# Patient Record
Sex: MALE | Race: WHITE | NOT HISPANIC OR LATINO | Employment: OTHER | URBAN - METROPOLITAN AREA
[De-identification: names, ages, dates, MRNs, and addresses within clinical notes are randomized per-mention and may not be internally consistent; named-entity substitution may affect disease eponyms.]

---

## 2017-01-02 PROBLEM — B96.20 E COLI BACTEREMIA: Status: ACTIVE | Noted: 2017-01-02

## 2017-01-02 PROBLEM — A41.52 PSEUDOMONAS SEPSIS: Status: ACTIVE | Noted: 2017-01-02

## 2017-01-02 PROBLEM — R78.81 BACTEREMIA DUE TO GRAM-NEGATIVE BACTERIA: Status: ACTIVE | Noted: 2017-01-02

## 2017-01-03 PROBLEM — R94.6 ABNORMAL THYROID FUNCTION TEST: Status: ACTIVE | Noted: 2017-01-03

## 2017-01-09 ENCOUNTER — ANESTHESIA EVENT (OUTPATIENT)
Dept: SURGERY | Facility: HOSPITAL | Age: 67
DRG: 003 | End: 2017-01-09
Payer: MEDICARE

## 2017-01-10 ENCOUNTER — ANESTHESIA (OUTPATIENT)
Dept: SURGERY | Facility: HOSPITAL | Age: 67
DRG: 003 | End: 2017-01-10
Payer: MEDICARE

## 2017-01-10 PROCEDURE — 63600175 PHARM REV CODE 636 W HCPCS: Performed by: NURSE ANESTHETIST, CERTIFIED REGISTERED

## 2017-01-10 PROCEDURE — D9220A PRA ANESTHESIA: Mod: CRNA,,, | Performed by: NURSE ANESTHETIST, CERTIFIED REGISTERED

## 2017-01-10 PROCEDURE — 25000003 PHARM REV CODE 250: Performed by: NURSE ANESTHETIST, CERTIFIED REGISTERED

## 2017-01-10 PROCEDURE — D9220A PRA ANESTHESIA: Mod: ANES,,, | Performed by: ANESTHESIOLOGY

## 2017-01-10 RX ORDER — FENTANYL CITRATE 50 UG/ML
INJECTION, SOLUTION INTRAMUSCULAR; INTRAVENOUS
Status: DISCONTINUED | OUTPATIENT
Start: 2017-01-10 | End: 2017-01-10

## 2017-01-10 RX ORDER — PHENYLEPHRINE HYDROCHLORIDE 10 MG/ML
INJECTION INTRAVENOUS
Status: DISCONTINUED | OUTPATIENT
Start: 2017-01-10 | End: 2017-01-10

## 2017-01-10 RX ORDER — MIDAZOLAM HYDROCHLORIDE 1 MG/ML
INJECTION, SOLUTION INTRAMUSCULAR; INTRAVENOUS
Status: DISCONTINUED | OUTPATIENT
Start: 2017-01-10 | End: 2017-01-10

## 2017-01-10 RX ADMIN — PHENYLEPHRINE HYDROCHLORIDE 200 MCG: 10 INJECTION INTRAVENOUS at 12:01

## 2017-01-10 RX ADMIN — MIDAZOLAM HYDROCHLORIDE 2 MG: 1 INJECTION, SOLUTION INTRAMUSCULAR; INTRAVENOUS at 11:01

## 2017-01-10 RX ADMIN — SODIUM CHLORIDE, SODIUM ACETATE ANHYDROUS, SODIUM GLUCONATE, POTASSIUM CHLORIDE, AND MAGNESIUM CHLORIDE: 526; 222; 502; 37; 30 INJECTION, SOLUTION INTRAVENOUS at 11:01

## 2017-01-10 RX ADMIN — FENTANYL CITRATE 50 MCG: 50 INJECTION, SOLUTION INTRAMUSCULAR; INTRAVENOUS at 11:01

## 2017-01-10 RX ADMIN — FENTANYL CITRATE 50 MCG: 50 INJECTION, SOLUTION INTRAMUSCULAR; INTRAVENOUS at 12:01

## 2017-01-10 NOTE — ANESTHESIA POSTPROCEDURE EVALUATION
"Anesthesia Post Evaluation    Patient: Enrique Yancey    Procedure(s) Performed: Procedure(s) (LRB):  TRACHEOTOMY- open (N/A)    Final Anesthesia Type: general  Patient location during evaluation: ICU  Patient participation: No - Unable to Participate, Coma/Other Inability to Communicate  Level of consciousness: obtunded/minimal responses  Post-procedure vital signs: reviewed and stable  Pain management: adequate  Airway patency: patent  PONV status at discharge: No PONV  Anesthetic complications: no      Cardiovascular status: blood pressure returned to baseline and hemodynamically stable  Hydration status: euvolemic  Follow-up not needed.        Visit Vitals    BP (!) 173/95 (BP Location: Left arm, Patient Position: Lying, BP Method: Automatic)    Pulse 79    Temp 37.3 °C (99.2 °F) (Axillary)    Resp (!) 23    Ht 5' 6" (1.676 m)    Wt 73.7 kg (162 lb 7.7 oz)    SpO2 100%    BMI 26.22 kg/m2       Pain/Dorian Score: Pain Assessment Performed: Yes (1/10/2017  3:00 PM)  Presence of Pain: non-verbal indicators absent (1/10/2017  3:00 PM)      "

## 2017-01-10 NOTE — ANESTHESIA PREPROCEDURE EVALUATION
01/09/2017  Enrique Yancey is a 66 y.o., male.with PMH of HTN, CKD III, CAD s/p MI x2, and multiple CVAs who presents with right sided weakness and aphasia (11/24/16) given TPA with improvement of speech and weakness ultimately with peg tube placement with subsequent GI bleed multiple failed extubation attempts and is currently intubated, off sedation, not following commands who presents for:    Pre-operative evaluation for Procedure(s) (LRB):  TRACHEOTOMY- open (N/A)    Previous anes:  EGD(12/29/16):  Present Prior to Hospital Arrival?: No; Placement Date: 12/29/16; Placement Time: 0920; Method of Intubation: Direct laryngoscopy; Inserted by: MD; Staff/Resident Names: Dr Chase; Airway Device: Endotracheal Tube; Airway Device Size: 8.0; Style: Cuffed; Cuff Inflation: Minimal occlusive pressure; Placement Verified By: Auscultation, Colorimetric EtCO2 device;  Depth of Insertion: 24; Securment: Lips; Complications: None; Breath Sounds: Equal Bilateral; Insertion Attempts: 1; Tolerance: Well    Patient Active Problem List   Diagnosis    Essential hypertension    Hx drug induced Dermatomyositis    MCI (mild cognitive impairment) with memory loss    History of TIA (transient ischemic attack)    Impaired glucose tolerance    CKD (chronic kidney disease) stage 3, GFR 30-59 ml/min    Cerebrovascular disease    Cytotoxic cerebral edema    Stenosis of left internal carotid artery with cerebral infarction    Hypovolemia due to dehydration    Aphasia    Right spastic hemiparesis    Hyponatremia with decreased serum osmolality    Seizure disorder, focal motor    Symptomatic carotid artery stenosis    Dysphagia    ATN (acute tubular necrosis)    Hypernatremia    Cerebrovascular accident (CVA)    Pharyngeal dysphagia    Pharyngoesophageal dysphagia    Diarrhea    Pancytopenia    GI bleed    Acute  respiratory failure with hypoxia and hypercapnia    Cerebrovascular accident (CVA) due to thrombosis of left carotid artery    Gastrointestinal hemorrhage associated with gastric ulcer    E coli bacteremia    Pseudomonas sepsis    Abnormal thyroid function test       Review of patient's allergies indicates:   Allergen Reactions    Diltiazem hcl     Metoprolol tartrate     Sulfa (sulfonamide antibiotics)        No current facility-administered medications on file prior to encounter.      Current Outpatient Prescriptions on File Prior to Encounter   Medication Sig Dispense Refill    amlodipine (NORVASC) 10 MG tablet TAKE 1 TABLET BY MOUTH EVERY DAY 90 tablet 3    aspirin 81 MG Chew Take 81 mg by mouth once daily.        b complex vitamins tablet Take 1 tablet by mouth once daily.      CYANOCOBALAMIN, VITAMIN B-12, (VITAMIN B-12 ORAL) Take 2,500 mcg by mouth once daily.      donepezil (ARICEPT) 5 MG tablet Take 1 tablet (5 mg total) by mouth every morning. 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      Retired     Social History Main Topics    Smoking status: Former Smoker     Types: Cigars    Smokeless tobacco: Never Used      Comment: occasional cigar    Alcohol use No    Drug use: No    Sexual activity: Not on file     Other Topics Concern    Not on file     Social History Narrative         Vital Signs Range (Last 24H):  Temp:  [36.7 °C (98 °F)-37.4 °C (99.4 °F)]   Pulse:  [77-89]   Resp:  [12-25]   BP: (153-172)/(84-99)   SpO2:  [98 %-100 %]       CBC:   Recent Labs      01/08/17   0300  01/09/17   0300   WBC  24.18*  23.80*   RBC  2.95*   3.10*   HGB  8.7*  9.1*   HCT  26.3*  27.8*   PLT  324  353*   MCV  89  90   MCH  29.5  29.4   MCHC  33.1  32.7       CMP:   Recent Labs      01/08/17   0300  01/08/17   0921   01/09/17   0300  01/09/17   1651   NA  143   --    < >  142  141   K  3.8   --    < >  3.9  3.4*   CL  112*   --    < >  111*  110   CO2  24   --    < >  22*  23   BUN  30*   --    < >  27*  27*   CREATININE  1.6*   --    < >  1.5*  1.4   GLU  94   --    < >  91  93   MG  1.3*  1.8   --   2.2   --    PHOS  3.0   --    --   3.4   --    CALCIUM  7.9*   --    < >  8.0*  7.9*   ALBUMIN  1.8*   --    < >  1.9*  1.8*   PROT  6.3   --    < >  6.9  6.8   ALKPHOS  188*   --    < >  175*  170*   ALT  29   --    < >  28  25   AST  43*   --    < >  41*  42*   BILITOT  1.1*   --    < >  1.0  0.9    < > = values in this interval not displayed.       INR  Recent Labs      01/09/17   1655   INR  1.5*   APTT  29.1           Diagnostic Studies:  Ab xr (1/9/17):  Abdomen single view compared to January 4.  G-tube noted.  Paucity of bowel gas in the abdomen.  No significant air in the descending colon or rectum.    Impression there is a paucity of bowel gas in the abdomen despite the history of abdominal distention.      Electronically signed by: JENNIFER DOVE MD  Date: 01/09/17  Time: 11:42     EKG:  Vent. Rate : 093 BPM     Atrial Rate : 093 BPM     P-R Int : 232 ms          QRS Dur : 102 ms      QT Int : 392 ms       P-R-T Axes : 047 -10 013 degrees     QTc Int : 487 ms    Sinus rhythm with 1st degree A-V block  Otherwise normal ECG    When compared with ECG of 24-NOV-2016 17:10,  No significant change was found  Confirmed by AUBREY BUSTILLO MD ([id number]) on 12/13/2016 10:47:31 AM    2D Echo:  Technical Quality: This is a portable study performed at the patient's bedside. This is a technically challenging study.     Aorta: The aortic root is normal in size, measuring 3.5 cm at sinotubular junction and 3.3 cm at Sinuses of Valsalva. The proximal ascending aorta is  normal in size, measuring 3.8 cm across.     Left Atrium: The left atrial volume index is normal, measuring 33.80 cc/m2.     Left Ventricle: The left ventricle is normal in size, with an end-diastolic diameter of 4.9 cm, and an end-systolic diameter of 3.4 cm. Septal wall thickness is upper limit of normal in size, with the septum measuring 1.1 cm and the posterior wall   measuring 1.0 cm across. Relative wall thickness was normal at 0.41, and the LV mass index was increased at 116.8 g/m2 consistent with eccentric left ventricular hypertrophy. Global left ventricular systolic function appears normal. Visually estimated   ejection fraction is 60-65%. The LV Doppler derived stroke volume equals 58.0 ccs.   The E/e'(lat) is 11, consistent with normal diastolic function.     Right Atrium: The right atrium is normal in size, measuring 5.0 cm in length and 4.0 cm in width in the apical view.     Right Ventricle: The right ventricle is normal in size measuring 3.0 cm at the base in the apical right ventricle-focused view. Global right ventricular systolic function appears normal. Tricuspid annular plane systolic excursion (TAPSE) is 2.2 cm. The   estimated PA systolic pressure is greater than 24 mmHg.     Aortic Valve:  The aortic valve is mildly sclerotic. Additionally, there is trivial to mild aortic regurgitation.     Mitral Valve:  There is mitral annular calcification. Mitral valve is normal in structure with normal leaflet mobility.     Tricuspid Valve:  There is mild tricuspid regurgitation. Tricuspid valve is normal in structure with normal leaflet mobility.     Pulmonary Valve:  Pulmonary valve is normal in structure with normal leaflet mobility.     IVC: Patient is on ventilator. IVC dynamics are not reliable in assessing right atrial pressure.     Intracavitary: There is no evidence of pericardial effusion, intracavity mass, thrombi, or vegetation.         CONCLUSIONS     1 - Eccentric hypertrophy.     2 -  Normal left ventricular systolic function (EF 60-65%).     3 - Normal left ventricular diastolic function.     4 - Normal right ventricular systolic function .     5 - The estimated PA systolic pressure is greater than 24 mmHg.     6 - Trivial to mild aortic regurgitation.     7 - Mild tricuspid regurgitation.             This document has been electronically    SIGNED BY: Ashley Mendoza MD On: 01/03/2017 15:21      [facility] Anesthesia Evaluation    I have reviewed the Patient Summary Reports.     I have reviewed the Medications.     Review of Systems  Anesthesia Hx:  No problems with previous Anesthesia  Denies Family Hx of Anesthesia complications.   Denies Personal Hx of Anesthesia complications.   Social:  Former Smoker    Cardiovascular:   Hypertension, well controlled Past MI  Denies CABG/stent.     Pulmonary:  Pulmonary Normal Currently vent-dependent    Renal/:   Chronic Renal Disease, CRI    Hepatic/GI:   PUD,    Neurological:   CVA, residual symptoms    Endocrine:  Endocrine Normal        Physical Exam  General:  Well nourished    Airway/Jaw/Neck:  Airway Findings: Pre-Existing Airway Tube(s): Oral Endotracheal tube Size: 8.0       Chest/Lungs:  Chest/Lungs Findings: (coarse ventilatory breath sounds throughout)    Heart/Vascular:  Heart Findings: Rate: Normal  Rhythm: Regular Rhythm        Mental Status:  Mental Status Findings:  Somnolent         Anesthesia Plan  Type of Anesthesia, risks & benefits discussed:  Anesthesia Type:  general  Patient's Preference:   Intra-op Monitoring Plan:   Intra-op Monitoring Plan Comments:   Post Op Pain Control Plan:   Post Op Pain Control Plan Comments:   Induction:   IV  Beta Blocker:  Patient is on a Beta-Blocker and has received one dose within the past 24 hours (No further documentation required).       Informed Consent: Patient representative understands risks and agrees with Anesthesia plan.  Questions answered. Anesthesia consent signed with patient  representative.  ASA Score: 4     Day of Surgery Review of History & Physical:    H&P update referred to the surgeon.         Ready For Surgery From Anesthesia Perspective.

## 2017-01-10 NOTE — TRANSFER OF CARE
"Anesthesia Transfer of Care Note    Patient: Enrique Yancey    Procedure(s) Performed: Procedure(s) (LRB):  TRACHEOTOMY- open (N/A)    Patient location: ICU    Anesthesia Type: general    Transport from OR: Transported from OR intubated on 100% O2 by AMBU with assisted ventilation. Continuous ECG monitoring in transport. Continuous SpO2 monitoring in transport    Post pain: adequate analgesia    Post assessment: no apparent anesthetic complications    Post vital signs: stable    Level of consciousness: sedated    Nausea/Vomiting: no nausea/vomiting    Comments: 550/4/.40/+5/10 IMV  133/77  HR 79  Sat 99% and ax temp 98 on arrival to ICU.      Last vitals:   Visit Vitals    BP (!) 157/84 (BP Location: Left arm, Patient Position: Lying, BP Method: Automatic)    Pulse 79    Temp 37.7 °C (99.8 °F) (Axillary)    Resp 20    Ht 5' 6" (1.676 m)    Wt 73.7 kg (162 lb 7.7 oz)    SpO2 99%    BMI 26.22 kg/m2     "

## 2017-01-14 ENCOUNTER — TELEPHONE (OUTPATIENT)
Dept: GASTROENTEROLOGY | Facility: CLINIC | Age: 67
End: 2017-01-14

## 2017-01-15 NOTE — TELEPHONE ENCOUNTER
PT w gastric ulcers.  Needs repeat EGD in 8-12 weeks.  Currently admitted to hospital w CVA.  PLs schedule follow up apt w mid level provider in 6 weeks so that pt can be scheduled for procedure if clinically appropriate at that time.

## 2017-01-20 ENCOUNTER — TELEPHONE (OUTPATIENT)
Dept: GASTROENTEROLOGY | Facility: CLINIC | Age: 67
End: 2017-01-20

## 2017-01-20 PROBLEM — R78.81 E COLI BACTEREMIA: Status: RESOLVED | Noted: 2017-01-02 | Resolved: 2017-01-20

## 2017-01-20 PROBLEM — B96.20 E COLI BACTEREMIA: Status: RESOLVED | Noted: 2017-01-02 | Resolved: 2017-01-20

## 2017-01-20 PROBLEM — A41.52 PSEUDOMONAS SEPSIS: Status: RESOLVED | Noted: 2017-01-02 | Resolved: 2017-01-20

## 2017-05-13 ENCOUNTER — LAB VISIT (OUTPATIENT)
Dept: LAB | Facility: HOSPITAL | Age: 67
End: 2017-05-13
Attending: FAMILY MEDICINE
Payer: MEDICARE

## 2017-05-13 DIAGNOSIS — J98.8 CONGESTION OF RESPIRATORY TRACT: ICD-10-CM

## 2017-05-13 DIAGNOSIS — R50.9 ELEVATED TEMPERATURE: Primary | ICD-10-CM

## 2017-05-13 LAB
BASOPHILS # BLD AUTO: 0 K/UL
BASOPHILS NFR BLD: 0.2 %
DIFFERENTIAL METHOD: ABNORMAL
EOSINOPHIL # BLD AUTO: 0.3 K/UL
EOSINOPHIL NFR BLD: 2.6 %
ERYTHROCYTE [DISTWIDTH] IN BLOOD BY AUTOMATED COUNT: 19.6 %
HCT VFR BLD AUTO: 25.9 %
HGB BLD-MCNC: 8.3 G/DL
LYMPHOCYTES # BLD AUTO: 0.6 K/UL
LYMPHOCYTES NFR BLD: 6 %
MCH RBC QN AUTO: 26 PG
MCHC RBC AUTO-ENTMCNC: 32 %
MCV RBC AUTO: 81 FL
MONOCYTES # BLD AUTO: 0.9 K/UL
MONOCYTES NFR BLD: 8.8 %
NEUTROPHILS # BLD AUTO: 8.2 K/UL
NEUTROPHILS NFR BLD: 82.4 %
PLATELET # BLD AUTO: 302 K/UL
PMV BLD AUTO: 9.3 FL
RBC # BLD AUTO: 3.19 M/UL
WBC # BLD AUTO: 10 K/UL

## 2017-05-13 PROCEDURE — 85025 COMPLETE CBC W/AUTO DIFF WBC: CPT

## 2017-05-13 PROCEDURE — 36415 COLL VENOUS BLD VENIPUNCTURE: CPT

## 2017-12-16 ENCOUNTER — HOSPITAL ENCOUNTER (EMERGENCY)
Facility: HOSPITAL | Age: 67
Discharge: ANOTHER HOSPITAL | End: 2017-12-17
Attending: EMERGENCY MEDICINE
Payer: MEDICARE

## 2017-12-16 DIAGNOSIS — G40.901 STATUS EPILEPTICUS: Primary | ICD-10-CM

## 2017-12-16 DIAGNOSIS — J18.9 HCAP (HEALTHCARE-ASSOCIATED PNEUMONIA): ICD-10-CM

## 2017-12-16 LAB
ALBUMIN SERPL BCP-MCNC: 3.1 G/DL
ALP SERPL-CCNC: 198 U/L
ALT SERPL W/O P-5'-P-CCNC: 164 U/L
AMORPH CRY URNS QL MICRO: ABNORMAL
ANION GAP SERPL CALC-SCNC: 7 MMOL/L
AST SERPL-CCNC: 59 U/L
BACTERIA #/AREA URNS HPF: ABNORMAL /HPF
BASOPHILS # BLD AUTO: 0 K/UL
BASOPHILS NFR BLD: 0.1 %
BILIRUB SERPL-MCNC: 0.7 MG/DL
BILIRUB UR QL STRIP: NEGATIVE
BUN SERPL-MCNC: 43 MG/DL
CALCIUM SERPL-MCNC: 9.6 MG/DL
CHLORIDE SERPL-SCNC: 107 MMOL/L
CLARITY UR: CLEAR
CO2 SERPL-SCNC: 21 MMOL/L
COLOR UR: YELLOW
CREAT SERPL-MCNC: 1.2 MG/DL
DIFFERENTIAL METHOD: ABNORMAL
EOSINOPHIL # BLD AUTO: 0 K/UL
EOSINOPHIL NFR BLD: 0 %
ERYTHROCYTE [DISTWIDTH] IN BLOOD BY AUTOMATED COUNT: 16.7 %
EST. GFR  (AFRICAN AMERICAN): >60 ML/MIN/1.73 M^2
EST. GFR  (NON AFRICAN AMERICAN): >60 ML/MIN/1.73 M^2
GLUCOSE SERPL-MCNC: 152 MG/DL
GLUCOSE UR QL STRIP: NEGATIVE
HCT VFR BLD AUTO: 35.6 %
HGB BLD-MCNC: 11.8 G/DL
HGB UR QL STRIP: ABNORMAL
HYALINE CASTS #/AREA URNS LPF: 0 /LPF
KETONES UR QL STRIP: NEGATIVE
LEUKOCYTE ESTERASE UR QL STRIP: ABNORMAL
LYMPHOCYTES # BLD AUTO: 0.4 K/UL
LYMPHOCYTES NFR BLD: 2.2 %
MCH RBC QN AUTO: 31.1 PG
MCHC RBC AUTO-ENTMCNC: 33.3 G/DL
MCV RBC AUTO: 94 FL
MICROSCOPIC COMMENT: ABNORMAL
MONOCYTES # BLD AUTO: 0.5 K/UL
MONOCYTES NFR BLD: 2.6 %
NEUTROPHILS # BLD AUTO: 18.3 K/UL
NEUTROPHILS NFR BLD: 95.1 %
NITRITE UR QL STRIP: NEGATIVE
PH UR STRIP: 6 [PH] (ref 5–8)
PLATELET # BLD AUTO: 171 K/UL
PMV BLD AUTO: 9.9 FL
POTASSIUM SERPL-SCNC: 4.7 MMOL/L
PROT SERPL-MCNC: 8.9 G/DL
PROT UR QL STRIP: ABNORMAL
RBC # BLD AUTO: 3.8 M/UL
RBC #/AREA URNS HPF: 5 /HPF (ref 0–4)
SODIUM SERPL-SCNC: 135 MMOL/L
SP GR UR STRIP: 1.02 (ref 1–1.03)
SQUAMOUS #/AREA URNS HPF: 5 /HPF
URN SPEC COLLECT METH UR: ABNORMAL
UROBILINOGEN UR STRIP-ACNC: NEGATIVE EU/DL
WBC # BLD AUTO: 19.2 K/UL
WBC #/AREA URNS HPF: 20 /HPF (ref 0–5)
WBC CLUMPS URNS QL MICRO: ABNORMAL

## 2017-12-16 PROCEDURE — 87086 URINE CULTURE/COLONY COUNT: CPT

## 2017-12-16 PROCEDURE — 87088 URINE BACTERIA CULTURE: CPT

## 2017-12-16 PROCEDURE — 96366 THER/PROPH/DIAG IV INF ADDON: CPT

## 2017-12-16 PROCEDURE — 99291 CRITICAL CARE FIRST HOUR: CPT | Mod: 25

## 2017-12-16 PROCEDURE — 36415 COLL VENOUS BLD VENIPUNCTURE: CPT

## 2017-12-16 PROCEDURE — 80053 COMPREHEN METABOLIC PANEL: CPT

## 2017-12-16 PROCEDURE — 87186 SC STD MICRODIL/AGAR DIL: CPT

## 2017-12-16 PROCEDURE — 63600175 PHARM REV CODE 636 W HCPCS: Performed by: EMERGENCY MEDICINE

## 2017-12-16 PROCEDURE — 25000003 PHARM REV CODE 250: Performed by: EMERGENCY MEDICINE

## 2017-12-16 PROCEDURE — 87040 BLOOD CULTURE FOR BACTERIA: CPT

## 2017-12-16 PROCEDURE — 81000 URINALYSIS NONAUTO W/SCOPE: CPT

## 2017-12-16 PROCEDURE — 96375 TX/PRO/DX INJ NEW DRUG ADDON: CPT

## 2017-12-16 PROCEDURE — 85025 COMPLETE CBC W/AUTO DIFF WBC: CPT

## 2017-12-16 PROCEDURE — 96367 TX/PROPH/DG ADDL SEQ IV INF: CPT

## 2017-12-16 PROCEDURE — 96365 THER/PROPH/DIAG IV INF INIT: CPT

## 2017-12-16 PROCEDURE — 87077 CULTURE AEROBIC IDENTIFY: CPT

## 2017-12-16 PROCEDURE — 93005 ELECTROCARDIOGRAM TRACING: CPT

## 2017-12-16 RX ORDER — CIPROFLOXACIN 2 MG/ML
400 INJECTION, SOLUTION INTRAVENOUS
Status: COMPLETED | OUTPATIENT
Start: 2017-12-16 | End: 2017-12-17

## 2017-12-16 RX ORDER — LORAZEPAM 2 MG/ML
INJECTION INTRAMUSCULAR
Status: DISCONTINUED
Start: 2017-12-16 | End: 2017-12-17 | Stop reason: HOSPADM

## 2017-12-16 RX ORDER — LORAZEPAM 2 MG/ML
2 INJECTION INTRAMUSCULAR
Status: COMPLETED | OUTPATIENT
Start: 2017-12-16 | End: 2017-12-16

## 2017-12-16 RX ORDER — LEVETIRACETAM 10 MG/ML
1000 INJECTION INTRAVASCULAR
Status: COMPLETED | OUTPATIENT
Start: 2017-12-16 | End: 2017-12-16

## 2017-12-16 RX ORDER — LORAZEPAM 2 MG/ML
2 INJECTION INTRAMUSCULAR
Status: DISCONTINUED | OUTPATIENT
Start: 2017-12-16 | End: 2017-12-16

## 2017-12-16 RX ORDER — ACETAMINOPHEN 650 MG/1
650 SUPPOSITORY RECTAL
Status: COMPLETED | OUTPATIENT
Start: 2017-12-16 | End: 2017-12-16

## 2017-12-16 RX ADMIN — LORAZEPAM 2 MG: 2 INJECTION, SOLUTION INTRAMUSCULAR; INTRAVENOUS at 09:12

## 2017-12-16 RX ADMIN — LEVETIRACETAM 1000 MG: 1000 INJECTION, SOLUTION INTRAVENOUS at 10:12

## 2017-12-16 RX ADMIN — ACETAMINOPHEN 650 MG: 650 SUPPOSITORY RECTAL at 11:12

## 2017-12-16 RX ADMIN — LORAZEPAM 2 MG: 2 INJECTION, SOLUTION INTRAMUSCULAR; INTRAVENOUS at 10:12

## 2017-12-16 RX ADMIN — SODIUM CHLORIDE 1000 ML: 0.9 INJECTION, SOLUTION INTRAVENOUS at 11:12

## 2017-12-16 RX ADMIN — CIPROFLOXACIN 400 MG: 2 INJECTION, SOLUTION INTRAVENOUS at 11:12

## 2017-12-17 ENCOUNTER — HOSPITAL ENCOUNTER (INPATIENT)
Facility: HOSPITAL | Age: 67
LOS: 38 days | Discharge: NURSING, REHAB OR LONG-TERM CARE | DRG: 100 | End: 2018-01-24
Attending: PSYCHIATRY & NEUROLOGY | Admitting: PSYCHIATRY & NEUROLOGY
Payer: MEDICARE

## 2017-12-17 VITALS
DIASTOLIC BLOOD PRESSURE: 61 MMHG | HEART RATE: 80 BPM | WEIGHT: 120 LBS | SYSTOLIC BLOOD PRESSURE: 118 MMHG | HEIGHT: 65 IN | OXYGEN SATURATION: 100 % | RESPIRATION RATE: 20 BRPM | BODY MASS INDEX: 19.99 KG/M2 | TEMPERATURE: 101 F

## 2017-12-17 DIAGNOSIS — G40.901 STATUS EPILEPTICUS: ICD-10-CM

## 2017-12-17 DIAGNOSIS — G92.9 TOXIC ENCEPHALOPATHY: ICD-10-CM

## 2017-12-17 DIAGNOSIS — R56.9 SEIZURE: ICD-10-CM

## 2017-12-17 DIAGNOSIS — R25.2 SPASTICITY: ICD-10-CM

## 2017-12-17 DIAGNOSIS — R13.14 PHARYNGOESOPHAGEAL DYSPHAGIA: ICD-10-CM

## 2017-12-17 DIAGNOSIS — E03.9 ACQUIRED HYPOTHYROIDISM: ICD-10-CM

## 2017-12-17 DIAGNOSIS — N30.00 ACUTE CYSTITIS WITHOUT HEMATURIA: ICD-10-CM

## 2017-12-17 DIAGNOSIS — I10 HYPERTENSION, UNSPECIFIED TYPE: ICD-10-CM

## 2017-12-17 DIAGNOSIS — E03.9 HYPOTHYROIDISM, UNSPECIFIED TYPE: ICD-10-CM

## 2017-12-17 DIAGNOSIS — I10 ESSENTIAL HYPERTENSION: ICD-10-CM

## 2017-12-17 DIAGNOSIS — G81.11 RIGHT SPASTIC HEMIPARESIS: ICD-10-CM

## 2017-12-17 DIAGNOSIS — J18.9 PNEUMONIA OF BOTH LUNGS DUE TO INFECTIOUS ORGANISM, UNSPECIFIED PART OF LUNG: ICD-10-CM

## 2017-12-17 DIAGNOSIS — D64.9 NORMOCYTIC ANEMIA: ICD-10-CM

## 2017-12-17 DIAGNOSIS — I10 HTN (HYPERTENSION): ICD-10-CM

## 2017-12-17 DIAGNOSIS — R33.9 URINARY RETENTION: ICD-10-CM

## 2017-12-17 DIAGNOSIS — R47.01 APHASIA: ICD-10-CM

## 2017-12-17 DIAGNOSIS — R13.12 OROPHARYNGEAL DYSPHAGIA: ICD-10-CM

## 2017-12-17 DIAGNOSIS — N18.30 CKD (CHRONIC KIDNEY DISEASE) STAGE 3, GFR 30-59 ML/MIN: ICD-10-CM

## 2017-12-17 PROBLEM — R41.82 ALTERED MENTAL STATUS: Status: ACTIVE | Noted: 2017-12-17

## 2017-12-17 LAB
ALBUMIN SERPL BCP-MCNC: 2.7 G/DL
ALP SERPL-CCNC: 172 U/L
ALT SERPL W/O P-5'-P-CCNC: 133 U/L
ANION GAP SERPL CALC-SCNC: 9 MMOL/L
ANISOCYTOSIS BLD QL SMEAR: SLIGHT
APTT BLDCRRT: 21.7 SEC
AST SERPL-CCNC: 53 U/L
BACTERIA #/AREA URNS AUTO: NORMAL /HPF
BASOPHILS # BLD AUTO: 0 K/UL
BASOPHILS NFR BLD: 0 %
BILIRUB SERPL-MCNC: 0.7 MG/DL
BILIRUB UR QL STRIP: NEGATIVE
BUN SERPL-MCNC: 37 MG/DL
CALCIUM SERPL-MCNC: 8.6 MG/DL
CHLORIDE SERPL-SCNC: 109 MMOL/L
CLARITY UR REFRACT.AUTO: CLEAR
CO2 SERPL-SCNC: 17 MMOL/L
COLOR UR AUTO: ABNORMAL
CREAT SERPL-MCNC: 1 MG/DL
DIFFERENTIAL METHOD: ABNORMAL
EOSINOPHIL # BLD AUTO: 0 K/UL
EOSINOPHIL NFR BLD: 0 %
ERYTHROCYTE [DISTWIDTH] IN BLOOD BY AUTOMATED COUNT: 16.2 %
EST. GFR  (AFRICAN AMERICAN): >60 ML/MIN/1.73 M^2
EST. GFR  (NON AFRICAN AMERICAN): >60 ML/MIN/1.73 M^2
GLUCOSE SERPL-MCNC: 110 MG/DL
GLUCOSE UR QL STRIP: NEGATIVE
HCT VFR BLD AUTO: 34.1 %
HGB BLD-MCNC: 11.1 G/DL
HGB UR QL STRIP: ABNORMAL
HYPOCHROMIA BLD QL SMEAR: ABNORMAL
IMM GRANULOCYTES # BLD AUTO: 0.03 K/UL
IMM GRANULOCYTES NFR BLD AUTO: 0.3 %
INR PPP: 1
INR PPP: 1.1
KETONES UR QL STRIP: NEGATIVE
LACTATE SERPL-SCNC: 1.6 MMOL/L
LEUKOCYTE ESTERASE UR QL STRIP: ABNORMAL
LYMPHOCYTES # BLD AUTO: 0.2 K/UL
LYMPHOCYTES NFR BLD: 2.3 %
MAGNESIUM SERPL-MCNC: 1.7 MG/DL
MCH RBC QN AUTO: 31.4 PG
MCHC RBC AUTO-ENTMCNC: 32.6 G/DL
MCV RBC AUTO: 97 FL
MICROSCOPIC COMMENT: NORMAL
MONOCYTES # BLD AUTO: 0.2 K/UL
MONOCYTES NFR BLD: 2 %
NEUTROPHILS # BLD AUTO: 9.3 K/UL
NEUTROPHILS NFR BLD: 95.4 %
NITRITE UR QL STRIP: NEGATIVE
NRBC BLD-RTO: 0 /100 WBC
OVALOCYTES BLD QL SMEAR: ABNORMAL
PH UR STRIP: 6 [PH] (ref 5–8)
PHOSPHATE SERPL-MCNC: 4.1 MG/DL
PLATELET # BLD AUTO: 133 K/UL
PMV BLD AUTO: 11.9 FL
POCT GLUCOSE: 105 MG/DL (ref 70–110)
POCT GLUCOSE: 84 MG/DL (ref 70–110)
POCT GLUCOSE: 99 MG/DL (ref 70–110)
POIKILOCYTOSIS BLD QL SMEAR: SLIGHT
POLYCHROMASIA BLD QL SMEAR: ABNORMAL
POTASSIUM SERPL-SCNC: 4.4 MMOL/L
PROCALCITONIN SERPL IA-MCNC: 6.92 NG/ML
PROT SERPL-MCNC: 7.9 G/DL
PROT UR QL STRIP: NEGATIVE
PROTHROMBIN TIME: 10.9 SEC
PROTHROMBIN TIME: 11.6 SEC
RBC # BLD AUTO: 3.53 M/UL
RBC #/AREA URNS AUTO: 3 /HPF (ref 0–4)
SODIUM SERPL-SCNC: 135 MMOL/L
SP GR UR STRIP: 1.01 (ref 1–1.03)
URN SPEC COLLECT METH UR: ABNORMAL
UROBILINOGEN UR STRIP-ACNC: NEGATIVE EU/DL
WBC # BLD AUTO: 9.7 K/UL
WBC #/AREA URNS AUTO: 3 /HPF (ref 0–5)

## 2017-12-17 PROCEDURE — 80053 COMPREHEN METABOLIC PANEL: CPT

## 2017-12-17 PROCEDURE — 63600175 PHARM REV CODE 636 W HCPCS: Performed by: PHYSICIAN ASSISTANT

## 2017-12-17 PROCEDURE — 63600175 PHARM REV CODE 636 W HCPCS: Performed by: EMERGENCY MEDICINE

## 2017-12-17 PROCEDURE — 85730 THROMBOPLASTIN TIME PARTIAL: CPT

## 2017-12-17 PROCEDURE — 83605 ASSAY OF LACTIC ACID: CPT

## 2017-12-17 PROCEDURE — 25000003 PHARM REV CODE 250: Performed by: NURSE PRACTITIONER

## 2017-12-17 PROCEDURE — 96367 TX/PROPH/DG ADDL SEQ IV INF: CPT

## 2017-12-17 PROCEDURE — 97802 MEDICAL NUTRITION INDIV IN: CPT

## 2017-12-17 PROCEDURE — 83735 ASSAY OF MAGNESIUM: CPT

## 2017-12-17 PROCEDURE — 25000003 PHARM REV CODE 250: Performed by: PHYSICIAN ASSISTANT

## 2017-12-17 PROCEDURE — 86850 RBC ANTIBODY SCREEN: CPT

## 2017-12-17 PROCEDURE — 99223 1ST HOSP IP/OBS HIGH 75: CPT | Mod: ,,, | Performed by: NURSE PRACTITIONER

## 2017-12-17 PROCEDURE — 85025 COMPLETE CBC W/AUTO DIFF WBC: CPT

## 2017-12-17 PROCEDURE — 27000221 HC OXYGEN, UP TO 24 HOURS

## 2017-12-17 PROCEDURE — 63600175 PHARM REV CODE 636 W HCPCS: Performed by: PSYCHIATRY & NEUROLOGY

## 2017-12-17 PROCEDURE — 36600 WITHDRAWAL OF ARTERIAL BLOOD: CPT

## 2017-12-17 PROCEDURE — 85610 PROTHROMBIN TIME: CPT

## 2017-12-17 PROCEDURE — 85610 PROTHROMBIN TIME: CPT | Mod: 91

## 2017-12-17 PROCEDURE — 20000000 HC ICU ROOM

## 2017-12-17 PROCEDURE — 84145 PROCALCITONIN (PCT): CPT

## 2017-12-17 PROCEDURE — 95951 PR EEG MONITORING/VIDEORECORD: CPT | Mod: 26,,, | Performed by: PSYCHIATRY & NEUROLOGY

## 2017-12-17 PROCEDURE — 81001 URINALYSIS AUTO W/SCOPE: CPT

## 2017-12-17 PROCEDURE — 96375 TX/PRO/DX INJ NEW DRUG ADDON: CPT

## 2017-12-17 PROCEDURE — 27100171 HC OXYGEN HIGH FLOW UP TO 24 HOURS

## 2017-12-17 PROCEDURE — 36415 COLL VENOUS BLD VENIPUNCTURE: CPT

## 2017-12-17 PROCEDURE — 25000003 PHARM REV CODE 250: Performed by: PSYCHIATRY & NEUROLOGY

## 2017-12-17 PROCEDURE — 86900 BLOOD TYPING SEROLOGIC ABO: CPT

## 2017-12-17 PROCEDURE — 99900022

## 2017-12-17 PROCEDURE — 99900026 HC AIRWAY MAINTENANCE (STAT)

## 2017-12-17 PROCEDURE — 95951 HC EEG MONITORING/VIDEO RECORD: CPT

## 2017-12-17 PROCEDURE — 25000003 PHARM REV CODE 250: Performed by: EMERGENCY MEDICINE

## 2017-12-17 PROCEDURE — 86901 BLOOD TYPING SEROLOGIC RH(D): CPT

## 2017-12-17 PROCEDURE — 82803 BLOOD GASES ANY COMBINATION: CPT

## 2017-12-17 PROCEDURE — 94761 N-INVAS EAR/PLS OXIMETRY MLT: CPT

## 2017-12-17 PROCEDURE — 93010 ELECTROCARDIOGRAM REPORT: CPT | Mod: ,,, | Performed by: INTERNAL MEDICINE

## 2017-12-17 PROCEDURE — 84100 ASSAY OF PHOSPHORUS: CPT

## 2017-12-17 RX ORDER — AMOXICILLIN 250 MG
1 CAPSULE ORAL DAILY
Status: DISCONTINUED | OUTPATIENT
Start: 2017-12-18 | End: 2018-01-24 | Stop reason: HOSPADM

## 2017-12-17 RX ORDER — ASCORBIC ACID 500 MG
500 TABLET ORAL 2 TIMES DAILY
Status: DISCONTINUED | OUTPATIENT
Start: 2017-12-17 | End: 2017-12-18

## 2017-12-17 RX ORDER — LEVOTHYROXINE SODIUM 75 UG/1
75 TABLET ORAL DAILY
COMMUNITY

## 2017-12-17 RX ORDER — AMLODIPINE BESYLATE 10 MG/1
10 TABLET ORAL DAILY
Status: ON HOLD | COMMUNITY
End: 2017-12-27 | Stop reason: HOSPADM

## 2017-12-17 RX ORDER — FLUOXETINE HYDROCHLORIDE 20 MG/1
20 CAPSULE ORAL DAILY
COMMUNITY

## 2017-12-17 RX ORDER — ATORVASTATIN CALCIUM 40 MG/1
40 TABLET, FILM COATED ORAL NIGHTLY
Status: ON HOLD | COMMUNITY
End: 2017-12-31

## 2017-12-17 RX ORDER — ATORVASTATIN CALCIUM 20 MG/1
40 TABLET, FILM COATED ORAL DAILY
Status: DISCONTINUED | OUTPATIENT
Start: 2017-12-17 | End: 2018-01-24 | Stop reason: HOSPADM

## 2017-12-17 RX ORDER — POLYETHYLENE GLYCOL 3350 17 G/17G
17 POWDER, FOR SOLUTION ORAL DAILY
Status: DISCONTINUED | OUTPATIENT
Start: 2017-12-18 | End: 2018-01-24 | Stop reason: HOSPADM

## 2017-12-17 RX ORDER — POLYETHYLENE GLYCOL 3350 17 G/17G
17 POWDER, FOR SOLUTION ORAL DAILY
Status: ON HOLD | COMMUNITY
End: 2018-01-24 | Stop reason: HOSPADM

## 2017-12-17 RX ORDER — LEVETIRACETAM 5 MG/ML
500 INJECTION INTRAVASCULAR EVERY 12 HOURS
Status: DISCONTINUED | OUTPATIENT
Start: 2017-12-17 | End: 2017-12-18

## 2017-12-17 RX ORDER — TAMSULOSIN HYDROCHLORIDE 0.4 MG/1
0.4 CAPSULE ORAL 2 TIMES DAILY
Status: ON HOLD | COMMUNITY
End: 2018-01-24 | Stop reason: HOSPADM

## 2017-12-17 RX ORDER — LOPERAMIDE HYDROCHLORIDE 2 MG/1
2 CAPSULE ORAL
Status: ON HOLD | COMMUNITY
End: 2018-01-24

## 2017-12-17 RX ORDER — DOCUSATE SODIUM 50 MG/5ML
100 LIQUID ORAL 2 TIMES DAILY
Status: DISCONTINUED | OUTPATIENT
Start: 2017-12-17 | End: 2017-12-17

## 2017-12-17 RX ORDER — BACLOFEN 10 MG/1
20 TABLET ORAL 3 TIMES DAILY
Status: DISCONTINUED | OUTPATIENT
Start: 2017-12-17 | End: 2017-12-22

## 2017-12-17 RX ORDER — ASPIRIN 81 MG/1
81 TABLET ORAL DAILY
Status: DISCONTINUED | OUTPATIENT
Start: 2017-12-17 | End: 2017-12-19

## 2017-12-17 RX ORDER — B-COMPLEX WITH VITAMIN C
1 TABLET ORAL DAILY
Status: DISCONTINUED | OUTPATIENT
Start: 2017-12-18 | End: 2018-01-24 | Stop reason: HOSPADM

## 2017-12-17 RX ORDER — FLUOXETINE HYDROCHLORIDE 20 MG/1
20 CAPSULE ORAL DAILY
Status: DISCONTINUED | OUTPATIENT
Start: 2017-12-18 | End: 2018-01-16

## 2017-12-17 RX ORDER — ENOXAPARIN SODIUM 100 MG/ML
40 INJECTION SUBCUTANEOUS
Status: DISCONTINUED | OUTPATIENT
Start: 2017-12-17 | End: 2018-01-24 | Stop reason: HOSPADM

## 2017-12-17 RX ORDER — TAMSULOSIN HYDROCHLORIDE 0.4 MG/1
0.4 CAPSULE ORAL 2 TIMES DAILY
Status: DISCONTINUED | OUTPATIENT
Start: 2017-12-17 | End: 2018-01-24 | Stop reason: HOSPADM

## 2017-12-17 RX ORDER — POLYETHYLENE GLYCOL 3350 17 G/17G
17 POWDER, FOR SOLUTION ORAL DAILY
Status: DISCONTINUED | OUTPATIENT
Start: 2017-12-17 | End: 2017-12-17

## 2017-12-17 RX ORDER — ACETAMINOPHEN 160 MG/5ML
20.3 ELIXIR ORAL EVERY 4 HOURS PRN
COMMUNITY

## 2017-12-17 RX ORDER — LEVOTHYROXINE SODIUM 75 UG/1
75 TABLET ORAL DAILY
Status: DISCONTINUED | OUTPATIENT
Start: 2017-12-18 | End: 2018-01-24 | Stop reason: HOSPADM

## 2017-12-17 RX ORDER — CHLORHEXIDINE GLUCONATE ORAL RINSE 1.2 MG/ML
15 SOLUTION DENTAL 2 TIMES DAILY
Status: DISCONTINUED | OUTPATIENT
Start: 2017-12-17 | End: 2017-12-25

## 2017-12-17 RX ORDER — FAMOTIDINE 20 MG/1
20 TABLET, FILM COATED ORAL 2 TIMES DAILY
Status: DISCONTINUED | OUTPATIENT
Start: 2017-12-17 | End: 2017-12-17

## 2017-12-17 RX ORDER — BETHANECHOL CHLORIDE 10 MG/1
10 TABLET ORAL EVERY 8 HOURS
Status: DISCONTINUED | OUTPATIENT
Start: 2017-12-17 | End: 2018-01-24 | Stop reason: HOSPADM

## 2017-12-17 RX ORDER — DOCUSATE SODIUM 100 MG/1
100 CAPSULE, LIQUID FILLED ORAL 2 TIMES DAILY
Status: DISCONTINUED | OUTPATIENT
Start: 2017-12-17 | End: 2017-12-17

## 2017-12-17 RX ORDER — ONDANSETRON 2 MG/ML
4 INJECTION INTRAMUSCULAR; INTRAVENOUS EVERY 8 HOURS PRN
Status: DISCONTINUED | OUTPATIENT
Start: 2017-12-17 | End: 2018-01-24 | Stop reason: HOSPADM

## 2017-12-17 RX ORDER — MUPIROCIN 20 MG/G
OINTMENT TOPICAL 2 TIMES DAILY
Status: DISCONTINUED | OUTPATIENT
Start: 2017-12-17 | End: 2018-01-24 | Stop reason: HOSPADM

## 2017-12-17 RX ORDER — LABETALOL 300 MG/1
300 TABLET, FILM COATED ORAL 2 TIMES DAILY
Status: ON HOLD | COMMUNITY
End: 2018-01-24 | Stop reason: HOSPADM

## 2017-12-17 RX ORDER — MUPIROCIN 20 MG/G
OINTMENT TOPICAL 2 TIMES DAILY
COMMUNITY

## 2017-12-17 RX ORDER — FINASTERIDE 5 MG/1
5 TABLET, FILM COATED ORAL DAILY
COMMUNITY

## 2017-12-17 RX ORDER — SODIUM CHLORIDE 0.9 % (FLUSH) 0.9 %
3 SYRINGE (ML) INJECTION
Status: DISCONTINUED | OUTPATIENT
Start: 2017-12-17 | End: 2018-01-24 | Stop reason: HOSPADM

## 2017-12-17 RX ORDER — AMOXICILLIN 250 MG
1 CAPSULE ORAL DAILY
Status: DISCONTINUED | OUTPATIENT
Start: 2017-12-17 | End: 2017-12-17

## 2017-12-17 RX ORDER — B-COMPLEX WITH VITAMIN C
1 TABLET ORAL DAILY
COMMUNITY

## 2017-12-17 RX ORDER — FINASTERIDE 5 MG/1
5 TABLET, FILM COATED ORAL DAILY
Status: DISCONTINUED | OUTPATIENT
Start: 2017-12-18 | End: 2018-01-24 | Stop reason: HOSPADM

## 2017-12-17 RX ORDER — POTASSIUM CHLORIDE 20 MEQ/1
20 TABLET, EXTENDED RELEASE ORAL 2 TIMES DAILY
Status: DISCONTINUED | OUTPATIENT
Start: 2017-12-17 | End: 2017-12-18

## 2017-12-17 RX ORDER — BETHANECHOL CHLORIDE 25 MG/1
25 TABLET ORAL EVERY 8 HOURS
COMMUNITY

## 2017-12-17 RX ORDER — POLYETHYLENE GLYCOL 3350 17 G/17G
17 POWDER, FOR SOLUTION ORAL DAILY
Status: DISCONTINUED | OUTPATIENT
Start: 2017-12-18 | End: 2017-12-18

## 2017-12-17 RX ORDER — DONEPEZIL HYDROCHLORIDE 5 MG/1
5 TABLET, FILM COATED ORAL DAILY
COMMUNITY

## 2017-12-17 RX ORDER — BACLOFEN 20 MG/1
20 TABLET ORAL 3 TIMES DAILY
Status: ON HOLD | COMMUNITY
End: 2017-12-27

## 2017-12-17 RX ORDER — AMOXICILLIN 250 MG
1 CAPSULE ORAL DAILY PRN
Status: DISCONTINUED | OUTPATIENT
Start: 2017-12-17 | End: 2017-12-17

## 2017-12-17 RX ORDER — OMEPRAZOLE 40 MG/1
CAPSULE, DELAYED RELEASE ORAL
COMMUNITY

## 2017-12-17 RX ORDER — FAMOTIDINE 20 MG/1
20 TABLET, FILM COATED ORAL 2 TIMES DAILY
Status: DISCONTINUED | OUTPATIENT
Start: 2017-12-17 | End: 2018-01-11

## 2017-12-17 RX ORDER — POTASSIUM CHLORIDE 20 MEQ/1
TABLET, EXTENDED RELEASE ORAL
COMMUNITY

## 2017-12-17 RX ORDER — DONEPEZIL HYDROCHLORIDE 5 MG/1
5 TABLET, FILM COATED ORAL DAILY
Status: DISCONTINUED | OUTPATIENT
Start: 2017-12-18 | End: 2018-01-24 | Stop reason: HOSPADM

## 2017-12-17 RX ORDER — CHLORHEXIDINE GLUCONATE ORAL RINSE 1.2 MG/ML
15 SOLUTION DENTAL 2 TIMES DAILY
COMMUNITY

## 2017-12-17 RX ORDER — SODIUM CHLORIDE 9 MG/ML
INJECTION, SOLUTION INTRAVENOUS CONTINUOUS
Status: DISCONTINUED | OUTPATIENT
Start: 2017-12-17 | End: 2017-12-20

## 2017-12-17 RX ORDER — ASCORBIC ACID 500 MG/5ML
500 SYRUP ORAL 2 TIMES DAILY
COMMUNITY

## 2017-12-17 RX ADMIN — VANCOMYCIN HYDROCHLORIDE 1000 MG: 1 INJECTION, POWDER, LYOPHILIZED, FOR SOLUTION INTRAVENOUS at 01:12

## 2017-12-17 RX ADMIN — DOCUSATE SODIUM 100 MG: 100 CAPSULE, LIQUID FILLED ORAL at 08:12

## 2017-12-17 RX ADMIN — DOCUSATE SODIUM AND SENNOSIDES 1 TABLET: 8.6; 5 TABLET, FILM COATED ORAL at 12:12

## 2017-12-17 RX ADMIN — ATORVASTATIN CALCIUM 40 MG: 20 TABLET, FILM COATED ORAL at 11:12

## 2017-12-17 RX ADMIN — BACLOFEN 20 MG: 10 TABLET ORAL at 09:12

## 2017-12-17 RX ADMIN — ASPIRIN 81 MG: 81 TABLET, COATED ORAL at 11:12

## 2017-12-17 RX ADMIN — PIPERACILLIN SODIUM AND TAZOBACTAM SODIUM 4.5 G: 4; .5 INJECTION, POWDER, LYOPHILIZED, FOR SOLUTION INTRAVENOUS at 04:12

## 2017-12-17 RX ADMIN — FAMOTIDINE 20 MG: 20 TABLET, FILM COATED ORAL at 08:12

## 2017-12-17 RX ADMIN — OXYCODONE HYDROCHLORIDE AND ACETAMINOPHEN 500 MG: 500 TABLET ORAL at 09:12

## 2017-12-17 RX ADMIN — PIPERACILLIN SODIUM AND TAZOBACTAM SODIUM 3.38 G: 3; .375 INJECTION, POWDER, FOR SOLUTION INTRAVENOUS at 01:12

## 2017-12-17 RX ADMIN — LEVETIRACETAM 500 MG: 5 INJECTION INTRAVENOUS at 08:12

## 2017-12-17 RX ADMIN — CHLORHEXIDINE GLUCONATE 15 ML: 1.2 RINSE ORAL at 09:12

## 2017-12-17 RX ADMIN — PIPERACILLIN SODIUM AND TAZOBACTAM SODIUM 4.5 G: 4; .5 INJECTION, POWDER, LYOPHILIZED, FOR SOLUTION INTRAVENOUS at 09:12

## 2017-12-17 RX ADMIN — POTASSIUM CHLORIDE 20 MEQ: 1500 TABLET, EXTENDED RELEASE ORAL at 09:12

## 2017-12-17 RX ADMIN — LEVETIRACETAM 500 MG: 5 INJECTION INTRAVENOUS at 09:12

## 2017-12-17 RX ADMIN — SODIUM CHLORIDE 1000 ML: 0.9 INJECTION, SOLUTION INTRAVENOUS at 12:12

## 2017-12-17 RX ADMIN — ENOXAPARIN SODIUM 40 MG: 100 INJECTION SUBCUTANEOUS at 04:12

## 2017-12-17 RX ADMIN — FAMOTIDINE 20 MG: 20 TABLET, FILM COATED ORAL at 09:12

## 2017-12-17 RX ADMIN — TAMSULOSIN HYDROCHLORIDE 0.4 MG: 0.4 CAPSULE ORAL at 09:12

## 2017-12-17 RX ADMIN — POLYETHYLENE GLYCOL 3350 17 G: 17 POWDER, FOR SOLUTION ORAL at 08:12

## 2017-12-17 RX ADMIN — MUPIROCIN: 20 OINTMENT TOPICAL at 09:12

## 2017-12-17 RX ADMIN — SODIUM CHLORIDE: 0.9 INJECTION, SOLUTION INTRAVENOUS at 05:12

## 2017-12-17 NOTE — CONSULTS
"  [facility]  Adult Nutrition  Consult Note    SUMMARY     Recommendations    1. TF recommendations: Isosource 1.5 @ 40 mL/hr to provide 1440 calories, 65 g of protein, 733 mL fluid.   - Hold for residuals >400 mL; additional fluid per MD.  2. RD to monitor & follow-up.    Goals: Meet % EEN, EPN  Nutrition Goal Status: new  Communication of RD Recs: reviewed with RN    Reason for Assessment    Reason for Assessment: nurse/nurse practitioner consult  Diagnosis: other (see comments) (Seizure)  Relevent Medical History: HTN, PEG placement (2016)   Interdisciplinary Rounds: did not attend     General Information Comments: Pt presents from NH, non-verbal, unsure of usual TF regimen. On trach collar, PEG present. History obtained from chart review.  Nutrition Discharge Planning: Likely back to NH on TFs    Nutrition Prescription Ordered    Current Diet Order: NPO    Evaluation of Received Nutrients/Fluid Intake    IV Fluid (mL): 1200    Nutrition/Diet History    Patient Reported Diet/Restrictions/Preferences: other (see comments) (SHELLY)     Factors Affecting Nutritional Intake: NPO    Labs/Tests/Procedures/Meds    Pertinent Labs Reviewed: reviewed, pertinent  Pertinent Labs Comments: Na 135, BUN 37, AST 53,   Pertinent Medications Reviewed: reviewed, pertinent  Pertinent Medications Comments: IVF    Physical Findings    Overall Physical Appearance: underweight, other (see comments) (On trach collar)  Tubes: gastrostomy tube  Oral/Mouth Cavity: WDL  Skin: pressure ulcer(s)    Anthropometrics    Temp: 98.9 °F (37.2 °C)     Height: 5' 5" (165.1 cm) (Obtained from chart review)     Weight: 54.4 kg (119 lb 14.9 oz)    Ideal Body Weight (IBW), Male: 136 lb  % Ideal Body Weight, Male (lb): 88.18 lb     BMI (Calculated): 20  BMI Grade: 18.5-24.9 - normal     Usual Body Weight (UBW), k.6 kg     % Usual Body Weight: 70.25  % Weight Change From Usual Weight: -29.9 %    Estimated/Assessed " Needs    Weight Used For Calorie Calculations: 54.4 kg (119 lb 14.9 oz)      Energy Calorie Requirements (kcal): 1563 kcal/d  Energy Need Method: [place]-St Jeor (1.25 PAL)     Weight Used For Protein Calculations: 54.4 kg (119 lb 14.9 oz)  Protein Requirements: 55-65 g/d (1-1.2  g/kg)     Fluid Need Method: RDA Method, other (see comments) (Per MD or 1 mL/kcal)    Assessment and Plan    Nutrition Problem  Inadequate energy intake    Related to (etiology):   Inability to consume sufficient energy    Signs and Symptoms (as evidenced by):   NPO with no alternate means of nutrition.    Nutrition Diagnosis Status:   New    Monitor and Evaluation    Food and Nutrient Intake: enteral nutrition intake  Food and Nutrient Adminstration: enteral and parenteral nutrition administration     Physical Activity and Function: nutrition-related ADLs and IADLs  Anthropometric Measurements: weight, weight change  Biochemical Data, Medical Tests and Procedures: lipid profile, inflammatory profile, glucose/endocrine profile, gastrointestinal profile, electrolyte and renal panel  Nutrition-Focused Physical Findings: overall appearance    Nutrition Risk    Level of Risk: other (see comments) (2x/week)    Nutrition Follow-Up    RD Follow-up?: Yes

## 2017-12-17 NOTE — HPI
Mr dickerson is a  is a 67 y.o. 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    who presents to [facility] for evaluation of new onset Seizure activity. He presented to outside ED with seizure activity for approximately 20 minutes. Per EMS, he was diaphoretic on arrival and tachycardic. Per nursing home, pt is nonverbal at baseline, but can focus on the speaker. He has a trach in place. He is being admitted to [facility] for a higher level of care.

## 2017-12-17 NOTE — PROGRESS NOTES
Notified NCC team, Spoke to [place] via phone. Patient's temperature via axillary is 100.1 F. Ordered to place cooling blanket. Will continue to monitor.

## 2017-12-17 NOTE — PROCEDURES
ICU EEG/VIDEO MONITORING REPORT    Enrique Yancey  [id number]  1950    DATE OF SERVICE: 12/17/17    DATE OF ADMISSION: 12/17/2017  5:16 AM    ADMITTING PROVIDER: Andrea Amin MD    REASON FOR CONSULT: 68yo M with multiple medical co-morbidities, including prior CVA, admitted with seizures.    MEDICATIONS:   Current Facility-Administered Medications   Medication    0.9%  NaCl infusion    docusate sodium capsule 100 mg    famotidine tablet 20 mg    levETIRAcetam in NaCl (iso-os) IVPB 500 mg    ondansetron injection 4 mg    polyethylene glycol packet 17 g    sodium chloride 0.9% flush 3 mL     METHODOLOGY-cap   Electroencephalographic (EEG) recording is with electrodes placed according to the International 10-20 placement system.  Thirty two (32) channels of digital signal are simultaneously recorded from the scalp and may include EKG, EMG, and/or eye monitors.   Recording band pass was 0.1 to 512 hz.  Digital video recording of the patient is simultaneously recorded with the EEG.  The nursing staff report clinical symptoms and may press an event button when the patient has symptoms of clinical interest to the treating physicians.  EEG and video recording is stored and archived in digital format.  The entire recording is visually reviewed and the times identified by computer analysis as being spikes or seizures are reviewed again.  Activation procedures which include photic stimulation, hyperventilation and instructing patients to perform simple task are done in selected patients.   Compresses spectral analysis (CSA) is also performed on the activity recorded from each individual channel.  This is displayed as a power display of frequencies from 0 to 30 Hz over time.   The CSA analysis is done and displayed continuously.  This is reviewed for asymmetries in power between homologous areas of the scalp and for presence of changes in power which canbe seen when seizures occur.  Sections of suspected  abnormalities on the CSA is then compared with the original EEG recording.     mgMEDIA software was also utilized in the review of this study.  This software suite analyzes the EEG recording in multiple domains.  Coherence and rhythmicity is computed to identify EEG sections which may contain organized seizures.  Each channel undergoes analysis to detect presence of spike and sharp waves which have special and morphological characteristic of epileptic activity.  The routine EEG recording is converted from spacial into frequency domain.  This is then displayed comparing homologous areas to identify areas of significant asymmetry.  Algorithm to identify non-cortically generated artifact is used to separate eye movement, EMG and other artifact from the EEG.      Recording Times  Start on 12/17/17, 04:44  Stop on 12/17/17, 10:47    A total of 6 hours 3 minutes of EEG was recorded.    EEG FINDINGS  Background activity:   The background rhythm was characterized by low amplitude (<10uV) delta-theta (3-5 Hz) activity   No posterior dominant rhythm seen.   Symmetry and continuity:  the background was continuous and symmetric    Sleep:   Not seen.    Activation procedures:   Not seen    Abnormal activity:   No epileptiform discharges, periodic discharges, lateralized rhythmic delta activity or electrographic seizures were seen.    IMPRESSION:   This is an abnormal cap-EEG due to the moderate to severe generalized slowing seen, suggestive of moderate to severe diffuse or multifocal cerebral dysfunction.    No epileptiform activity or electrographic seizures seen.    CLINICAL CORRELATION IS RECOMMENDED.    Anya Coker MD, ADAN(), FACNS.  Neurology-Epilepsy.

## 2017-12-17 NOTE — NURSING
0310- Pt arrived to room 7093 with EMS from [facility] NCC team notified. VS stable, connected to monitor, SCD's connected, foot boots on,  condom catheter placed, EKG done, bed locked and low, call bell in reach. Will continue to monitor.    0430- EEG cap put on. Epileptology notified. Will continue to monitor.

## 2017-12-17 NOTE — PLAN OF CARE
Problem: Patient Care Overview  Goal: Plan of Care Review  Outcome: Ongoing (interventions implemented as appropriate)  Recommendations     1. TF recommendations: Isosource 1.5 @ 40 mL/hr to provide 1440 calories, 65 g of protein, 733 mL fluid.              - Hold for residuals >400 mL; additional fluid per MD.  2. RD to monitor & follow-up.

## 2017-12-17 NOTE — ED PROVIDER NOTES
Encounter Date: 12/16/2017    SCRIBE #1 NOTE: I, Yaa Franco, am scribing for, and in the presence of, Dr. Jules.       History     Chief Complaint   Patient presents with    Seizures     Status       12/16/2017  9:53 PM    Chief Complaint: Seizure      The patient is a 67 y.o. male who presents with seizure activity for approximately 20 minutes. Per EMS, he was diaphoretic on arrival and tachycardic. Per nursing home, pt is nonverbal at baseline, but can focus on the speaker. He has a trach in place. 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  No pertinent surgical history.        The history is provided by the EMS personnel and the nursing home. The history is limited by the condition of the patient and the absence of a caregiver (Nonverbal).     Review of patient's allergies indicates:   Allergen Reactions    Diltiazem hcl     Metoprolol tartrate     Sulfa (sulfonamide antibiotics)      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     Stroke      Past Surgical History:   Procedure Laterality Date    KNEE SURGERY        in high school     Family History   Problem Relation Age of Onset    Rheum arthritis Mother     Colon cancer Father     Cancer Father 60     colon ca     Social History   Substance Use Topics    Smoking status: Former Smoker     Types: Cigars    Smokeless tobacco: Never Used      Comment: occasional cigar    Alcohol use No     Review of Systems   Unable to perform ROS: Patient nonverbal   Constitutional: Positive for diaphoresis.   Neurological: Positive for seizures.       Physical Exam     Vitals:    12/16/17 2303   BP:    Pulse:    Resp: 20   Temp:          Physical Exam    Nursing note and vitals reviewed.  Constitutional: He is diaphoretic.   HENT:   Head: Normocephalic and  atraumatic.   Mouth/Throat: Oropharynx is clear and moist.   Eyes: Conjunctivae are normal.   Pupils reactive 2-3 bilaterally. Right-sided gaze deficit with facial twitch.   Neck: Neck supple.   Cardiovascular: Regular rhythm, normal heart sounds and intact distal pulses. Tachycardia present.  Exam reveals no gallop and no friction rub.    No murmur heard.  Pulmonary/Chest: He has no wheezes. He has rhonchi. He has no rales.   Rhonchi with bagging. Trach in place.   Abdominal: Soft. He exhibits no distension. There is no tenderness.   Peg tube in place.   Musculoskeletal:   Contractures BUE and BLE.    Neurological:   Unresponsive to external stimuli. No localization to pain or response. No vocalizations.    Skin: No rash noted. No erythema.   Psychiatric:   Unable to assess secondary to nonverbal pt.         ED Course   Critical Care  Date/Time: 12/17/2017 12:21 AM  Performed by: ARNEL JORDAN  Authorized by: ARNEL JORDAN   Direct patient critical care time: 17 minutes  Additional history critical care time: 8 minutes  Ordering / reviewing critical care time: 9 minutes  Documentation critical care time: 5 minutes  Consulting other physicians critical care time: 5 minutes  Consult with family critical care time: 5 minutes  Total critical care time (exclusive of procedural time) : 49 minutes        Labs Reviewed   CBC W/ AUTO DIFFERENTIAL - Abnormal; Notable for the following:        Result Value    WBC 19.20 (*)     RBC 3.80 (*)     Hemoglobin 11.8 (*)     Hematocrit 35.6 (*)     MCH 31.1 (*)     RDW 16.7 (*)     Gran # 18.3 (*)     Lymph # 0.4 (*)     Gran% 95.1 (*)     Lymph% 2.2 (*)     Mono% 2.6 (*)     All other components within normal limits   COMPREHENSIVE METABOLIC PANEL - Abnormal; Notable for the following:     Sodium 135 (*)     CO2 21 (*)     Glucose 152 (*)     BUN, Bld 43 (*)     Total Protein 8.9 (*)     Albumin 3.1 (*)     Alkaline Phosphatase 198 (*)     AST 59 (*)       (*)     Anion Gap 7 (*)     All other components within normal limits   URINALYSIS - Abnormal; Notable for the following:     Protein, UA 2+ (*)     Occult Blood UA 1+ (*)     Leukocytes, UA 2+ (*)     All other components within normal limits   URINALYSIS MICROSCOPIC - Abnormal; Notable for the following:     RBC, UA 5 (*)     WBC, UA 20 (*)     Bacteria, UA Many (*)     All other components within normal limits   CULTURE, BLOOD   CULTURE, BLOOD   CULTURE, URINE        Imaging Results          CT Head Without Contrast (In process)                X-Ray Chest 1 View (In process)                    Medical Decision Making:   History:   Old Medical Records: I decided to obtain old medical records.  Clinical Tests:   Lab Tests: Ordered and Reviewed  Radiological Study: Ordered and Reviewed  Medical Tests: Ordered and Reviewed            Scribe Attestation:   Scribe #1: I performed the above scribed service and the documentation accurately describes the services I performed. I attest to the accuracy of the note.    I, Dr. Amadou Jules, personally performed the services described in this documentation. All medical record entries made by the scribe were at my direction and in my presence.  I have reviewed the chart and agree that the record reflects my personal performance and is accurate and complete. Amadou Jules MD.  12:18 AM 12/17/2017    Enrique Yancey is a 67 y.o. male presenting with recurrent seizure marked by tonic right gaze deviation and facial twitching 4.  Greater than 20 minutes despite prehospital Versed terminated with IV lorazepam here.  He has persistent sedation despite lack of seizure activity evident here.  Baseline mental activities ability to focus on speaker per nursing staff and family.  Family was updated and plan of care need for transfer.  I did speak with neurology here felt he needed transfer for continuous EEG monitoring given recent status epilepticus.  He has been loaded with  Keppra IV.  Head CT shows no sign of acute intracranial process.  I have low suspicion for meningitis or encephalitis with fever more likely correlating with right lower lobe pneumonia healthcare associated in nature.  Review medical record shows prior pseudomonal bacteremia within the last year and I will double cover for pseudomonas with Zosyn and Cipro along with vancomycin initiated.  IV fluid resuscitation with IV fluid saline boluses also given here.  Blood cultures also drawn.  I doubt sepsis.  Lactic acid is likely to be less helpful and inevitably elevated given his prolonged seizure activity.  He has not had further seizure activity with monitoring and I have spoken with neuro critical care who has accepted the patient in transfer.  Labs reviewed with no other electrolyte derangement and leukocytosis possibly supporting infectious process as in pneumonia.  Urine culture also sent.        ED Course as of Dec 17 0017   Sat Dec 16, 2017   2214 EKG:  Sinus tachycardia, rate of 106, normal intervals and axis.  There are no acute ST or T wave changes suggestive of acute ischemia or infarction.  No arrhythmia.    [MR]   2254 CXR:  RLL infiltrate. (my read)  [MR]   2340 Discussed with Dr. Vanegas neurology who felt patient should be txf to [facility] for continuous EEG monitoring to r/o persistent seizure activity given recent status epilepticus.  Awaiting return neuro critical care via txf center.  Family aware of current plans and workup.  [MR]   Sun Dec 17, 2017   0003 CT-H:  No acute findings. Pronounced chronic intracranial changes as described above and previously. (radiology reading, visualized by me)  [MR]      ED Course User Index  [MR] Amadou Jules MD     Clinical Impression:     1. Status epilepticus    2. HCAP (healthcare-associated pneumonia)                       Amadou Jules MD  12/17/17 0022

## 2017-12-17 NOTE — PLAN OF CARE
Problem: Patient Care Overview  Goal: Plan of Care Review  Outcome: Ongoing (interventions implemented as appropriate)  POC reviewed with pt at 0310. Pt needs reinforcement, unable to verbalize understanding. Questions and concerns addressed. No acute events today. Pt admitted to the unit at 0310 (see note). EEG cap in place, condom catheter put on, EKG done, NS @ 50. Midline consult and wound consult done. Pt nonverbal.  Pt progressing toward goals. Will continue to monitor. See flowsheets for full assessment and VS info.

## 2017-12-17 NOTE — HOSPITAL COURSE
12/17: admit to [facility], EEG pending, Keppra 1 G  at OSH and 500 Q 12 started, CTH: No acute process   12/18:increase keppra per epilepsy, sputum culture, trend procal  12/19: start RISS, discussed with daughter at bedside, not happy with Noa.  12/20: Pending to wean off the vent. Pending placement. Last EEG : L-PLEDS. The patient is very contracted. S/p trach placement.01/2017.  12/21: Pending to wean off the vent. And placement. Last EEG : L-PLEDS.  Yesterday Vanc was D/C and we left him on Zosyn. He was placed on isolation for E.Coli ESBL. S/P trach / PEG. Rehab consult to assess for Botox  injections for spasticity. Tolerating trach collar.   12/22: No clinical changes. Patient on day 2 of trach collar. ABG OK. Started on Dantrolene and pending to wean Baclofen off..  12/23 - no acute events  12/24 Pt continues to be stable. No significant events today. Will continue to monitor closely  12/25: Pending Ltac/Sn[facility] transfer on 12/26. If unable to place, will step down to internal Medicine.  12/26: Pending placement   12/27: Transfer to LTAC   12/28: Nursing home will not accept patient due to trach collar, will continue to search for placement

## 2017-12-17 NOTE — SUBJECTIVE & OBJECTIVE
Interval History:  Admit NCC new onset SZ     Review of Systems  Unable to obtain a complete ROS due to level of consciousness.  Objective:     Vitals:       Temp:  [101.2 °F (38.4 °C)] 101.2 °F (38.4 °C)  Pulse:  [] 80  Resp:  [20] 20  SpO2:  [93 %-100 %] 100 %  BP: (108-168)/() 118/61         Oxygen Concentration (%):  [50] 50    12/16 0701 - 12/17 0700  In: 2250   Out: -     Physical Exam      Physical Exam:  GA: Alert, comfortable, no acute distress.   HEENT: No scleral icterus or JVD.   Pulmonary: Clear to auscultation A/P/L. No wheezing, crackles, or rhonchi.  Cardiac: RRR S1 & S2 w/o rubs/murmurs/gallops.   Abdominal: Bowel sounds present x 4. No appreciable hepatosplenomegaly.  Skin: No jaundice, rashes, or visible lesions.  Neuro:  --GCS: E2 V1 M4  --Mental Status:  Non verbal at baseline and no response to verbal, does not open eyes or track   --CN II-XII grossly intact.   --Pupils 3mm, PERRL.   --Corneal reflex, gag, cough intact.  --Slight withdrawal to painful stimuli to upper ext  severely contracted x 4 ext   Unable to test orientation, language, memory, judgment, insight, coordination, gait due to level of consciousness.    Medications:  Continuous  sodium chloride 0.9%   Scheduled  docusate sodium 100 mg BID   levETIRAcetam in NaCl (iso-os) 500 mg Q12H   polyethylene glycol 17 g Daily   PRN  ondansetron 4 mg Q8H PRN   sodium chloride 0.9% 3 mL PRN     Today I personally reviewed pertinent medications, lines/drains/airways, lab results,

## 2017-12-17 NOTE — PLAN OF CARE
Problem: Patient Care Overview  Goal: Plan of Care Review  Outcome: Ongoing (interventions implemented as appropriate)  Plan of care reviewed with patient at 1200 however patient is non verbal and drowsy. POC reviewed with patient's daughter via phone and verbalized understanding of the POC. Patient hooked to continuous EEG as of this writing. Started on TF with a goal of 40 ml/hr. Still on NS x 50. Due medications given. Still on trach collar. Oral and trach secretions suctioned as needed. Patient progressing towards goals of care. Please see flow sheet for detailed nursing assessment and VS. Will continue to monitor.

## 2017-12-17 NOTE — ED NOTES
Seizing stopped. Dr. Jules at bedside. Pt no longer being bagged. Currently 94% O2 saturation on 50% oxygen by venti t piece to trach.

## 2017-12-17 NOTE — ED NOTES
"Pt brought to ED via EMS from [facility]. Pt was began 20 minutes prior to EMS arrival at [place]. Pt was given 5 mg versed and is still seizing. Pt actively seizing and is being bagged by trach. Per EMS the pts initial oxygen saturation was "in the 70's."  Pt normally tracks with his eyes and follows commands. At this time, pt is unresponsive. Twitching noted to pts eyes and mouth. Pt placed on the cardiac monitor, BP cuff, and pulse ox. Sinus tach noted to cardiac monitor. Will continue to monitor.   "

## 2017-12-17 NOTE — ED NOTES
Daughter is at the bedside, she reports that she is leaving and requests to be called with plan of care.

## 2017-12-17 NOTE — ED NOTES
Patient resting in bed with eyes closed, chest rise is symmetrical, respirations are regular and unlabored. RHIANNON LAL

## 2017-12-17 NOTE — H&P
[facility]  Neurocritical Care  H&P     Admit Date: (Not on file)  Service Date: 12/17/2017  Length of Stay: 0    Subjective:     Chief Complaint: Seizure    History of Present Illness: Mr dickerson is a  is a 67 y.o. 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    who presents to [facility] for evaluation of new onset Seizure activity. He presented to outside ED with seizure activity for approximately 20 minutes. Per EMS, he was diaphoretic on arrival and tachycardic. Per nursing home, pt is nonverbal at baseline, but can focus on the speaker. He has a trach in place. He is being admitted to [facility] for a higher level of care.     Hospital Course: 12/17: admit to [facility], EEG pending, Keppra 1 G  at OSH and 500 Q 12 started, CTH: No acute process     Interval History:  Admit NCC new onset SZ     Review of Systems  Unable to obtain a complete ROS due to level of consciousness.  Objective:     Vitals:       Temp:  [101.2 °F (38.4 °C)] 101.2 °F (38.4 °C)  Pulse:  [] 80  Resp:  [20] 20  SpO2:  [93 %-100 %] 100 %  BP: (108-168)/() 118/61         Oxygen Concentration (%):  [50] 50    12/16 0701 - 12/17 0700  In: 2250   Out: -     Physical Exam      Physical Exam:  GA: Obtunded , comfortable, no acute distress.   HEENT: No scleral icterus or JVD.   Pulmonary: Clear to auscultation A/L. .  Cardiac: RRR S1 & S2 w/o rubs/murmurs/gallops.   Abdominal: Bowel sounds present x 4.   Skin: No jaundice, rashes, or visible lesions.  Neuro:  --GCS: E2 V1 M4  --Mental Status:  Non verbal at baseline and no response to verbal, does not open eyes or track   --Pupils 3mm, PERRL.   --Corneal reflex, gag, cough intact.  --Slight withdrawal to painful stimuli to upper ext  severely contracted x 4 ext   Unable to test orientation, language, memory, judgment, insight, coordination, gait due to level of consciousness.    Medications:  Continuous  sodium chloride  0.9%   Scheduled  docusate sodium 100 mg BID   levETIRAcetam in NaCl (iso-os) 500 mg Q12H   polyethylene glycol 17 g Daily   PRN  ondansetron 4 mg Q8H PRN   sodium chloride 0.9% 3 mL PRN     Today I personally reviewed pertinent medications, lines/drains/airways, lab results,     Assessment/Plan:     Neuro   * Seizure    - Consult epilepsy   - EEG cap pending   - CTH- No acute process  - Keppra 1 G PTA and 500 Q12  - SZ precautions        Altered mental status    - Tracks at baseline   - See seizure         Aphasia    - Hx  prior Stroke        Cardiac/Vascular   Essential hypertension    - SBP < 180   - Obtain home medication list   - PRN Labetalol         Renal/   CKD (chronic kidney disease) stage 3, GFR 30-59 ml/min    - Monitor labs   - CR 1.2 Bun 42        GI   Dysphagia    - NPO  -  meds per peg             Prophylaxis:  Venous Thromboembolism: mechanical  Stress Ulcer: H2B  Ventilator Pneumonia: not applicable     Activity Orders          Ambulate with assistance starting at 12/17 3807        Full Code    Daniel Crandall NP  Neurocritical Care  [facility]

## 2017-12-17 NOTE — ASSESSMENT & PLAN NOTE
- Consult epilepsy   - EEG cap pending   - CTH- No acute process  - Keppra 1 G PTA and 500 Q12  - SZ precautions

## 2017-12-18 LAB
ALBUMIN SERPL BCP-MCNC: 2.3 G/DL
ALLENS TEST: ABNORMAL
ALP SERPL-CCNC: 150 U/L
ALT SERPL W/O P-5'-P-CCNC: 107 U/L
ANION GAP SERPL CALC-SCNC: 6 MMOL/L
AST SERPL-CCNC: 48 U/L
BASOPHILS # BLD AUTO: 0.01 K/UL
BASOPHILS NFR BLD: 0.1 %
BILIRUB SERPL-MCNC: 0.5 MG/DL
BUN SERPL-MCNC: 40 MG/DL
CALCIUM SERPL-MCNC: 8.6 MG/DL
CHLORIDE SERPL-SCNC: 113 MMOL/L
CO2 SERPL-SCNC: 18 MMOL/L
CREAT SERPL-MCNC: 1.2 MG/DL
DELSYS: ABNORMAL
DIFFERENTIAL METHOD: ABNORMAL
EOSINOPHIL # BLD AUTO: 0.5 K/UL
EOSINOPHIL NFR BLD: 3.5 %
ERYTHROCYTE [DISTWIDTH] IN BLOOD BY AUTOMATED COUNT: 16.4 %
EST. GFR  (AFRICAN AMERICAN): >60 ML/MIN/1.73 M^2
EST. GFR  (NON AFRICAN AMERICAN): >60 ML/MIN/1.73 M^2
GLUCOSE SERPL-MCNC: 144 MG/DL
HCO3 UR-SCNC: 20 MMOL/L (ref 24–28)
HCT VFR BLD AUTO: 28.6 %
HGB BLD-MCNC: 9 G/DL
IMM GRANULOCYTES # BLD AUTO: 0.06 K/UL
IMM GRANULOCYTES NFR BLD AUTO: 0.5 %
LYMPHOCYTES # BLD AUTO: 0.2 K/UL
LYMPHOCYTES NFR BLD: 1.6 %
MAGNESIUM SERPL-MCNC: 1.7 MG/DL
MCH RBC QN AUTO: 30.7 PG
MCHC RBC AUTO-ENTMCNC: 31.5 G/DL
MCV RBC AUTO: 98 FL
MONOCYTES # BLD AUTO: 0.6 K/UL
MONOCYTES NFR BLD: 4.3 %
NEUTROPHILS # BLD AUTO: 12 K/UL
NEUTROPHILS NFR BLD: 90 %
NRBC BLD-RTO: 0 /100 WBC
PCO2 BLDA: 40.7 MMHG (ref 35–45)
PH SMN: 7.3 [PH] (ref 7.35–7.45)
PHOSPHATE SERPL-MCNC: 1.7 MG/DL
PLATELET # BLD AUTO: 140 K/UL
PMV BLD AUTO: 12 FL
PO2 BLDA: 73 MMHG (ref 80–100)
POC BE: -6 MMOL/L
POC SATURATED O2: 93 % (ref 95–100)
POC TCO2: 21 MMOL/L (ref 23–27)
POCT GLUCOSE: 134 MG/DL (ref 70–110)
POCT GLUCOSE: 154 MG/DL (ref 70–110)
POTASSIUM SERPL-SCNC: 3.6 MMOL/L
PROCALCITONIN SERPL IA-MCNC: 8.04 NG/ML
PROT SERPL-MCNC: 6.7 G/DL
RBC # BLD AUTO: 2.93 M/UL
SAMPLE: ABNORMAL
SITE: ABNORMAL
SODIUM SERPL-SCNC: 137 MMOL/L
WBC # BLD AUTO: 13.28 K/UL

## 2017-12-18 PROCEDURE — 87077 CULTURE AEROBIC IDENTIFY: CPT | Mod: 59

## 2017-12-18 PROCEDURE — 87186 SC STD MICRODIL/AGAR DIL: CPT

## 2017-12-18 PROCEDURE — 87070 CULTURE OTHR SPECIMN AEROBIC: CPT

## 2017-12-18 PROCEDURE — 63600175 PHARM REV CODE 636 W HCPCS: Performed by: PHYSICIAN ASSISTANT

## 2017-12-18 PROCEDURE — 25000003 PHARM REV CODE 250: Performed by: PHYSICIAN ASSISTANT

## 2017-12-18 PROCEDURE — 95951 PR EEG MONITORING/VIDEORECORD: CPT | Mod: 26,,, | Performed by: PSYCHIATRY & NEUROLOGY

## 2017-12-18 PROCEDURE — 87205 SMEAR GRAM STAIN: CPT

## 2017-12-18 PROCEDURE — 94761 N-INVAS EAR/PLS OXIMETRY MLT: CPT

## 2017-12-18 PROCEDURE — 25000003 PHARM REV CODE 250: Performed by: NURSE PRACTITIONER

## 2017-12-18 PROCEDURE — 84145 PROCALCITONIN (PCT): CPT

## 2017-12-18 PROCEDURE — 63600175 PHARM REV CODE 636 W HCPCS: Performed by: PSYCHIATRY & NEUROLOGY

## 2017-12-18 PROCEDURE — 25000003 PHARM REV CODE 250: Performed by: PSYCHIATRY & NEUROLOGY

## 2017-12-18 PROCEDURE — 27000221 HC OXYGEN, UP TO 24 HOURS

## 2017-12-18 PROCEDURE — 95951 HC EEG MONITORING/VIDEO RECORD: CPT

## 2017-12-18 PROCEDURE — 80053 COMPREHEN METABOLIC PANEL: CPT

## 2017-12-18 PROCEDURE — 84100 ASSAY OF PHOSPHORUS: CPT

## 2017-12-18 PROCEDURE — 99291 CRITICAL CARE FIRST HOUR: CPT | Mod: ,,, | Performed by: PSYCHIATRY & NEUROLOGY

## 2017-12-18 PROCEDURE — 83735 ASSAY OF MAGNESIUM: CPT

## 2017-12-18 PROCEDURE — 20000000 HC ICU ROOM

## 2017-12-18 PROCEDURE — 85025 COMPLETE CBC W/AUTO DIFF WBC: CPT

## 2017-12-18 RX ORDER — SODIUM,POTASSIUM PHOSPHATES 280-250MG
2 POWDER IN PACKET (EA) ORAL
Status: DISCONTINUED | OUTPATIENT
Start: 2017-12-18 | End: 2017-12-29

## 2017-12-18 RX ORDER — LANOLIN ALCOHOL/MO/W.PET/CERES
800 CREAM (GRAM) TOPICAL
Status: DISCONTINUED | OUTPATIENT
Start: 2017-12-18 | End: 2017-12-29

## 2017-12-18 RX ORDER — ACETAMINOPHEN 650 MG/20.3ML
650 LIQUID ORAL EVERY 4 HOURS PRN
Status: DISCONTINUED | OUTPATIENT
Start: 2017-12-18 | End: 2017-12-18

## 2017-12-18 RX ORDER — POTASSIUM CHLORIDE 20 MEQ/15ML
60 SOLUTION ORAL
Status: DISCONTINUED | OUTPATIENT
Start: 2017-12-18 | End: 2017-12-29

## 2017-12-18 RX ORDER — ACETAMINOPHEN 650 MG/20.3ML
650 LIQUID ORAL EVERY 4 HOURS PRN
Status: DISCONTINUED | OUTPATIENT
Start: 2017-12-18 | End: 2018-01-19

## 2017-12-18 RX ORDER — POTASSIUM CHLORIDE 20 MEQ/15ML
40 SOLUTION ORAL
Status: DISCONTINUED | OUTPATIENT
Start: 2017-12-18 | End: 2017-12-29

## 2017-12-18 RX ORDER — LEVETIRACETAM 5 MG/ML
1000 INJECTION INTRAVASCULAR EVERY 12 HOURS
Status: DISCONTINUED | OUTPATIENT
Start: 2017-12-18 | End: 2017-12-19

## 2017-12-18 RX ORDER — LEVETIRACETAM 5 MG/ML
500 INJECTION INTRAVASCULAR ONCE
Status: COMPLETED | OUTPATIENT
Start: 2017-12-18 | End: 2017-12-18

## 2017-12-18 RX ADMIN — BETHANECHOL CHLORIDE 10 MG: 10 TABLET ORAL at 12:12

## 2017-12-18 RX ADMIN — POTASSIUM & SODIUM PHOSPHATES POWDER PACK 280-160-250 MG 2 PACKET: 280-160-250 PACK at 02:12

## 2017-12-18 RX ADMIN — POTASSIUM CHLORIDE 20 MEQ: 1500 TABLET, EXTENDED RELEASE ORAL at 08:12

## 2017-12-18 RX ADMIN — PIPERACILLIN SODIUM AND TAZOBACTAM SODIUM 4.5 G: 4; .5 INJECTION, POWDER, LYOPHILIZED, FOR SOLUTION INTRAVENOUS at 03:12

## 2017-12-18 RX ADMIN — LEVETIRACETAM 500 MG: 5 INJECTION INTRAVENOUS at 12:12

## 2017-12-18 RX ADMIN — CYANOCOBALAMIN TAB 1000 MCG 2500 MCG: 1000 TAB at 08:12

## 2017-12-18 RX ADMIN — FINASTERIDE 5 MG: 5 TABLET, FILM COATED ORAL at 08:12

## 2017-12-18 RX ADMIN — DONEPEZIL HYDROCHLORIDE 5 MG: 5 TABLET, FILM COATED ORAL at 09:12

## 2017-12-18 RX ADMIN — TAMSULOSIN HYDROCHLORIDE 0.4 MG: 0.4 CAPSULE ORAL at 08:12

## 2017-12-18 RX ADMIN — CHLORHEXIDINE GLUCONATE 15 ML: 1.2 RINSE ORAL at 09:12

## 2017-12-18 RX ADMIN — LEVETIRACETAM 500 MG: 5 INJECTION INTRAVENOUS at 08:12

## 2017-12-18 RX ADMIN — FAMOTIDINE 20 MG: 20 TABLET, FILM COATED ORAL at 08:12

## 2017-12-18 RX ADMIN — LEVETIRACETAM 1000 MG: 5 INJECTION INTRAVENOUS at 10:12

## 2017-12-18 RX ADMIN — BACLOFEN 20 MG: 10 TABLET ORAL at 10:12

## 2017-12-18 RX ADMIN — POLYETHYLENE GLYCOL 3350 17 G: 17 POWDER, FOR SOLUTION ORAL at 08:12

## 2017-12-18 RX ADMIN — MAGNESIUM OXIDE TAB 400 MG (241.3 MG ELEMENTAL MG) 800 MG: 400 (241.3 MG) TAB at 05:12

## 2017-12-18 RX ADMIN — BETHANECHOL CHLORIDE 10 MG: 10 TABLET ORAL at 02:12

## 2017-12-18 RX ADMIN — PIPERACILLIN SODIUM AND TAZOBACTAM SODIUM 4.5 G: 4; .5 INJECTION, POWDER, LYOPHILIZED, FOR SOLUTION INTRAVENOUS at 10:12

## 2017-12-18 RX ADMIN — ENOXAPARIN SODIUM 40 MG: 100 INJECTION SUBCUTANEOUS at 05:12

## 2017-12-18 RX ADMIN — POTASSIUM CHLORIDE 40 MEQ: 20 SOLUTION ORAL at 05:12

## 2017-12-18 RX ADMIN — LEVOTHYROXINE SODIUM 75 MCG: 75 TABLET ORAL at 08:12

## 2017-12-18 RX ADMIN — MUPIROCIN: 20 OINTMENT TOPICAL at 08:12

## 2017-12-18 RX ADMIN — PIPERACILLIN SODIUM AND TAZOBACTAM SODIUM 4.5 G: 4; .5 INJECTION, POWDER, LYOPHILIZED, FOR SOLUTION INTRAVENOUS at 06:12

## 2017-12-18 RX ADMIN — MUPIROCIN: 20 OINTMENT TOPICAL at 10:12

## 2017-12-18 RX ADMIN — ATORVASTATIN CALCIUM 40 MG: 20 TABLET, FILM COATED ORAL at 08:12

## 2017-12-18 RX ADMIN — STANDARDIZED SENNA CONCENTRATE AND DOCUSATE SODIUM 1 TABLET: 8.6; 5 TABLET, FILM COATED ORAL at 08:12

## 2017-12-18 RX ADMIN — BETHANECHOL CHLORIDE 10 MG: 10 TABLET ORAL at 10:12

## 2017-12-18 RX ADMIN — VANCOMYCIN HYDROCHLORIDE 1000 MG: 1 INJECTION, POWDER, LYOPHILIZED, FOR SOLUTION INTRAVENOUS at 01:12

## 2017-12-18 RX ADMIN — CHLORHEXIDINE GLUCONATE 15 ML: 1.2 RINSE ORAL at 10:12

## 2017-12-18 RX ADMIN — BACLOFEN 20 MG: 10 TABLET ORAL at 02:12

## 2017-12-18 RX ADMIN — VITAMIN C 1 TABLET: TAB at 08:12

## 2017-12-18 RX ADMIN — FAMOTIDINE 20 MG: 20 TABLET, FILM COATED ORAL at 10:12

## 2017-12-18 RX ADMIN — BACLOFEN 20 MG: 10 TABLET ORAL at 05:12

## 2017-12-18 RX ADMIN — FLUOXETINE 20 MG: 20 CAPSULE ORAL at 08:12

## 2017-12-18 RX ADMIN — OXYCODONE HYDROCHLORIDE AND ACETAMINOPHEN 500 MG: 500 TABLET ORAL at 08:12

## 2017-12-18 RX ADMIN — TAMSULOSIN HYDROCHLORIDE 0.4 MG: 0.4 CAPSULE ORAL at 10:12

## 2017-12-18 RX ADMIN — BETHANECHOL CHLORIDE 10 MG: 10 TABLET ORAL at 05:12

## 2017-12-18 RX ADMIN — POTASSIUM & SODIUM PHOSPHATES POWDER PACK 280-160-250 MG 2 PACKET: 280-160-250 PACK at 10:12

## 2017-12-18 RX ADMIN — ASPIRIN 81 MG: 81 TABLET, COATED ORAL at 08:12

## 2017-12-18 RX ADMIN — POTASSIUM & SODIUM PHOSPHATES POWDER PACK 280-160-250 MG 2 PACKET: 280-160-250 PACK at 05:12

## 2017-12-18 NOTE — PLAN OF CARE
Problem: Patient Care Overview  Goal: Plan of Care Review  Outcome: Ongoing (interventions implemented as appropriate)  POC reviewed with pt at 0300. Pt unable to verbalize understanding, pt needs reinforcement. Questions and concerns addressed. No acute events today. NS @ 50, condom catheter in place, cEEG in place. Pt needs trach to be suctioned almost every hour, thin creamy secretions. Pt progressing toward goals. Will continue to monitor. See flowsheets for full assessment and VS info.

## 2017-12-18 NOTE — CONSULTS
Consult received on patient. Blanchable redness noted to patient's sacral spine. No open wounds noted. Patient incontinent. Rectal thermometer in place. Recommend applying barrier cream qshift and as needed. Patient in neuro icu on choco low airloss surface. Turn every 2hrs. Nursing to continue care.

## 2017-12-18 NOTE — PROGRESS NOTES
[facility]  Neurocritical Care  Progress Note    Admit Date: 12/17/2017  Service Date: 12/18/2017  Length of Stay: 1    Subjective:     Chief Complaint: Seizure    History of Present Illness: Mr dickerson is a  is a 67 y.o. 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    who presents to [facility] for evaluation of new onset Seizure activity. He presented to outside ED with seizure activity for approximately 20 minutes. Per EMS, he was diaphoretic on arrival and tachycardic. Per nursing home, pt is nonverbal at baseline, but can focus on the speaker. He has a trach in place. He is being admitted to [facility] for a higher level of care.     Hospital Course: 12/17: admit to [facility], EEG pending, Keppra 1 G  at OSH and 500 Q 12 started, CTH: No acute process   12/18:increase keppra per epilepsy, sputum culture, trend procal    Review of Symptoms:   Unable to obtain, trach, baseline is tracking examiner    Physical Exam:  GA: comfortable, no acute distress.   HEENT: No scleral icterus or JVD.   Pulmonary: Clear to auscultation A/L. No wheezing, crackles, or rhonchi. intubated  Cardiac: RRR S1 & S2 w/o rubs/murmurs/gallops.   Abdominal: Bowel sounds present x 4. No appreciable hepatosplenomegaly.  Skin: No jaundice, rashes, or visible lesions.  Pulses: 1+ Dp bilat    Neuro:  --sedation: none  --GCS: [id number], M3  --Mental Status:  Aphasia, no commands  --CN II-XII grossly intact.   --Pupils 3-->2mm, PERRL.   --brainstem: good cough and gag  --Motor: bilat upper extremity contracted, no commands to feet bilat, minimal withdrawal to pain  --sensory: see motor  --Reflexes: not tested  --Gait: deferred      Recent Labs  Lab 12/18/17  0250   WBC 13.28*   RBC 2.93*   HGB 9.0*   HCT 28.6*   *   MCV 98   MCH 30.7   MCHC 31.5*       Recent Labs  Lab 12/18/17  0250   CALCIUM 8.6*   PROT 6.7      K 3.6   CO2 18*   *   BUN 40*   CREATININE 1.2    ALKPHOS 150*   *   AST 48*   BILITOT 0.5     No results for input(s): PT, INR, APTT in the last 24 hours.  Oxygen Concentration (%):  [50] 50      I have personally reviewed all labs, imaging, and studies today        Assessment/Plan:     Neuro   * Seizure    - epilepsy following  -increase keppra to 1g BID        Altered mental status    - Tracks at baseline   - See seizure   - potentially provoked seizure  - will send sputum culture        Aphasia    - Hx  prior Stroke        Cardiac/Vascular   Essential hypertension    - SBP < 180   - Obtain home medication list   - PRN Labetalol         Renal/   CKD (chronic kidney disease) stage 3, GFR 30-59 ml/min    - Monitor labs   Monitor I/Os        GI   Dysphagia    - NPO  -  meds per peg             Prophylaxis:  Venous Thromboembolism: chemical  Stress Ulcer: H2B  Ventilator Pneumonia: yes     Activity Orders          Ambulate with assistance starting at 12/17 0447        Prior    Smooth Hernandez MD  Neurocritical Care  [facility]    Cc time 38 minutes  Critical Care was time spent personally by me on the following activities: development of treatment plan with patient or surrogate and bedside caregivers, discussions with consultants, evaluation of patient's response to treatment, examination of patient, ordering and performing treatments and interventions, ordering and review of laboratory studies, ordering and review of radiographic studies, pulse oximetry, re-evaluation of patient's condition. This critical care time did not overlap with that of any other provider or involve time for any procedures.

## 2017-12-18 NOTE — PLAN OF CARE
Problem: Patient Care Overview  Goal: Plan of Care Review  Outcome: Ongoing (interventions implemented as appropriate)  POC reviewed with pt daughter via phone at 1400. Pts daughter verbalized understanding, patient unable to verbalize understanding secondary to expressive aphasia. Questions and concerns addressed. No acute events today. Pt progressing toward goals. Will continue to monitor. See flowsheets for full assessment and VS info.

## 2017-12-18 NOTE — PLAN OF CARE
[facility] [address] [place] [address] [address]  [address]  [facility] [address]  Phone: [phone] Fax: [phone]    This CM spoke with patient daughter by phone. Daughter said patient was here a year ago and was sent to [facility] in the state he is in now.       12/18/17 1234   Discharge Assessment   Assessment Type Discharge Planning Assessment   Confirmed/corrected address and phone number on facesheet? Yes   Assessment information obtained from? Caregiver   Expected Length of Stay (days) 3   Communicated expected length of stay with patient/caregiver yes   Prior to hospitilization cognitive status: Unable to Assess   Prior to hospitalization functional status: Assistive Equipment;Needs Assistance   Current cognitive status: Unable to Assess   Current Functional Status: Completely Dependent   Facility Arrived From: (arrive via ambulance from [facility])   Lives With alone   Able to Return to Prior Arrangements yes   Is patient able to care for self after discharge? Unable to determine at this time (comments)   Who are your caregiver(s) and their phone number(s)? (daughter Howard Rivera [phone])   Patient's perception of discharge disposition other (comments)  (lydia)   Readmission Within The Last 30 Days no previous admission in last 30 days   Patient currently being followed by outpatient case management? No   Patient currently receives any other outside agency services? No   Equipment Currently Used at Home cane, straight   Do you have any problems affording any of your prescribed medications? No   Is the patient taking medications as prescribed? yes   Does the patient have transportation home? Yes   Transportation Available ambulance   Does the patient receive services at the [facility]? No   Discharge Plan A Return to nursing home   Discharge Plan B Return to Nursing Home   Patient/Family In Agreement With Plan yes      Marisol Schroeder RN/HASMUKH  [phone]  [facility]

## 2017-12-19 PROBLEM — G92.9 TOXIC ENCEPHALOPATHY: Status: ACTIVE | Noted: 2017-12-17

## 2017-12-19 PROBLEM — N30.00 ACUTE CYSTITIS WITHOUT HEMATURIA: Status: ACTIVE | Noted: 2017-12-19

## 2017-12-19 PROBLEM — J18.9 PNEUMONIA DUE TO INFECTIOUS ORGANISM: Status: ACTIVE | Noted: 2017-12-19

## 2017-12-19 LAB
ALBUMIN SERPL BCP-MCNC: 2.1 G/DL
ALP SERPL-CCNC: 166 U/L
ALT SERPL W/O P-5'-P-CCNC: 102 U/L
ANION GAP SERPL CALC-SCNC: 6 MMOL/L
AST SERPL-CCNC: 41 U/L
BACTERIA UR CULT: NORMAL
BASOPHILS # BLD AUTO: 0 K/UL
BASOPHILS NFR BLD: 0 %
BILIRUB SERPL-MCNC: 0.5 MG/DL
BUN SERPL-MCNC: 32 MG/DL
CALCIUM SERPL-MCNC: 8.1 MG/DL
CHLORIDE SERPL-SCNC: 117 MMOL/L
CO2 SERPL-SCNC: 19 MMOL/L
CREAT SERPL-MCNC: 1 MG/DL
DIFFERENTIAL METHOD: ABNORMAL
EOSINOPHIL # BLD AUTO: 0.8 K/UL
EOSINOPHIL NFR BLD: 9.1 %
ERYTHROCYTE [DISTWIDTH] IN BLOOD BY AUTOMATED COUNT: 16.4 %
EST. GFR  (AFRICAN AMERICAN): >60 ML/MIN/1.73 M^2
EST. GFR  (NON AFRICAN AMERICAN): >60 ML/MIN/1.73 M^2
GLUCOSE SERPL-MCNC: 168 MG/DL
HCT VFR BLD AUTO: 28.8 %
HGB BLD-MCNC: 9.3 G/DL
IMM GRANULOCYTES # BLD AUTO: 0.04 K/UL
IMM GRANULOCYTES NFR BLD AUTO: 0.5 %
LYMPHOCYTES # BLD AUTO: 0.3 K/UL
LYMPHOCYTES NFR BLD: 3.9 %
MAGNESIUM SERPL-MCNC: 1.4 MG/DL
MCH RBC QN AUTO: 31.2 PG
MCHC RBC AUTO-ENTMCNC: 32.3 G/DL
MCV RBC AUTO: 97 FL
MONOCYTES # BLD AUTO: 0.3 K/UL
MONOCYTES NFR BLD: 4.1 %
NEUTROPHILS # BLD AUTO: 6.8 K/UL
NEUTROPHILS NFR BLD: 82.4 %
NRBC BLD-RTO: 0 /100 WBC
PHOSPHATE SERPL-MCNC: 2.2 MG/DL
PLATELET # BLD AUTO: 145 K/UL
PMV BLD AUTO: 11.6 FL
POCT GLUCOSE: 122 MG/DL (ref 70–110)
POCT GLUCOSE: 146 MG/DL (ref 70–110)
POTASSIUM SERPL-SCNC: 3.3 MMOL/L
PROCALCITONIN SERPL IA-MCNC: 5.62 NG/ML
PROT SERPL-MCNC: 6.4 G/DL
RBC # BLD AUTO: 2.98 M/UL
SODIUM SERPL-SCNC: 142 MMOL/L
WBC # BLD AUTO: 8.28 K/UL

## 2017-12-19 PROCEDURE — 99291 CRITICAL CARE FIRST HOUR: CPT | Mod: ,,, | Performed by: PSYCHIATRY & NEUROLOGY

## 2017-12-19 PROCEDURE — 63600175 PHARM REV CODE 636 W HCPCS: Performed by: PSYCHIATRY & NEUROLOGY

## 2017-12-19 PROCEDURE — 94761 N-INVAS EAR/PLS OXIMETRY MLT: CPT

## 2017-12-19 PROCEDURE — 85025 COMPLETE CBC W/AUTO DIFF WBC: CPT

## 2017-12-19 PROCEDURE — 83735 ASSAY OF MAGNESIUM: CPT

## 2017-12-19 PROCEDURE — 25000003 PHARM REV CODE 250: Performed by: NURSE PRACTITIONER

## 2017-12-19 PROCEDURE — 25000003 PHARM REV CODE 250: Performed by: PHYSICIAN ASSISTANT

## 2017-12-19 PROCEDURE — 95951 PR EEG MONITORING/VIDEORECORD: CPT | Mod: 26,,, | Performed by: PSYCHIATRY & NEUROLOGY

## 2017-12-19 PROCEDURE — 95951 HC EEG MONITORING/VIDEO RECORD: CPT

## 2017-12-19 PROCEDURE — 80053 COMPREHEN METABOLIC PANEL: CPT

## 2017-12-19 PROCEDURE — 27000221 HC OXYGEN, UP TO 24 HOURS

## 2017-12-19 PROCEDURE — 84145 PROCALCITONIN (PCT): CPT

## 2017-12-19 PROCEDURE — 25000003 PHARM REV CODE 250: Performed by: PSYCHIATRY & NEUROLOGY

## 2017-12-19 PROCEDURE — 20000000 HC ICU ROOM

## 2017-12-19 PROCEDURE — 63600175 PHARM REV CODE 636 W HCPCS: Performed by: PHYSICIAN ASSISTANT

## 2017-12-19 PROCEDURE — 84100 ASSAY OF PHOSPHORUS: CPT

## 2017-12-19 PROCEDURE — 99900026 HC AIRWAY MAINTENANCE (STAT)

## 2017-12-19 RX ORDER — NAPROXEN SODIUM 220 MG/1
81 TABLET, FILM COATED ORAL DAILY
Status: DISCONTINUED | OUTPATIENT
Start: 2017-12-20 | End: 2018-01-24 | Stop reason: HOSPADM

## 2017-12-19 RX ORDER — LEVETIRACETAM 100 MG/ML
1000 SOLUTION ORAL 2 TIMES DAILY
Status: DISCONTINUED | OUTPATIENT
Start: 2017-12-19 | End: 2018-01-24 | Stop reason: HOSPADM

## 2017-12-19 RX ADMIN — MUPIROCIN: 20 OINTMENT TOPICAL at 09:12

## 2017-12-19 RX ADMIN — CHLORHEXIDINE GLUCONATE 15 ML: 1.2 RINSE ORAL at 09:12

## 2017-12-19 RX ADMIN — POTASSIUM CHLORIDE 40 MEQ: 20 SOLUTION ORAL at 06:12

## 2017-12-19 RX ADMIN — POTASSIUM CHLORIDE 40 MEQ: 20 SOLUTION ORAL at 01:12

## 2017-12-19 RX ADMIN — PIPERACILLIN SODIUM AND TAZOBACTAM SODIUM 4.5 G: 4; .5 INJECTION, POWDER, LYOPHILIZED, FOR SOLUTION INTRAVENOUS at 09:12

## 2017-12-19 RX ADMIN — VITAMIN C 1 TABLET: TAB at 08:12

## 2017-12-19 RX ADMIN — ACETAMINOPHEN 650 MG: 650 SOLUTION ORAL at 01:12

## 2017-12-19 RX ADMIN — BETHANECHOL CHLORIDE 10 MG: 10 TABLET ORAL at 03:12

## 2017-12-19 RX ADMIN — FINASTERIDE 5 MG: 5 TABLET, FILM COATED ORAL at 08:12

## 2017-12-19 RX ADMIN — POTASSIUM & SODIUM PHOSPHATES POWDER PACK 280-160-250 MG 2 PACKET: 280-160-250 PACK at 01:12

## 2017-12-19 RX ADMIN — FAMOTIDINE 20 MG: 20 TABLET, FILM COATED ORAL at 08:12

## 2017-12-19 RX ADMIN — BETHANECHOL CHLORIDE 10 MG: 10 TABLET ORAL at 09:12

## 2017-12-19 RX ADMIN — FAMOTIDINE 20 MG: 20 TABLET, FILM COATED ORAL at 09:12

## 2017-12-19 RX ADMIN — POTASSIUM & SODIUM PHOSPHATES POWDER PACK 280-160-250 MG 2 PACKET: 280-160-250 PACK at 05:12

## 2017-12-19 RX ADMIN — DONEPEZIL HYDROCHLORIDE 5 MG: 5 TABLET, FILM COATED ORAL at 09:12

## 2017-12-19 RX ADMIN — MAGNESIUM OXIDE TAB 400 MG (241.3 MG ELEMENTAL MG) 800 MG: 400 (241.3 MG) TAB at 01:12

## 2017-12-19 RX ADMIN — ACETAMINOPHEN 650 MG: 650 SOLUTION ORAL at 11:12

## 2017-12-19 RX ADMIN — LEVOTHYROXINE SODIUM 75 MCG: 75 TABLET ORAL at 08:12

## 2017-12-19 RX ADMIN — FLUOXETINE 20 MG: 20 CAPSULE ORAL at 08:12

## 2017-12-19 RX ADMIN — ATORVASTATIN CALCIUM 40 MG: 20 TABLET, FILM COATED ORAL at 08:12

## 2017-12-19 RX ADMIN — BETHANECHOL CHLORIDE 10 MG: 10 TABLET ORAL at 05:12

## 2017-12-19 RX ADMIN — PIPERACILLIN SODIUM AND TAZOBACTAM SODIUM 4.5 G: 4; .5 INJECTION, POWDER, LYOPHILIZED, FOR SOLUTION INTRAVENOUS at 05:12

## 2017-12-19 RX ADMIN — MAGNESIUM OXIDE TAB 400 MG (241.3 MG ELEMENTAL MG) 800 MG: 400 (241.3 MG) TAB at 08:12

## 2017-12-19 RX ADMIN — TAMSULOSIN HYDROCHLORIDE 0.4 MG: 0.4 CAPSULE ORAL at 09:12

## 2017-12-19 RX ADMIN — PIPERACILLIN SODIUM AND TAZOBACTAM SODIUM 4.5 G: 4; .5 INJECTION, POWDER, LYOPHILIZED, FOR SOLUTION INTRAVENOUS at 02:12

## 2017-12-19 RX ADMIN — POTASSIUM CHLORIDE 40 MEQ: 20 SOLUTION ORAL at 04:12

## 2017-12-19 RX ADMIN — MAGNESIUM OXIDE TAB 400 MG (241.3 MG ELEMENTAL MG) 800 MG: 400 (241.3 MG) TAB at 05:12

## 2017-12-19 RX ADMIN — BACLOFEN 20 MG: 10 TABLET ORAL at 09:12

## 2017-12-19 RX ADMIN — POTASSIUM & SODIUM PHOSPHATES POWDER PACK 280-160-250 MG 2 PACKET: 280-160-250 PACK at 08:12

## 2017-12-19 RX ADMIN — ENOXAPARIN SODIUM 40 MG: 100 INJECTION SUBCUTANEOUS at 04:12

## 2017-12-19 RX ADMIN — LEVETIRACETAM 1000 MG: 100 SOLUTION ORAL at 09:12

## 2017-12-19 RX ADMIN — MUPIROCIN: 20 OINTMENT TOPICAL at 08:12

## 2017-12-19 RX ADMIN — BACLOFEN 20 MG: 10 TABLET ORAL at 05:12

## 2017-12-19 RX ADMIN — LEVETIRACETAM 1000 MG: 5 INJECTION INTRAVENOUS at 08:12

## 2017-12-19 RX ADMIN — TAMSULOSIN HYDROCHLORIDE 0.4 MG: 0.4 CAPSULE ORAL at 08:12

## 2017-12-19 RX ADMIN — VANCOMYCIN HYDROCHLORIDE 1000 MG: 1 INJECTION, POWDER, LYOPHILIZED, FOR SOLUTION INTRAVENOUS at 02:12

## 2017-12-19 RX ADMIN — BACLOFEN 20 MG: 10 TABLET ORAL at 01:12

## 2017-12-19 RX ADMIN — ASPIRIN 81 MG: 81 TABLET, COATED ORAL at 08:12

## 2017-12-19 NOTE — PLAN OF CARE
SW advised by RN that the Pt daughter has concerns about his previous placement at [facility] and wants to discuss new options for placement if possible.     SW met with Pt daughter at bedside. Provided list of [facility] for her review. She will tour this week and work towards a new placement as she does not want the Pt to return to [facility]. Addressed questions about making the change and will continue to follow.    Annie Payan LMSW  Neurocritical Care   [facility]  [id number]

## 2017-12-19 NOTE — PROGRESS NOTES
[facility]  Neurocritical Care  Progress Note    Admit Date: 12/17/2017  Service Date: 12/19/2017  Length of Stay: 2    Subjective:     Chief Complaint: Seizure    History of Present Illness: Mr dickerson is a  is a 67 y.o. 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    who presents to [facility] for evaluation of new onset Seizure activity. He presented to outside ED with seizure activity for approximately 20 minutes. Per EMS, he was diaphoretic on arrival and tachycardic. Per nursing home, pt is nonverbal at baseline, but can focus on the speaker. He has a trach in place. He is being admitted to [facility] for a higher level of care.     Hospital Course: 12/17: admit to [facility], EEG pending, Keppra 1 G  at OSH and 500 Q 12 started, CTH: No acute process   12/18:increase keppra per epilepsy, sputum culture, trend procal  12/19: start RISS, discussed with daughter at bedside, not happy with Nao    Review of Symptoms:   Unable to obtain, trach, baseline is tracking examiner, aphasic at baseline     Physical Exam:  GA: comfortable, no acute distress.   HEENT: No scleral icterus or JVD.   Pulmonary: Clear to auscultation A/L. No wheezing, crackles, or rhonchi. intubated  Cardiac: RRR S1 & S2 w/o rubs/murmurs/gallops.   Abdominal: Bowel sounds present x 4. No appreciable hepatosplenomegaly.  Skin: No jaundice, rashes, or visible lesions.  Pulses: 1+ Dp bilat     Neuro:  --sedation: none  --GCS: [id number], M3  --Mental Status:  Aphasia, no commands, appears more awake this morning  --CN II-XII grossly intact.   --Pupils 3-->2mm, PERRL.   --brainstem: good cough and gag  --Motor: bilat upper extremity contracted, no commands to feet bilat, minimal withdrawal to pain  --sensory: see motor  --Reflexes: not tested  --Gait: deferred    Assessment/Plan:     Neuro   * Seizure    - epilepsy following  -keppra  1g BID        Toxic encephalopathy    -  secondary to pneumonia, uti, dehydration        Aphasia    - Hx  prior Stroke        Pulmonary   Pneumonia due to infectious organism    POA  Continue vanc and zosyn  Chest PT  Suctioning          Cardiac/Vascular   Essential hypertension    - SBP < 180   - Obtain home medication list   - PRN Labetalol         Renal/   Acute cystitis without hematuria    POA  Continue vanc and zosyn  Condom cath        CKD (chronic kidney disease) stage 3, GFR 30-59 ml/min    - Monitor labs   Monitor I/Os        GI   Dysphagia    - NPO  -  meds per peg   -tube feeds            Prophylaxis:  Venous Thromboembolism: chemical  Stress Ulcer: H2B  Ventilator Pneumonia: yes     Discussed with daughter at bedside. She is not happy with the care at [place]. We discussed having SW come by and discuss other options today    Activity Orders          Ambulate with assistance starting at 12/17 0447        Prior    Smooth Hernandez MD  Neurocritical Care  [facility]    Cc time 35 minutes  Critical Care was time spent personally by me on the following activities: development of treatment plan with patient or surrogate and bedside caregivers, discussions with consultants, evaluation of patient's response to treatment, examination of patient, ordering and performing treatments and interventions, ordering and review of laboratory studies, ordering and review of radiographic studies, pulse oximetry, re-evaluation of patient's condition. This critical care time did not overlap with that of any other provider or involve time for any procedures.

## 2017-12-19 NOTE — PLAN OF CARE
Problem: Patient Care Overview  Goal: Plan of Care Review  Outcome: Ongoing (interventions implemented as appropriate)  POC reviewed with Enrique at 0500. Enrique unable to verbalize understanding r/t trach and nonverbal. Questions and concerns addressed. No acute events overnight. Enrique is  progressing toward goals. Will continue to monitor. See flowsheets for full assessment and VS info

## 2017-12-19 NOTE — PLAN OF CARE
Problem: Patient Care Overview  Goal: Plan of Care Review  Outcome: Ongoing (interventions implemented as appropriate)  VS and assessment per flow sheet, patient progressing towards goal as tolerated. Plan of care reviewed with Enrique HENAO Bc and daughter, all concerns addressed. Will continue to monitor.

## 2017-12-20 LAB
ALBUMIN SERPL BCP-MCNC: 2.4 G/DL
ALP SERPL-CCNC: 197 U/L
ALT SERPL W/O P-5'-P-CCNC: 95 U/L
ANION GAP SERPL CALC-SCNC: 6 MMOL/L
ANISOCYTOSIS BLD QL SMEAR: ABNORMAL
AST SERPL-CCNC: 33 U/L
BASOPHILS # BLD AUTO: 0 K/UL
BASOPHILS NFR BLD: 0 %
BILIRUB SERPL-MCNC: 0.5 MG/DL
BUN SERPL-MCNC: 26 MG/DL
CALCIUM SERPL-MCNC: 8.5 MG/DL
CHLORIDE SERPL-SCNC: 108 MMOL/L
CO2 SERPL-SCNC: 20 MMOL/L
CREAT SERPL-MCNC: 1.1 MG/DL
DIFFERENTIAL METHOD: ABNORMAL
DOHLE BOD BLD QL SMEAR: PRESENT
EOSINOPHIL # BLD AUTO: 0.5 K/UL
EOSINOPHIL NFR BLD: 5.4 %
ERYTHROCYTE [DISTWIDTH] IN BLOOD BY AUTOMATED COUNT: 17.1 %
EST. GFR  (AFRICAN AMERICAN): >60 ML/MIN/1.73 M^2
EST. GFR  (NON AFRICAN AMERICAN): >60 ML/MIN/1.73 M^2
GLUCOSE SERPL-MCNC: 323 MG/DL
HCT VFR BLD AUTO: 29.1 %
HGB BLD-MCNC: 8.8 G/DL
HYPOCHROMIA BLD QL SMEAR: ABNORMAL
IMM GRANULOCYTES # BLD AUTO: 0.19 K/UL
IMM GRANULOCYTES NFR BLD AUTO: 2.2 %
LYMPHOCYTES # BLD AUTO: 1.8 K/UL
LYMPHOCYTES NFR BLD: 20.7 %
MAGNESIUM SERPL-MCNC: 1.5 MG/DL
MAGNESIUM SERPL-MCNC: 1.6 MG/DL
MCH RBC QN AUTO: 30.7 PG
MCHC RBC AUTO-ENTMCNC: 30.2 G/DL
MCV RBC AUTO: 101 FL
MONOCYTES # BLD AUTO: 0.9 K/UL
MONOCYTES NFR BLD: 10.3 %
NEUTROPHILS # BLD AUTO: 5.3 K/UL
NEUTROPHILS NFR BLD: 61.4 %
NRBC BLD-RTO: 0 /100 WBC
PHOSPHATE SERPL-MCNC: 2.3 MG/DL
PHOSPHATE SERPL-MCNC: 3.3 MG/DL
PLATELET # BLD AUTO: 132 K/UL
PMV BLD AUTO: 11.6 FL
POCT GLUCOSE: 117 MG/DL (ref 70–110)
POCT GLUCOSE: 117 MG/DL (ref 70–110)
POCT GLUCOSE: 129 MG/DL (ref 70–110)
POIKILOCYTOSIS BLD QL SMEAR: ABNORMAL
POLYCHROMASIA BLD QL SMEAR: ABNORMAL
POTASSIUM SERPL-SCNC: 4.4 MMOL/L
PROCALCITONIN SERPL IA-MCNC: 2.79 NG/ML
PROT SERPL-MCNC: 7.3 G/DL
RBC # BLD AUTO: 2.87 M/UL
SODIUM SERPL-SCNC: 134 MMOL/L
SODIUM SERPL-SCNC: 140 MMOL/L
TOXIC GRANULES BLD QL SMEAR: ABNORMAL
VANCOMYCIN TROUGH SERPL-MCNC: 12.1 UG/ML
WBC # BLD AUTO: 8.63 K/UL

## 2017-12-20 PROCEDURE — 84145 PROCALCITONIN (PCT): CPT

## 2017-12-20 PROCEDURE — 25000003 PHARM REV CODE 250: Performed by: NURSE PRACTITIONER

## 2017-12-20 PROCEDURE — 83735 ASSAY OF MAGNESIUM: CPT

## 2017-12-20 PROCEDURE — 95813 EEG EXTND MNTR 61-119 MIN: CPT | Mod: 26,,, | Performed by: PSYCHIATRY & NEUROLOGY

## 2017-12-20 PROCEDURE — 25000003 PHARM REV CODE 250: Performed by: PSYCHIATRY & NEUROLOGY

## 2017-12-20 PROCEDURE — 63600175 PHARM REV CODE 636 W HCPCS: Performed by: PHYSICIAN ASSISTANT

## 2017-12-20 PROCEDURE — 84100 ASSAY OF PHOSPHORUS: CPT | Mod: 91

## 2017-12-20 PROCEDURE — 63600175 PHARM REV CODE 636 W HCPCS: Performed by: PSYCHIATRY & NEUROLOGY

## 2017-12-20 PROCEDURE — 27000221 HC OXYGEN, UP TO 24 HOURS

## 2017-12-20 PROCEDURE — 20000000 HC ICU ROOM

## 2017-12-20 PROCEDURE — 85025 COMPLETE CBC W/AUTO DIFF WBC: CPT

## 2017-12-20 PROCEDURE — 25000003 PHARM REV CODE 250: Performed by: PHYSICIAN ASSISTANT

## 2017-12-20 PROCEDURE — 99233 SBSQ HOSP IP/OBS HIGH 50: CPT | Mod: GC,,, | Performed by: PSYCHIATRY & NEUROLOGY

## 2017-12-20 PROCEDURE — 80202 ASSAY OF VANCOMYCIN: CPT

## 2017-12-20 PROCEDURE — 84295 ASSAY OF SERUM SODIUM: CPT

## 2017-12-20 PROCEDURE — 80053 COMPREHEN METABOLIC PANEL: CPT

## 2017-12-20 RX ORDER — GLUCAGON 1 MG
1 KIT INJECTION
Status: DISCONTINUED | OUTPATIENT
Start: 2017-12-20 | End: 2018-01-24 | Stop reason: HOSPADM

## 2017-12-20 RX ORDER — INSULIN ASPART 100 [IU]/ML
0-5 INJECTION, SOLUTION INTRAVENOUS; SUBCUTANEOUS EVERY 4 HOURS PRN
Status: DISCONTINUED | OUTPATIENT
Start: 2017-12-20 | End: 2018-01-24 | Stop reason: HOSPADM

## 2017-12-20 RX ORDER — SODIUM CHLORIDE 9 MG/ML
INJECTION, SOLUTION INTRAVENOUS CONTINUOUS
Status: DISCONTINUED | OUTPATIENT
Start: 2017-12-20 | End: 2017-12-22

## 2017-12-20 RX ADMIN — TAMSULOSIN HYDROCHLORIDE 0.4 MG: 0.4 CAPSULE ORAL at 09:12

## 2017-12-20 RX ADMIN — LEVETIRACETAM 1000 MG: 100 SOLUTION ORAL at 09:12

## 2017-12-20 RX ADMIN — ASPIRIN 81 MG CHEWABLE TABLET 81 MG: 81 TABLET CHEWABLE at 09:12

## 2017-12-20 RX ADMIN — ACETAMINOPHEN 650 MG: 650 SOLUTION ORAL at 05:12

## 2017-12-20 RX ADMIN — BETHANECHOL CHLORIDE 10 MG: 10 TABLET ORAL at 02:12

## 2017-12-20 RX ADMIN — CHLORHEXIDINE GLUCONATE 15 ML: 1.2 RINSE ORAL at 09:12

## 2017-12-20 RX ADMIN — FAMOTIDINE 20 MG: 20 TABLET, FILM COATED ORAL at 09:12

## 2017-12-20 RX ADMIN — MUPIROCIN: 20 OINTMENT TOPICAL at 09:12

## 2017-12-20 RX ADMIN — MAGNESIUM OXIDE TAB 400 MG (241.3 MG ELEMENTAL MG) 800 MG: 400 (241.3 MG) TAB at 05:12

## 2017-12-20 RX ADMIN — ATORVASTATIN CALCIUM 40 MG: 20 TABLET, FILM COATED ORAL at 09:12

## 2017-12-20 RX ADMIN — VANCOMYCIN HYDROCHLORIDE 1000 MG: 1 INJECTION, POWDER, LYOPHILIZED, FOR SOLUTION INTRAVENOUS at 01:12

## 2017-12-20 RX ADMIN — MAGNESIUM OXIDE TAB 400 MG (241.3 MG ELEMENTAL MG) 800 MG: 400 (241.3 MG) TAB at 09:12

## 2017-12-20 RX ADMIN — FLUOXETINE 20 MG: 20 CAPSULE ORAL at 09:12

## 2017-12-20 RX ADMIN — BETHANECHOL CHLORIDE 10 MG: 10 TABLET ORAL at 05:12

## 2017-12-20 RX ADMIN — SODIUM CHLORIDE: 0.9 INJECTION, SOLUTION INTRAVENOUS at 02:12

## 2017-12-20 RX ADMIN — VITAMIN C 1 TABLET: TAB at 09:12

## 2017-12-20 RX ADMIN — DONEPEZIL HYDROCHLORIDE 5 MG: 5 TABLET, FILM COATED ORAL at 09:12

## 2017-12-20 RX ADMIN — BACLOFEN 20 MG: 10 TABLET ORAL at 09:12

## 2017-12-20 RX ADMIN — LEVOTHYROXINE SODIUM 75 MCG: 75 TABLET ORAL at 09:12

## 2017-12-20 RX ADMIN — POTASSIUM & SODIUM PHOSPHATES POWDER PACK 280-160-250 MG 2 PACKET: 280-160-250 PACK at 05:12

## 2017-12-20 RX ADMIN — BACLOFEN 20 MG: 10 TABLET ORAL at 05:12

## 2017-12-20 RX ADMIN — PIPERACILLIN SODIUM AND TAZOBACTAM SODIUM 4.5 G: 4; .5 INJECTION, POWDER, LYOPHILIZED, FOR SOLUTION INTRAVENOUS at 05:12

## 2017-12-20 RX ADMIN — FINASTERIDE 5 MG: 5 TABLET, FILM COATED ORAL at 09:12

## 2017-12-20 RX ADMIN — PIPERACILLIN SODIUM AND TAZOBACTAM SODIUM 4.5 G: 4; .5 INJECTION, POWDER, LYOPHILIZED, FOR SOLUTION INTRAVENOUS at 09:12

## 2017-12-20 RX ADMIN — POTASSIUM & SODIUM PHOSPHATES POWDER PACK 280-160-250 MG 2 PACKET: 280-160-250 PACK at 09:12

## 2017-12-20 RX ADMIN — BACLOFEN 20 MG: 10 TABLET ORAL at 02:12

## 2017-12-20 RX ADMIN — PIPERACILLIN SODIUM AND TAZOBACTAM SODIUM 4.5 G: 4; .5 INJECTION, POWDER, LYOPHILIZED, FOR SOLUTION INTRAVENOUS at 12:12

## 2017-12-20 RX ADMIN — ENOXAPARIN SODIUM 40 MG: 100 INJECTION SUBCUTANEOUS at 05:12

## 2017-12-20 RX ADMIN — BETHANECHOL CHLORIDE 10 MG: 10 TABLET ORAL at 09:12

## 2017-12-20 NOTE — PLAN OF CARE
Problem: Patient Care Overview  Goal: Plan of Care Review  Outcome: Ongoing (interventions implemented as appropriate)  POC reviewed with Enrique at 0500. Enrique unable to verbalize understanding due to nonverbal and trach. Questions and concerns addressed. No acute events overnight. enrique progressing toward goals. Will continue to monitor. See flowsheets for full assessment and VS info

## 2017-12-20 NOTE — PROGRESS NOTES
[facility]  Neurocritical Care  Progress Note    Admit Date: 12/17/2017  Service Date: 12/20/2017  Length of Stay: 3    Subjective:     Chief Complaint: Seizure    History of Present Illness: Mr dickerson is a  is a 67 y.o. 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    who presents to [facility] for evaluation of new onset Seizure activity. He presented to outside ED with seizure activity for approximately 20 minutes. Per EMS, he was diaphoretic on arrival and tachycardic. Per nursing home, pt is nonverbal at baseline, but can focus on the speaker. He has a trach in place. He is being admitted to [facility] for a higher level of care.     Hospital Course: 12/17: admit to [facility], EEG pending, Keppra 1 G  at OSH and 500 Q 12 started, CTH: No acute process   12/18:increase keppra per epilepsy, sputum culture, trend procal  12/19: start RISS, discussed with daughter at bedside, not happy with Noa.  12/20: Pending to wean off the vent. Pending placement. Last EEG : L-PLEDS. The patient is very contracted. S/p trach placement.01/2017.    Interval History: >4 elements OR status of 3 inpatient conditions  Pending to wean off the vent. Pending placement. Last EEG : L-PLEDS. The patient is very contracted. S/p trach placement.01/2017.  Review of Systems   Unable to perform ROS: Intubated     2 systems OR Unable to obtain a complete ROS due to level of consciousness.  Objective:     Vitals:  Temp: 98.6 °F (37 °C) (12/20/17 1501)  Pulse: 83 (12/20/17 1501)  Resp: 20 (12/20/17 1501)  BP: 130/78 (12/20/17 1501)  SpO2: 100 % (12/20/17 1501)    Temp:  [98.5 °F (36.9 °C)-99.8 °F (37.7 °C)] 98.6 °F (37 °C)  Pulse:  [] 83  Resp:  [13-26] 20  SpO2:  [95 %-100 %] 100 %  BP: (107-150)/(68-99) 130/78         Oxygen Concentration (%):  [40-50] 40    12/19 0701 - 12/20 0700  In: 2990 [I.V.:1200]  Out: 2880 [Urine:2880]    Physical Exam  Unable to test  orientation, language, memory, judgment, insight, fund of knowledge, hearing, shoulder shrug, tongue protrusion, coordination, gait due to level of consciousness.  General   HEENT: s/ptrach.  Chest Heart RRR / Lungs Clear to auscultation  Abdomen: Soft nontender + BS; s/p PEG.  Extremities: OK distal pulses.  Skin:   Neurological Exam:  MS; Awake but not following commands.  CN: II-XII UK  Motor: LUE  2/5 / RUE 2/5   bilaterally bilateral contractures and spasticity             LLE   2/5 /  RLE 2/5   bilaterally bilateral contractures and spasticity  Sensory: LT/PP/T/ Vibration UK                 Complex sensory modalities: not tested  DTR:  normal throughout.  Coordination /Fine motor:Limited.  Gait: Not tested.  Meningeal signs: Absent  Medications:  Continuous  sodium chloride 0.9% Last Rate: 50 mL/hr at 12/20/17 1500   Scheduled  aspirin 81 mg Daily   atorvastatin 40 mg Daily   B-complex with vitamin C 1 tablet Daily   baclofen 20 mg TID   bethanechol 10 mg Q8H   chlorhexidine 15 mL BID   donepezil 5 mg Daily   enoxparin 40 mg Q24H   famotidine 20 mg BID   finasteride 5 mg Daily   FLUoxetine 20 mg Daily   levetiracetam oral soln 1,000 mg BID   levothyroxine 75 mcg Daily   mupirocin  BID   piperacillin-tazobactam 4.5 g in dextrose 5 % 100 mL IVPB (ready to mix system) 4.5 g Q8H   polyethylene glycol 17 g Daily   senna-docusate 8.6-50 mg 1 tablet Daily   tamsulosin 0.4 mg BID   PRN  acetaminophen 650 mg Q4H PRN   dextrose 50% 12.5 g PRN   glucagon (human recombinant) 1 mg PRN   insulin aspart 0-5 Units Q4H PRN   magnesium oxide 800 mg PRN   magnesium oxide 800 mg PRN   ondansetron 4 mg Q8H PRN   potassium chloride 10% 40 mEq PRN   potassium chloride 10% 40 mEq PRN   potassium chloride 10% 60 mEq PRN   potassium, sodium phosphates 2 packet PRN   potassium, sodium phosphates 2 packet PRN   potassium, sodium phosphates 2 packet PRN   sodium chloride 0.9% 3 mL PRN     Today I personally reviewed pertinent  medications, lines/drains/airways, imaging, cardiology, lab results, microbiology results, notably:    CMP:   Recent Labs  Lab 12/20/17  0258   CALCIUM 8.5*   ALBUMIN 2.4*   PROT 7.3   *   K 4.4   CO2 20*      BUN 26*   CREATININE 1.1   ALKPHOS 197*   ALT 95*   AST 33   BILITOT 0.5      BMP:   Recent Labs  Lab 12/20/17  0258   *   K 4.4      CO2 20*   BUN 26*   CREATININE 1.1   CALCIUM 8.5*      CBC:   Recent Labs  Lab 12/20/17  0258   WBC 8.63   RBC 2.87*   HGB 8.8*   HCT 29.1*   *   *   MCH 30.7   MCHC 30.2*      Lipid Panel: No results for input(s): CHOL, LDLCALC, HDL, TRIG in the last 168 hours.  Coagulation:   Recent Labs  Lab 12/17/17  0400 12/17/17  0500   INR 1.1 1.0   APTT 21.7  --           Assessment/Plan:     Neuro   * Seizure    - epilepsy following  -keppra  1g BID        Toxic encephalopathy    - secondary to pneumonia, uti, dehydration        Aphasia    - Hx  prior Stroke        Pulmonary   Pneumonia due to infectious organism    POA  Continue vanc and zosyn  Chest PT  Suctioning          Cardiac/Vascular   Essential hypertension    - SBP < 180   - Obtain home medication list   - PRN Labetalol         Renal/   Acute cystitis without hematuria    POA  Continue vanc and zosyn  Condom cath        CKD (chronic kidney disease) stage 3, GFR 30-59 ml/min    - Monitor labs   Monitor I/Os        GI   Dysphagia    - NPO  -  meds per peg   -tube feeds            Active Problem List:   1.New Onset SZ, status resolved.   2.Dermatomnyositis  3. HTN  4. Hx of L ICA stenosis and stroke  5. Pneumonia and UTI.  6. Spasticity and contractures on all 4 limbs.  7. Ventilatory failure s/p trach,     Assessment / Plan:     Neuro:  -Keppra 1gr bid  -ECASA 81 mg qd  -Lipitor 40mg qd  -Tylenol PRN  -PT/OT  Multipodous  boots and splints  -Baclofen 20mg tid  -Rahb consult for botox.  Resp:  -Trach collar.  CV: Keep SBP <=160 mmHg.  No antihypertensives.  IVF/nutrition/Renal/GI:  -D/C  IVF  -TF at goal.  -NS flushes 100cc q 6hrs  -Bladder scan  q 6hrs x 24hrs  -NS at 50cc /hr x 24 hrs more.  -Serum Na q 6hrs  -BR  Hem / ID: UTI and pneumonia in TX ESBL, Proteuns and providence and ESBL in urine. WBC normalized,  -Vanc D/C  -Zosyn x 7 days,  Endo:  -SSI q 6hrs  -Lipitor 40mg qd  -Levothyroxine 0.075mg qd  Prophylaxis:  -Enoxiparin SC 40mg qd  -Famotidine.  Advance Directives and Disposition:    Full Code. Pending LTAC placement and keep in isolation.           Uninterrupted level 3  Follow-up /Counseling Time (not including procedures):  = 35 min            Activity Orders          Ambulate with assistance starting at 12/17 0447        Prior    Caden Correia MD  Neurocritical Care  [facility]

## 2017-12-20 NOTE — SUBJECTIVE & OBJECTIVE
Interval History: >4 elements OR status of 3 inpatient conditions  Pending to wean off the vent. Pending placement. Last EEG : L-PLEDS. The patient is very contracted. S/p trach placement.01/2017.  Review of Systems   Unable to perform ROS: Intubated     2 systems OR Unable to obtain a complete ROS due to level of consciousness.  Objective:     Vitals:  Temp: 98.6 °F (37 °C) (12/20/17 1501)  Pulse: 83 (12/20/17 1501)  Resp: 20 (12/20/17 1501)  BP: 130/78 (12/20/17 1501)  SpO2: 100 % (12/20/17 1501)    Temp:  [98.5 °F (36.9 °C)-99.8 °F (37.7 °C)] 98.6 °F (37 °C)  Pulse:  [] 83  Resp:  [13-26] 20  SpO2:  [95 %-100 %] 100 %  BP: (107-150)/(68-99) 130/78         Oxygen Concentration (%):  [40-50] 40    12/19 0701 - 12/20 0700  In: 2990 [I.V.:1200]  Out: 2880 [Urine:2880]    Physical Exam  Unable to test orientation, language, memory, judgment, insight, fund of knowledge, hearing, shoulder shrug, tongue protrusion, coordination, gait due to level of consciousness.  General   HEENT: s/ptrach.  Chest Heart RRR / Lungs Clear to auscultation  Abdomen: Soft nontender + BS; s/p PEG.  Extremities: OK distal pulses.  Skin: UK  Neurological Exam:  MS; Awake but not following commands.  CN: II-XII UK  Motor: LUE  2/5 / RUE 2/5   bilaterally bilateral contractures and spasticity             LLE   2/5 /  RLE 2/5   bilaterally bilateral contractures and spasticity  Sensory: LT/PP/T/ Vibration UK                 Complex sensory modalities: not tested  DTR:  normal throughout.  Coordination /Fine motor:Limited.  Gait: Not tested.  Meningeal signs: Absent  Medications:  Continuous  sodium chloride 0.9% Last Rate: 50 mL/hr at 12/20/17 1500   Scheduled  aspirin 81 mg Daily   atorvastatin 40 mg Daily   B-complex with vitamin C 1 tablet Daily   baclofen 20 mg TID   bethanechol 10 mg Q8H   chlorhexidine 15 mL BID   donepezil 5 mg Daily   enoxparin 40 mg Q24H   famotidine 20 mg BID   finasteride 5 mg Daily   FLUoxetine 20 mg Daily    levetiracetam oral soln 1,000 mg BID   levothyroxine 75 mcg Daily   mupirocin  BID   piperacillin-tazobactam 4.5 g in dextrose 5 % 100 mL IVPB (ready to mix system) 4.5 g Q8H   polyethylene glycol 17 g Daily   senna-docusate 8.6-50 mg 1 tablet Daily   tamsulosin 0.4 mg BID   PRN  acetaminophen 650 mg Q4H PRN   dextrose 50% 12.5 g PRN   glucagon (human recombinant) 1 mg PRN   insulin aspart 0-5 Units Q4H PRN   magnesium oxide 800 mg PRN   magnesium oxide 800 mg PRN   ondansetron 4 mg Q8H PRN   potassium chloride 10% 40 mEq PRN   potassium chloride 10% 40 mEq PRN   potassium chloride 10% 60 mEq PRN   potassium, sodium phosphates 2 packet PRN   potassium, sodium phosphates 2 packet PRN   potassium, sodium phosphates 2 packet PRN   sodium chloride 0.9% 3 mL PRN     Today I personally reviewed pertinent medications, lines/drains/airways, imaging, cardiology, lab results, microbiology results, notably:    CMP:   Recent Labs  Lab 12/20/17  0258   CALCIUM 8.5*   ALBUMIN 2.4*   PROT 7.3   *   K 4.4   CO2 20*      BUN 26*   CREATININE 1.1   ALKPHOS 197*   ALT 95*   AST 33   BILITOT 0.5      BMP:   Recent Labs  Lab 12/20/17  0258   *   K 4.4      CO2 20*   BUN 26*   CREATININE 1.1   CALCIUM 8.5*      CBC:   Recent Labs  Lab 12/20/17  0258   WBC 8.63   RBC 2.87*   HGB 8.8*   HCT 29.1*   *   *   MCH 30.7   MCHC 30.2*      Lipid Panel: No results for input(s): CHOL, LDLCALC, HDL, TRIG in the last 168 hours.  Coagulation:   Recent Labs  Lab 12/17/17  0400 12/17/17  0500   INR 1.1 1.0   APTT 21.7  --

## 2017-12-20 NOTE — PROCEDURES
DATE OF STUDY:  12/17/2017.    ICU EEG/VIDEO MONITORING REPORT    METHODOLOGY:  Electroencephalographic (EEG) is recorded with electrodes placed   according to the International 10-20 placement system.  Thirty two (32) channels   of digital signal (sampling rate of 512/sec), including T1 and T2, were   simultaneously recorded from the scalp and may include EKG, EMG, and/or eye   monitors.  Recording band pass was 0.1 to 512 Hz.  Digital video recording of   the patient is simultaneously recorded with the EEG.  The patient is instructed   to report clinical symptoms which may occur during the recording session.  EEG   and video recording are stored and archived in digital format.  Activation   procedures, which include photic stimulation, hyperventilation and instructing   patients to perform simple tasks, are done in selected patients  The EEG is displayed on a monitor screen and can be reviewed using different   montages.  Computer-assisted analysis is employed to detect spike and   electrographic seizure activity.   The entire record is submitted for computer   analysis.  The entire recording is visually reviewed, and the times identified   by computer analysis as being spikes or seizures are reviewed again.    Compressed spectral analysis (CSA) is also performed on the activity recorded   from each individual channel.  This is displayed as a power display of   frequencies from 0 to 30 Hz over time.   The CSA is reviewed looking for   asymmetries in power between homologous areas of the scalp, then compared with   the original EEG recording.    [employer] software was also utilized in the review of this study.  This software   suite analyzes the EEG recording in multiple domains.  Coherence and rhythmicity   are computed to identify EEG sections which may contain organized seizures.    Each channel undergoes analysis to detect the presence of spike and sharp waves   which have special and morphological  characteristics of epileptic activity.  The   routine EEG recording is converted from special into frequency domain.  This is   then displayed comparing homologous areas to identify areas of significant   asymmetry.  Algorithm to identify non-cortically generated artifact is used to   separate artifact from the EEG.      RECORDING TIMES:  Start on 12/17/2017 at 12:05  End on 12/19/2017 at 07:00  The total duration of the study is 39 hours and 10 minutes.    EEG FINDINGS:  The background of this record contains mixed frequency activity   with low amplitude delta activity with abundant faster frequencies superimposed   including theta and alpha band activity.  There is also significant amount of   movement and muscle artifact through large portions of this record that do cloud   interpretation.    Frequently seen are intermittent and periodic lateralized epileptiform   discharges in the left hemisphere during this record.  At times, these appeared   to be sporadic and as the record progresses are seen in a more periodic fashion   with a frequency ranging from one per second to one per five seconds and a clear   laterality with a wide distribution in the left hemisphere.  These PLEDs are   clearly periodic and pseudoperiodic and did not exhibit much rhythmicity.  There   is no captured electrographic seizure that can be ascertained, although the   PLEDs are frequent enough to indicate significant cortical irritability and the   potential for seizures.    The overall background contains a good variability throughout with mixed   frequencies seen and some hemispheric asymmetry with better mixed frequency   activity seen in the right hemisphere and more suppressed overall background   with the PLEDs in the left hemisphere.    INTERPRETATION:  Abnormal EEG due to the presence of periodic lateralized   epileptiform discharges in the left hemisphere seen throughout this record   indicating cortical irritability and the  potential for seizures in this region.    Electrographic seizure activity was not seen, however.      NBB/HN  dd: 12/19/2017 17:12:22 (CST)  td: 12/20/2017 03:28:16 (CST)  Doc ID   #[id number]  Job ID #[id number]    CC:

## 2017-12-21 PROBLEM — R25.2 SPASTICITY: Status: ACTIVE | Noted: 2017-12-21

## 2017-12-21 LAB
ALBUMIN SERPL BCP-MCNC: 2.2 G/DL
ALLENS TEST: ABNORMAL
ALLENS TEST: ABNORMAL
ALP SERPL-CCNC: 185 U/L
ALT SERPL W/O P-5'-P-CCNC: 77 U/L
ANION GAP SERPL CALC-SCNC: 9 MMOL/L
AST SERPL-CCNC: 26 U/L
BACTERIA SPEC AEROBE CULT: NORMAL
BACTERIA SPEC AEROBE CULT: NORMAL
BASOPHILS # BLD AUTO: 0 K/UL
BASOPHILS NFR BLD: 0 %
BILIRUB SERPL-MCNC: 0.5 MG/DL
BUN SERPL-MCNC: 26 MG/DL
CALCIUM SERPL-MCNC: 8.9 MG/DL
CHLORIDE SERPL-SCNC: 111 MMOL/L
CO2 SERPL-SCNC: 21 MMOL/L
CREAT SERPL-MCNC: 1 MG/DL
DELSYS: ABNORMAL
DELSYS: ABNORMAL
DIFFERENTIAL METHOD: ABNORMAL
EOSINOPHIL # BLD AUTO: 0.7 K/UL
EOSINOPHIL NFR BLD: 7.8 %
ERYTHROCYTE [DISTWIDTH] IN BLOOD BY AUTOMATED COUNT: 16.2 %
ERYTHROCYTE [SEDIMENTATION RATE] IN BLOOD BY WESTERGREN METHOD: 10 MM/H
ERYTHROCYTE [SEDIMENTATION RATE] IN BLOOD BY WESTERGREN METHOD: 16 MM/H
EST. GFR  (AFRICAN AMERICAN): >60 ML/MIN/1.73 M^2
EST. GFR  (NON AFRICAN AMERICAN): >60 ML/MIN/1.73 M^2
FIO2: 40
FIO2: 40
FLOW: 10
FLOW: 10
GLUCOSE SERPL-MCNC: 101 MG/DL
GRAM STN SPEC: NORMAL
HCO3 UR-SCNC: 20.9 MMOL/L (ref 24–28)
HCO3 UR-SCNC: 23.7 MMOL/L (ref 24–28)
HCT VFR BLD AUTO: 30.6 %
HGB BLD-MCNC: 9.9 G/DL
IMM GRANULOCYTES # BLD AUTO: 0.05 K/UL
IMM GRANULOCYTES NFR BLD AUTO: 0.6 %
LYMPHOCYTES # BLD AUTO: 0.7 K/UL
LYMPHOCYTES NFR BLD: 7.9 %
MAGNESIUM SERPL-MCNC: 1.6 MG/DL
MAGNESIUM SERPL-MCNC: 1.9 MG/DL
MCH RBC QN AUTO: 30.4 PG
MCHC RBC AUTO-ENTMCNC: 32.4 G/DL
MCV RBC AUTO: 94 FL
MODE: ABNORMAL
MODE: ABNORMAL
MONOCYTES # BLD AUTO: 0.6 K/UL
MONOCYTES NFR BLD: 6.9 %
NEUTROPHILS # BLD AUTO: 6.4 K/UL
NEUTROPHILS NFR BLD: 76.8 %
NRBC BLD-RTO: 0 /100 WBC
PCO2 BLDA: 31.9 MMHG (ref 35–45)
PCO2 BLDA: 33.4 MMHG (ref 35–45)
PH SMN: 7.42 [PH] (ref 7.35–7.45)
PH SMN: 7.46 [PH] (ref 7.35–7.45)
PHOSPHATE SERPL-MCNC: 2.9 MG/DL
PLATELET # BLD AUTO: 160 K/UL
PMV BLD AUTO: 12.1 FL
PO2 BLDA: 100 MMHG (ref 80–100)
PO2 BLDA: 47 MMHG (ref 80–100)
POC BE: -4 MMOL/L
POC BE: 0 MMOL/L
POC SATURATED O2: 85 % (ref 95–100)
POC SATURATED O2: 98 % (ref 95–100)
POC TCO2: 22 MMOL/L (ref 23–27)
POC TCO2: 25 MMOL/L (ref 23–27)
POCT GLUCOSE: 109 MG/DL (ref 70–110)
POCT GLUCOSE: 116 MG/DL (ref 70–110)
POCT GLUCOSE: 122 MG/DL (ref 70–110)
POCT GLUCOSE: 128 MG/DL (ref 70–110)
POCT GLUCOSE: 153 MG/DL (ref 70–110)
POCT GLUCOSE: 157 MG/DL (ref 70–110)
POTASSIUM SERPL-SCNC: 3.8 MMOL/L
POTASSIUM SERPL-SCNC: 4.3 MMOL/L
PROT SERPL-MCNC: 7.3 G/DL
RBC # BLD AUTO: 3.26 M/UL
SAMPLE: ABNORMAL
SAMPLE: ABNORMAL
SITE: ABNORMAL
SITE: ABNORMAL
SODIUM SERPL-SCNC: 141 MMOL/L
SP02: 100
SP02: 96
WBC # BLD AUTO: 8.37 K/UL

## 2017-12-21 PROCEDURE — 25000003 PHARM REV CODE 250: Performed by: NURSE PRACTITIONER

## 2017-12-21 PROCEDURE — 97803 MED NUTRITION INDIV SUBSEQ: CPT

## 2017-12-21 PROCEDURE — 85025 COMPLETE CBC W/AUTO DIFF WBC: CPT

## 2017-12-21 PROCEDURE — 99222 1ST HOSP IP/OBS MODERATE 55: CPT | Mod: ,,, | Performed by: NURSE PRACTITIONER

## 2017-12-21 PROCEDURE — 25000003 PHARM REV CODE 250: Performed by: PHYSICIAN ASSISTANT

## 2017-12-21 PROCEDURE — 83735 ASSAY OF MAGNESIUM: CPT

## 2017-12-21 PROCEDURE — 36600 WITHDRAWAL OF ARTERIAL BLOOD: CPT

## 2017-12-21 PROCEDURE — 63600175 PHARM REV CODE 636 W HCPCS: Performed by: PHYSICIAN ASSISTANT

## 2017-12-21 PROCEDURE — 25000003 PHARM REV CODE 250: Performed by: PSYCHIATRY & NEUROLOGY

## 2017-12-21 PROCEDURE — 84295 ASSAY OF SERUM SODIUM: CPT | Mod: 91

## 2017-12-21 PROCEDURE — 80053 COMPREHEN METABOLIC PANEL: CPT

## 2017-12-21 PROCEDURE — 20000000 HC ICU ROOM

## 2017-12-21 PROCEDURE — 82803 BLOOD GASES ANY COMBINATION: CPT

## 2017-12-21 PROCEDURE — 97161 PT EVAL LOW COMPLEX 20 MIN: CPT

## 2017-12-21 PROCEDURE — 83735 ASSAY OF MAGNESIUM: CPT | Mod: 91

## 2017-12-21 PROCEDURE — 99233 SBSQ HOSP IP/OBS HIGH 50: CPT | Mod: GC,,, | Performed by: PSYCHIATRY & NEUROLOGY

## 2017-12-21 PROCEDURE — 84132 ASSAY OF SERUM POTASSIUM: CPT

## 2017-12-21 PROCEDURE — 84100 ASSAY OF PHOSPHORUS: CPT

## 2017-12-21 RX ORDER — SODIUM CHLORIDE 9 MG/ML
INJECTION, SOLUTION INTRAVENOUS CONTINUOUS
Status: DISCONTINUED | OUTPATIENT
Start: 2017-12-21 | End: 2017-12-22

## 2017-12-21 RX ADMIN — CHLORHEXIDINE GLUCONATE 15 ML: 1.2 RINSE ORAL at 08:12

## 2017-12-21 RX ADMIN — LEVOTHYROXINE SODIUM 75 MCG: 75 TABLET ORAL at 08:12

## 2017-12-21 RX ADMIN — FLUOXETINE 20 MG: 20 CAPSULE ORAL at 08:12

## 2017-12-21 RX ADMIN — BACLOFEN 20 MG: 10 TABLET ORAL at 05:12

## 2017-12-21 RX ADMIN — BACLOFEN 20 MG: 10 TABLET ORAL at 09:12

## 2017-12-21 RX ADMIN — FINASTERIDE 5 MG: 5 TABLET, FILM COATED ORAL at 08:12

## 2017-12-21 RX ADMIN — VITAMIN C 1 TABLET: TAB at 08:12

## 2017-12-21 RX ADMIN — ATORVASTATIN CALCIUM 40 MG: 20 TABLET, FILM COATED ORAL at 08:12

## 2017-12-21 RX ADMIN — PIPERACILLIN SODIUM AND TAZOBACTAM SODIUM 4.5 G: 4; .5 INJECTION, POWDER, LYOPHILIZED, FOR SOLUTION INTRAVENOUS at 09:12

## 2017-12-21 RX ADMIN — PIPERACILLIN SODIUM AND TAZOBACTAM SODIUM 4.5 G: 4; .5 INJECTION, POWDER, LYOPHILIZED, FOR SOLUTION INTRAVENOUS at 06:12

## 2017-12-21 RX ADMIN — MUPIROCIN: 20 OINTMENT TOPICAL at 08:12

## 2017-12-21 RX ADMIN — ACETAMINOPHEN 650 MG: 650 SOLUTION ORAL at 07:12

## 2017-12-21 RX ADMIN — FAMOTIDINE 20 MG: 20 TABLET, FILM COATED ORAL at 09:12

## 2017-12-21 RX ADMIN — SODIUM CHLORIDE: 9 INJECTION, SOLUTION INTRAVENOUS at 09:12

## 2017-12-21 RX ADMIN — FAMOTIDINE 20 MG: 20 TABLET, FILM COATED ORAL at 08:12

## 2017-12-21 RX ADMIN — BETHANECHOL CHLORIDE 10 MG: 10 TABLET ORAL at 09:12

## 2017-12-21 RX ADMIN — BETHANECHOL CHLORIDE 10 MG: 10 TABLET ORAL at 03:12

## 2017-12-21 RX ADMIN — DONEPEZIL HYDROCHLORIDE 5 MG: 5 TABLET, FILM COATED ORAL at 08:12

## 2017-12-21 RX ADMIN — TAMSULOSIN HYDROCHLORIDE 0.4 MG: 0.4 CAPSULE ORAL at 09:12

## 2017-12-21 RX ADMIN — MUPIROCIN: 20 OINTMENT TOPICAL at 09:12

## 2017-12-21 RX ADMIN — BACLOFEN 20 MG: 10 TABLET ORAL at 03:12

## 2017-12-21 RX ADMIN — ASPIRIN 81 MG CHEWABLE TABLET 81 MG: 81 TABLET CHEWABLE at 08:12

## 2017-12-21 RX ADMIN — BETHANECHOL CHLORIDE 10 MG: 10 TABLET ORAL at 05:12

## 2017-12-21 RX ADMIN — TAMSULOSIN HYDROCHLORIDE 0.4 MG: 0.4 CAPSULE ORAL at 08:12

## 2017-12-21 RX ADMIN — CHLORHEXIDINE GLUCONATE 15 ML: 1.2 RINSE ORAL at 09:12

## 2017-12-21 RX ADMIN — LEVETIRACETAM 1000 MG: 100 SOLUTION ORAL at 09:12

## 2017-12-21 RX ADMIN — PIPERACILLIN SODIUM AND TAZOBACTAM SODIUM 4.5 G: 4; .5 INJECTION, POWDER, LYOPHILIZED, FOR SOLUTION INTRAVENOUS at 01:12

## 2017-12-21 RX ADMIN — MAGNESIUM OXIDE TAB 400 MG (241.3 MG ELEMENTAL MG) 800 MG: 400 (241.3 MG) TAB at 07:12

## 2017-12-21 RX ADMIN — POTASSIUM CHLORIDE 40 MEQ: 20 SOLUTION ORAL at 07:12

## 2017-12-21 RX ADMIN — LEVETIRACETAM 1000 MG: 100 SOLUTION ORAL at 08:12

## 2017-12-21 RX ADMIN — ENOXAPARIN SODIUM 40 MG: 100 INJECTION SUBCUTANEOUS at 04:12

## 2017-12-21 NOTE — SUBJECTIVE & OBJECTIVE
Interval History:  >4 elements OR status of 3 inpatient conditions  Pending to wean off the vent. And placement. Last EEG : L-PLEDS.  Yesterday Vanc was D/C and we left him on Zosyn. He was placed on isolation for E.Coli ESBL. S/P trach / PEG. Rehab consult to assess for Botox  injections for spasticity. Tolerating trach collar.     Review of Systems   Unable to perform ROS: Patient nonverbal     2 systems OR Unable to obtain a complete ROS due to level of consciousness.  Objective:     Vitals:  Temp: 97.5 °F (36.4 °C) (12/21/17 1200)  Pulse: (!) 58 (12/21/17 1200)  Resp: 11 (12/21/17 1200)  BP: 110/65 (12/21/17 1200)  SpO2: 97 % (12/21/17 1200)    Temp:  [97.5 °F (36.4 °C)-100.6 °F (38.1 °C)] 97.5 °F (36.4 °C)  Pulse:  [58-97] 58  Resp:  [11-27] 11  SpO2:  [94 %-100 %] 97 %  BP: ()/(54-93) 110/65         Oxygen Concentration (%):  [40] 40    12/20 0701 - 12/21 0700  In: 2001.7 [I.V.:1061.7]  Out: 1800 [Urine:1800]    Physical Exam  Unable to test orientation, language, memory, judgment, insight, fund of knowledge, hearing, shoulder shrug, tongue protrusion, coordination, gait due to level of consciousness.  General   HEENT: s/ptrach.  Chest Heart RRR / Lungs Clear to auscultation  Abdomen: Soft nontender + BS; s/p PEG.  Extremities: OK distal pulses.  Skin: UK  Neurological Exam:  MS; Awake but not following commands.  CN: II-XII UK  Motor: LUE  1-2/5 / RUE 1-2/5   bilaterally bilateral contractures and spasticity             LLE   1-2/5 /  RLE1- 2/5   bilaterally bilateral contractures and spasticity  Sensory: LT/PP/T/ Vibration UK                 Complex sensory modalities: not tested  DTR:  normal throughout.  Coordination /Fine motor:Limited.  Gait: Not tested.  Meningeal signs: Absent     Medications:  Continuous  sodium chloride 0.9% Last Rate: Stopped (12/21/17 0957)   sodium chloride 0.9% Last Rate: 500 mL/hr at 12/21/17 0957   Scheduled  aspirin 81 mg Daily   atorvastatin 40 mg Daily   B-complex with  vitamin C 1 tablet Daily   baclofen 20 mg TID   bethanechol 10 mg Q8H   chlorhexidine 15 mL BID   donepezil 5 mg Daily   enoxparin 40 mg Q24H   famotidine 20 mg BID   finasteride 5 mg Daily   FLUoxetine 20 mg Daily   levetiracetam oral soln 1,000 mg BID   levothyroxine 75 mcg Daily   mupirocin  BID   piperacillin-tazobactam 4.5 g in dextrose 5 % 100 mL IVPB (ready to mix system) 4.5 g Q8H   polyethylene glycol 17 g Daily   senna-docusate 8.6-50 mg 1 tablet Daily   tamsulosin 0.4 mg BID   PRN  acetaminophen 650 mg Q4H PRN   dextrose 50% 12.5 g PRN   glucagon (human recombinant) 1 mg PRN   insulin aspart 0-5 Units Q4H PRN   magnesium oxide 800 mg PRN   magnesium oxide 800 mg PRN   ondansetron 4 mg Q8H PRN   potassium chloride 10% 40 mEq PRN   potassium chloride 10% 40 mEq PRN   potassium chloride 10% 60 mEq PRN   potassium, sodium phosphates 2 packet PRN   potassium, sodium phosphates 2 packet PRN   potassium, sodium phosphates 2 packet PRN   sodium chloride 0.9% 3 mL PRN     Today I personally reviewed pertinent medications, lines/drains/airways, imaging, cardiology, lab results, microbiology results, notably:  CMP:      Recent Labs  Lab 12/21/17 0413 12/21/17  0808   CALCIUM 8.9  --    ALBUMIN 2.2*  --    PROT 7.3  --     141   K 3.8  --    CO2 21*  --    *  --    BUN 26*  --    CREATININE 1.0  --    ALKPHOS 185*  --    ALT 77*  --    AST 26  --    BILITOT 0.5  --       BMP:      Recent Labs  Lab 12/21/17 0413 12/21/17  0808    141   K 3.8  --    *  --    CO2 21*  --    BUN 26*  --    CREATININE 1.0  --    CALCIUM 8.9  --       CBC:      Recent Labs  Lab 12/21/17 0413   WBC 8.37   RBC 3.26*   HGB 9.9*   HCT 30.6*      MCV 94   MCH 30.4   MCHC 32.4      Lipid Panel: No results for input(s): CHOL, LDLCALC, HDL, TRIG in the last 168 hours.  Coagulation:      Recent Labs  Lab 12/17/17  0400 12/17/17  0500   INR 1.1 1.0   APTT 21.7  --       Platelet Aggregation Study: No results for  input(s): PLTAGG, PLTAGINTERP, PLTAGREGLACO, ADPPLTAGGREG in the last 168 hours.  Hgb A1C: No results for input(s): HGBA1C in the last 168 hours.  TSH: No results for input(s): TSH in the last 168 hours.

## 2017-12-21 NOTE — PLAN OF CARE
Elvia phoned Anitha @ [phone] spoke to Estefany said some of it is coming thru now told her if she needed anything else please contact the  Annie Payne/elvia

## 2017-12-21 NOTE — SUBJECTIVE & OBJECTIVE
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     Stroke      Past Surgical History:   Procedure Laterality Date    KNEE SURGERY        in high school     Review of patient's allergies indicates:   Allergen Reactions    Diltiazem hcl     Metoprolol tartrate     Sulfa (sulfonamide antibiotics)        Scheduled Medications:    aspirin  81 mg Per G Tube Daily    atorvastatin  40 mg Per G Tube Daily    B-complex with vitamin C  1 tablet Per G Tube Daily    baclofen  20 mg Per G Tube TID    bethanechol  10 mg Per G Tube Q8H    chlorhexidine  15 mL Mouth/Throat BID    donepezil  5 mg Per G Tube Daily    enoxparin  40 mg Subcutaneous Q24H    famotidine  20 mg Per G Tube BID    finasteride  5 mg Per G Tube Daily    FLUoxetine  20 mg Per G Tube Daily    levetiracetam oral soln  1,000 mg Per G Tube BID    levothyroxine  75 mcg Per G Tube Daily    mupirocin   Topical (Top) BID    piperacillin-tazobactam 4.5 g in dextrose 5 % 100 mL IVPB (ready to mix system)  4.5 g Intravenous Q8H    polyethylene glycol  17 g Per G Tube Daily    senna-docusate 8.6-50 mg  1 tablet Per G Tube Daily    tamsulosin  0.4 mg Per G Tube BID       PRN Medications: acetaminophen, dextrose 50%, glucagon (human recombinant), insulin aspart, magnesium oxide, magnesium oxide, ondansetron, potassium chloride 10%, potassium chloride 10%, potassium chloride 10%, potassium, sodium phosphates, potassium, sodium phosphates, potassium, sodium phosphates, sodium chloride 0.9%    Family History     Problem Relation (Age of Onset)    Cancer Father (60)    Colon cancer Father    Rheum arthritis Mother        Social History Main Topics    Smoking status: Former Smoker     Types: Cigars    Smokeless tobacco: Never Used      Comment: occasional cigar    Alcohol use No    Drug use: No    Sexual activity:  Not on file     Review of Systems   Reason unable to perform ROS: non-verbal.     Objective:     Vital Signs (Most Recent):  Temp: 97.5 °F (36.4 °C) (12/21/17 1200)  Pulse: (!) 58 (12/21/17 1200)  Resp: 11 (12/21/17 1200)  BP: 110/65 (12/21/17 1200)  SpO2: 97 % (12/21/17 1200)    Vital Signs (24h Range):  Temp:  [97.5 °F (36.4 °C)-100.6 °F (38.1 °C)] 97.5 °F (36.4 °C)  Pulse:  [58-97] 58  Resp:  [11-27] 11  SpO2:  [94 %-100 %] 97 %  BP: ()/(54-93) 110/65     Body mass index is 19.96 kg/m².    Physical Exam   Constitutional: He appears cachectic. He appears ill.   HENT:   Head: Normocephalic and atraumatic.   Eyes: EOM are normal. Pupils are equal, round, and reactive to light.   Neck: Neck supple.   Cardiovascular: Normal rate and regular rhythm.    Pulmonary/Chest: Effort normal. No respiratory distress.   Trach noted    Abdominal: Soft.   PEG placed   Musculoskeletal:   B/l UE & LE contracted   Neurological:   Somnolent but does open eye to voice and stimuli.  Speech: non-verbal.  Does not follow commands.       Skin: Skin is warm and dry.   Psychiatric: Cognition and memory are impaired.     NEUROLOGICAL EXAMINATION:     CRANIAL NERVES     CN III, IV, VI   Pupils are equal, round, and reactive to light.  Extraocular motions are normal.       Diagnostic Results:   Labs: Reviewed  ECG: Reviewed  X-Ray: Reviewed  US: Reviewed  CT: Reviewed

## 2017-12-21 NOTE — CONSULTS
[facility]  Physical Medicine & Rehab  Consult Note    Patient Name: Enrique Yancey  MRN: [id number]  Admission Date: 12/17/2017  Hospital Length of Stay: 4 days  Attending Physician: Smooth Hernandez MD     Inpatient consult to Physical Medicine & Rehabilitation  Consult performed by: Irma Sharpe NP  Consult requested by:  Smooth Hernandez MD    Reason for Consult:  assess spasticity    Consults  Subjective:     Principal Problem: Seizure    HPI: Enrique Yancey is a 67-year-old male with PMHx of HTN,  stenosis of left internal carotid artery with cerebral infarction (11/2016 with residual R weakness), seizure, CAD s/p MI, peg placed, trach (placed 01/2017).  Patient presented to [facility] on 12/17 with seizure activity.  CTH revealed no acute pathology with chronic left MCA territory and bilateral thalamic infarcts.  EEG on 12/17 revealed periodic lateralized epileptiform discharges in the left hemisphere indicating cortical irritability and the potential for seizures in this region; however, no seizure activity was seen (currently receiving Keppra).  Hospital course was further complicated by E.coli ESBL UTI (urine cx + on 12/16-on Zosyn).  Currently on trach collar with contractures in all 4 extremities.      Functional History: Per chart review, [facility] [place] [facility] [place]. Prior to admission, he is non-verbal on baseline but can focus on the speaker.        Hospital Course: 12/21/17: Therapy evaluation pending.    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     Stroke      Past Surgical History:   Procedure Laterality Date    KNEE SURGERY        in high school     Review of patient's allergies indicates:   Allergen Reactions    Diltiazem hcl     Metoprolol tartrate     Sulfa (sulfonamide antibiotics)        Scheduled Medications:     aspirin  81 mg Per G Tube Daily    atorvastatin  40 mg Per G Tube Daily    B-complex with vitamin C  1 tablet Per G Tube Daily    baclofen  20 mg Per G Tube TID    bethanechol  10 mg Per G Tube Q8H    chlorhexidine  15 mL Mouth/Throat BID    donepezil  5 mg Per G Tube Daily    enoxparin  40 mg Subcutaneous Q24H    famotidine  20 mg Per G Tube BID    finasteride  5 mg Per G Tube Daily    FLUoxetine  20 mg Per G Tube Daily    levetiracetam oral soln  1,000 mg Per G Tube BID    levothyroxine  75 mcg Per G Tube Daily    mupirocin   Topical (Top) BID    piperacillin-tazobactam 4.5 g in dextrose 5 % 100 mL IVPB (ready to mix system)  4.5 g Intravenous Q8H    polyethylene glycol  17 g Per G Tube Daily    senna-docusate 8.6-50 mg  1 tablet Per G Tube Daily    tamsulosin  0.4 mg Per G Tube BID       PRN Medications: acetaminophen, dextrose 50%, glucagon (human recombinant), insulin aspart, magnesium oxide, magnesium oxide, ondansetron, potassium chloride 10%, potassium chloride 10%, potassium chloride 10%, potassium, sodium phosphates, potassium, sodium phosphates, potassium, sodium phosphates, sodium chloride 0.9%    Family History     Problem Relation (Age of Onset)    Cancer Father (60)    Colon cancer Father    Rheum arthritis Mother        Social History Main Topics    Smoking status: Former Smoker     Types: Cigars    Smokeless tobacco: Never Used      Comment: occasional cigar    Alcohol use No    Drug use: No    Sexual activity: Not on file     Review of Systems   Reason unable to perform ROS: non-verbal.     Objective:     Vital Signs (Most Recent):  Temp: 97.5 °F (36.4 °C) (12/21/17 1200)  Pulse: (!) 58 (12/21/17 1200)  Resp: 11 (12/21/17 1200)  BP: 110/65 (12/21/17 1200)  SpO2: 97 % (12/21/17 1200)    Vital Signs (24h Range):  Temp:  [97.5 °F (36.4 °C)-100.6 °F (38.1 °C)] 97.5 °F (36.4 °C)  Pulse:  [58-97] 58  Resp:  [11-27] 11  SpO2:  [94 %-100 %] 97 %  BP: ()/(54-93) 110/65     Body  mass index is 19.96 kg/m².    Physical Exam   Constitutional: He appears cachectic. He appears ill.   HENT:   Head: Normocephalic and atraumatic.   Eyes: EOM are normal. Pupils are equal, round, and reactive to light.   Neck: Neck supple.   Cardiovascular: Normal rate and regular rhythm.    Pulmonary/Chest: Effort normal. No respiratory distress.   Trach with vent  Abdominal: Soft.   PEG placed   Musculoskeletal:   B/l UE & LE contracted   Neurological:   Somnolent but does open eye to voice and stimuli.  Speech: non-verbal.  Does not follow commands.    Skin: Skin is warm and dry.   Psychiatric: Cognition and memory are impaired.         Diagnostic Results:   Labs: Reviewed  ECG: Reviewed  X-Ray: Reviewed  US: Reviewed  CT: Reviewed    Assessment/Plan:     * Seizure    -h/o seizure from admission in 11/2016  -EEG on 12.17 revealed         Spasticity    -b/l UE & LE contractures noted  -per MAR, patient is currently on Baclofen 20mg per PEG  -Botox injections only performed in outpatient clinic  -PT/OT eval and treat   -will f/u with PM&R physician for further recs        Pneumonia due to infectious organism    -CXR revealed possible pneumonia  -BCX NGTD  -respiratory culture revealed PROTEUS MIRABILIS  -on Vanc and Zosyn        Acute cystitis without hematuria    -urine culture revealed E.coli on 12/16  -on Zosyn IV          Toxic encephalopathy    Recommendations  -  Monitor sleep disturbances and establish consistent sleep-wake cycle  ·  Day time- lights on and shades open, night time- lights dim/off  -  Environmental modifications to limit agitation/confusion   · Appropriate lighting, family at bedside, visible clock and calendar, updated white board, reduce noise, limited visitors, clustered nursing care  -  Reorient patient to person, place, time, and situation on each encounter  -  If possible, avoid restraints  -  May benefit from 24/7 supervision by sitter or camera sitter  -  Avoid/limit medications that  can worsen delirium (benzodiazepines, antihistamines, anticholinergics, hypnotics, opiates)  -  Encourage mobility, OOB in chair at least 3 hours per day, and early ambulation as appropriate  -  PT/OT evaluate and treat            Dysphagia    -TF per PEG        Aphasia    -patient non-verbal on exam        Will follow up with PM&R physicians tomorrow for further recommendation for severe spasticity management.      Thank you for your consult.     Irma Sharpe NP  Department of Physical Medicine & Rehab  [facility]

## 2017-12-21 NOTE — PROCEDURES
DATE OF SERVICE:  12/19/2017 and 12/20/2017.    ICU EEG/VIDEO MONITORING REPORT    METHODOLOGY:  Electroencephalographic (EEG) is recorded with electrodes placed   according to the International 10-20 placement system.  Thirty two (32) channels   of digital signal (sampling rate of 512/sec), including T1 and T2, were   simultaneously recorded from the scalp and may include EKG, EMG, and/or eye   monitors.  Recording band pass was 0.1 to 512 Hz.  Digital video recording of   the patient is simultaneously recorded with the EEG.  The patient is instructed   to report clinical symptoms which may occur during the recording session.  EEG   and video recording are stored and archived in digital format.  Activation   procedures, which include photic stimulation, hyperventilation and instructing   patients to perform simple tasks, are done in selected patients.    The EEG is displayed on a monitor screen and can be reviewed using different   montages.  Computer-assisted analysis is employed to detect spike and   electrographic seizure activity.  The entire record is submitted for computer   analysis.  The entire recording is visually reviewed, and the times identified   by computer analysis as being spikes or seizures are reviewed again.    Compressed spectral analysis (CSA) is also performed on the activity recorded   from each individual channel.  This is displayed as a power display of   frequencies from 0 to 30 Hz over time.  The CSA is reviewed looking for   asymmetries in power between homologous areas of the scalp, then compared with   the original EEG recording.    Ramen software was also utilized in the review of this study.  This software   suite analyzes the EEG recording in multiple domains.  Coherence and rhythmicity   are computed to identify EEG sections which may contain organized seizures.    Each channel undergoes analysis to detect the presence of spike and sharp waves   which have special and  morphological characteristics of epileptic activity.  The   routine EEG recording is converted from special into frequency domain.  This is   then displayed comparing homologous areas to identify areas of significant   asymmetry.  Algorithm to identify non-cortically generated artifact is used to   separate artifact from the EEG.    RECORDING TIMES:  The duration of this study is 27 hours and 58 minutes.  Study   begins December 19th at 07:00 and ends December 20th at 11:53.    EEG FINDINGS:  The background of this study consists of mixed frequency activity   with delta and theta activity seen diffusely with good variability, but a   consistent hemispheric asymmetry and the periodic lateralized epileptiform   discharges are nearly continuously noted in the left hemisphere.  The frequency   of the PLEDs does vary from one per second to one per 5 to 6 seconds over   various portions of the record.  The PLEDs remain periodic and pseudo-periodic   and do not attain a rhythmic pattern.  During this extended portion of   recording, no electrographic seizures were seen and the PLEDs remained stable   and essentially unchanged throughout this entire duration.  Overall, there was   little evolution in this EEG over this day and prior days, although there was   minute-to-minute variability.    No seizures were seen.  No other clinical events were seen.    INTERPRETATION:  Abnormal EEG due to:  1.  Moderate diffuse slowing of the overall background.  2.  The appearance of periodic lateralized epileptiform discharges in the left   hemisphere, which appear to be stable and unchanging throughout prolonged   recording.  Seizures were not seen.  The PLEDs represent cortical irritability   and the potential for seizures, but currently no seizures are seen with the   current treatment.      TERRY  dd: 12/20/2017 15:49:50 (CST)  td: 12/21/2017 01:20:58 (CST)  Doc ID   #[id number]  Job ID #[id number]    CC:

## 2017-12-21 NOTE — PROGRESS NOTES
[facility]  Adult Nutrition  Progress Note    SUMMARY     Recommendations    Recommendation/Intervention:   Pt with 50lb weight loss over past 11 months. Pt with generalized wasting. Recommend increasing TF. Isosource 1.5 @ 45mL/hr.   -Hold for residuals >500mL.     RD to monitor.    Goals: Meet % EEN, EPN  Nutrition Goal Status: goal met  Communication of RD Recs: reviewed with RN      Reason for Assessment    Reason for Assessment: RD follow-up  Diagnosis: other (see comments) (Seizure)  Relevent Medical History: HTN, PEG placement (12/2016)   Interdisciplinary Rounds: did not attend     General Information Comments: Pt remains on trach collar. Pt nonverbal, does not follow commands. Per chart review, pt with 50lb weight loss since last admission 11 months ago. No family at bedside.    Nutrition Discharge Planning: optimal nutrition via PEG with tolerance    Nutrition Prescription Ordered    Current Diet Order: NPO  Nutrition Order Comments: TF at goal  Current Nutrition Support Formula Ordered: Isosource 1.5  Current Nutrition Support Rate Ordered: 40 (ml)  Current Nutrition Support Frequency Ordered: mL/hr        Evaluation of Received Nutrients/Fluid Intake    Enteral Calories (kcal): 1440  Enteral Protein (gm): 65  Enteral (Free Water) Fluid (mL): 733      Energy Calories Required: meeting needs  % Kcal Needs: 92     Protein Required: meeting needs  % Protein Needs: 100     IV Fluid (mL): 1200  I/O: +I/O, good UOP, LBM 12/20         Fluid Required: meeting needs  Comments: 50mL residuals 12/20  Tolerance: tolerating  % Intake of Estimated Energy Needs: 75 - 100 %  % Meal Intake: NPO     Nutrition Risk Screen     Nutrition Risk Screen: tube feeding or parenteral nutrition, other (see comments) (trach)    Nutrition/Diet History    Patient Reported Diet/Restrictions/Preferences: other (see comments) (SHELLY)  Typical Food/Fluid Intake: SHELLY TF PTA. No family at bedside. Pt with weight loss  "over past year.  Food Preferences: [facility] [employer]/cultural food preferences.        Factors Affecting Nutritional Intake: impaired cognitive status/motor control, NPO, chewing difficulties/inability to chew food, difficulty/impaired swallowing                Labs/Tests/Procedures/Meds       Pertinent Labs Reviewed: reviewed, pertinent  Pertinent Labs Comments: noted  Pertinent Medications Reviewed: reviewed, pertinent  Pertinent Medications Comments: statin, IVF    Physical Findings    Overall Physical Appearance: generalized wasting, other (see comments) (trach collar)  Tubes: gastrostomy tube  Oral/Mouth Cavity: WDL  Skin: intact    Anthropometrics    Temp: 99.3 °F (37.4 °C)     Height: 5' 5" (165.1 cm) (Obtained from chart review)  Weight Method: Bed Scale  Weight: 54.4 kg (119 lb 14.9 oz)  Ideal Body Weight (IBW), Male: 136 lb     % Ideal Body Weight, Male (lb): 88.18 lb     BMI (Calculated): 20  BMI Grade: 18.5-24.9 - normal     Usual Body Weight (UBW), k.6 kg     % Usual Body Weight: 70.25  % Weight Change From Usual Weight: -29.9 %             Estimated/Assessed Needs    Weight Used For Calorie Calculations: 54.4 kg (119 lb 14.9 oz)      Energy Calorie Requirements (kcal): 1563 kcal/d  Energy Need Method: Argyle-St Jeor (1.25 PAL)        RMR (Argyle-St. Jeor Equation): 1250.88        Weight Used For Protein Calculations: 54.4 kg (119 lb 14.9 oz)  Protein Requirements: 55-65 g/d (1-1.2  g/kg)       Fluid Need Method: RDA Method, other (see comments) (Per MD or 1 mL/kcal)  RDA Method (mL): 1563               Assessment and Plan    Nutrition Problem  Inadequate energy intake     Related to (etiology):   Inability to consume sufficient energy     Signs and Symptoms (as evidenced by):   NPO with no alternate means of nutrition.     Nutrition Diagnosis Status:   Resolved      Monitor and Evaluation    Food and Nutrient Intake: enteral nutrition intake  Food and Nutrient Adminstration: enteral and parenteral " nutrition administration     Physical Activity and Function: nutrition-related ADLs and IADLs  Anthropometric Measurements: weight, weight change  Biochemical Data, Medical Tests and Procedures: lipid profile, inflammatory profile, glucose/endocrine profile, gastrointestinal profile, electrolyte and renal panel  Nutrition-Focused Physical Findings: overall appearance    Nutrition Risk    Level of Risk: other (see comments) (f/u 1x/week)    Nutrition Follow-Up    RD Follow-up?: Yes

## 2017-12-21 NOTE — HPI
Enrique Yancey is a 67-year-old male with PMHx of HTN, stenosis of left internal carotid artery with cerebral infarction (11/2016 with residual R weakness), seizure, and CAD s/p MI.  Patient presented to [facility] on 12/17 with seizure activity.  CTH revealed no acute pathology with chronic left MCA territory and bilateral thalamic infarcts.  EEG on 12/17 revealed periodic lateralized epileptiform discharges in the left hemisphere indicating cortical irritability and the potential for seizures in this region; however, no seizure activity was seen.     Functional History: Per chart review, [facility] [place] [facility] [place]. Prior to admission, he is non-verbal on baseline but can focus on the speaker.  He does have a trach collar in place.

## 2017-12-21 NOTE — PT/OT/SLP EVAL
Physical Therapy Evaluation    Patient Name:  Enrique Yancey   MRN:  [id number]    Recommendations:     Discharge Recommendations:  nursing facility, basic   Discharge Equipment Recommendations: none   Barriers to discharge: Decreased caregiver support    Assessment:     Enrique Yancey is a 67 y.o. male admitted with a medical diagnosis of Seizure.  He presents with the following impairments/functional limitations:  weakness, impaired endurance, impaired cognition, decreased safety awareness, impaired joint extensibility, impaired muscle length, decreased ROM, abnormal tone, impaired balance, decreased lower extremity function, decreased upper extremity function. In today's session, pt presents with no command following and nonverbal during session. Multiple contractures evident in both (B) UE and LE. Unable to sustain stretch at current time due to remaining spasticity. Appropriate for limited skilled services to address these deficits and trial improvement with PT intervention. Pt appropriate for return to NH    Rehab Prognosis:  Poor; patient would benefit from acute skilled PT services to address these deficits and reach maximum level of function.      Recent Surgery: * No surgery found *      Plan:     During this hospitalization, patient to be seen 2 x/week to address the above listed problems via therapeutic activities, therapeutic exercises  · Plan of Care Expires:  01/20/18   Plan of Care Reviewed with: patient    Subjective     Communicated with nsg prior to session.  Patient found supine in bed upon PT entry to room, agreeable to evaluation.      Chief Complaint: nonverbal  Patient comments/goals: SHELLY  Pain/Comfort:  · Pain Rating 1: 0/10    Patients cultural, spiritual, Zoroastrian conflicts given the current situation:      Living Environment:    Prior to admission, patients level of function was dependent; lived in NH  Patient has the following equipment: none.  DME owned (not currently used): none.  Upon  discharge, patient will have assistance from facility staff.    Objective:     Patient found with: blood pressure cuff, bowel management system, Condom Catheter, PEG Tube, tracheostomy, telemetry, SCD, pulse ox (continuous)     General Precautions: Standard, fall, seizure, aphasia, aspiration   Orthopedic Precautions:N/A   Braces: N/A     Exams:  · In today's session, fine motor, gross motor, and strength not tested due to lack of movement performed in session.  · Cognitive Exam:  Patient is oriented to 0 and follows 0% of one step commands   · Sensation:    · -       Intact; pt appears to feel therapist. Unclear due to pt current aphasic state  · Skin Integrity/Edema:  -       Skin integrity: Thin and Dry  · -       Edema: None noted in BLE  · RUE ROM: contracture evident with wrist flexion, finger flexion, and shoulder flexion; able to fully bend and mostly elbow, tightness in forearm with supination/pronation demonstrated  · LUE ROM: contracture evident with wrist flexion, finger flexion, and shoulder flexion; able to fully bend and mostly elbow, tightness in forearm with supination/pronation demonstrated  · RLE ROM: contracture evident with knee flexion (~75 deg) and hip flexion ( 45 degrees); limited ankle DF, hip abd/add denoted  · LLE ROM:  contracture evident with knee flexion (~75 deg) and hip flexion ( 45 degrees); limited ankle DF, hip abd/add denoted      Functional Mobility:  · PT performed rolling to L side to assist c/ hip flexor stretch with dependent position.     AM-PAC 6 CLICK MOBILITY  Total Score:6       Therapeutic Activities and Exercises:   PT performed PROM x 20 reps with slowed sustained stretch to (B) UE and (B) LE. PT placed rolled towels in hand to assist c/ extending fingers and stretch    Patient left supine with all lines intact and call button in reach.    GOALS:    Physical Therapy Goals        Problem: Physical Therapy Goal    Goal Priority Disciplines Outcome Goal Variances  Interventions   Physical Therapy Goal     PT/OT, PT Ongoing (interventions implemented as appropriate)     Description:  Goals to be met by: 14 days (18)    Patient will increase functional independence with mobility by performin. PT will demonstrate to family PROM technique and distribute handout to assist c/ carryover of activity.  2. PT will perform PROM x 20 reps to assist c/ slowed progression of (B) UE and LE contracture while in hospital.                      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     Stroke        Past Surgical History:   Procedure Laterality Date    KNEE SURGERY        in high school       Clinical Decision Making:     History  Co-morbidities and personal factors that may impact the plan of care Examination  Body Structures and Functions, activity limitations and participation restrictions that may impact the plan of care Clinical Presentation   Decision Making/ Complexity Score   Co-morbidities:   [] Time since onset of injury / illness / exacerbation  [] Status of current condition  []Patient's cognitive status and safety concerns    [] Multiple Medical Problems (see med hx)  Personal Factors:   [] Patient's age  [] Prior Level of function   [] Patient's home situation (environment and family support)  [] Patient's level of motivation  [] Expected progression of patient      HISTORY:(criteria)    [] 68664 - no personal factors/history    [] 56611 - has 1-2 personal factor/comorbidity     [] 15921 - has >3 personal factor/comorbidity     Body Regions:  [] Objective examination findings  [] Head     []  Neck  [] Trunk   [] Upper Extremity  [] Lower Extremity    Body Systems:  [] For communication ability, affect, cognition, language, and learning style: the assessment of the ability to make needs known, consciousness,  orientation (person, place, and time), expected emotional /behavioral responses, and learning preferences (eg, learning barriers, education  needs)  [] For the neuromuscular system: a general assessment of gross coordinated movement (eg, balance, gait, locomotion, transfers, and transitions) and motor function  (motor control and motor learning)  [] For the musculoskeletal system: the assessment of gross symmetry, gross range of motion, gross strength, height, and weight  [] For the integumentary system: the assessment of pliability(texture), presence of scar formation, skin color, and skin integrity  [] For cardiovascular/pulmonary system: the assessment of heart rate, respiratory rate, blood pressure, and edema     Activity limitations:    [] Patient's cognitive status and saf ety concerns          [] Status of current condition      [] Weight bearing restriction  [] Cardiopulmunary Restriction    Participation Restrictions:   [] Goals and goal agreement with the patient     [] Rehab potential (prognosis) and probable outcome      Examination of Body System: (criteria)    [] 27078 - addressing 1-2 elements    [] 02872 - addressing a total of 3 or more elements     [] 33798 -  Addressing a total of 4 or more elements         Clinical Presentation: (criteria)  Choose one     On examination of body system using standardized tests and measures patient presents with (CHOOSE ONE) elements from any of the following: body structures and functions, activity limitations, and/or participation restrictions.  Leading to a clinical presentation that is considered (CHOOSE ONE)                              Clinical Decision Making  (Eval Complexity):  Choose One     Time Tracking:     PT Received On: 12/21/17  PT Start Time: 1105     PT Stop Time: 1120  PT Total Time (min): 15 min     Billable Minutes: Evaluation 15      Marianna Castro, PT, DPT  12/21/2017

## 2017-12-21 NOTE — PLAN OF CARE
Problem: Physical Therapy Goal  Goal: Physical Therapy Goal  Goals to be met by: 14 days (18)    Patient will increase functional independence with mobility by performin. PT will demonstrate to family PROM technique and distribute handout to assist c/ carryover of activity.  2. PT will perform PROM x 20 reps to assist c/ slowed progression of (B) UE and LE contracture while in hospital.    Outcome: Ongoing (interventions implemented as appropriate)  PT evaluation completed. POC initiated.    Marianna Castro, PT, DPT  2017

## 2017-12-21 NOTE — PLAN OF CARE
Patient trache, peg, pending vent wean. Ready for placement, SW aware.  Discharge Plan: Trache NH, waiting on family choices.       12/21/17 0851   Discharge Reassessment   Assessment Type Discharge Planning Reassessment   Provided patient/caregiver education on the expected discharge date and the discharge plan No   Do you have any problems affording any of your prescribed medications? No   Discharge Plan A Return to nursing home   Discharge Plan B Return to Nursing Home   Patient choice form signed by patient/caregiver No   Can the patient answer the patient profile reliably? No, cognitively impaired   How does the patient rate their overall health at the present time? (lydia)   Describe the patient's ability to walk at the present time. Does not walk or unable to take any steps at all   How often would a person be available to care for the patient? Often   Number of comorbid conditions (as recorded on the chart) Five or more   During the past month, has the patient often been bothered by feeling down, depressed or hopeless? (lydia)   During the past month, has the patient often been bothered by little interest or pleasure in doing things? (lydia)       [facility] [address] [place] [address] [id number] MAG[address]  [facility]  [address] [facility] [address]  Phone: [phone] Fax: [phone]    Marisol Schroeder RN/HASMUKH  [phone]  [facility]

## 2017-12-21 NOTE — ASSESSMENT & PLAN NOTE
-CXR revealed possible pneumonia  -BCX NGTD  -respiratory culture revealed PROTEUS MIRABILIS  -on Vanc and Zosyn

## 2017-12-21 NOTE — PROGRESS NOTES
[facility]  Neurocritical Care  Progress Note    Admit Date: 12/17/2017  Service Date: 12/21/2017  Length of Stay: 4    Subjective:     Chief Complaint: Seizure    History of Present Illness: Mr dickerson is a  is a 67 y.o. 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    who presents to [facility] for evaluation of new onset Seizure activity. He presented to outside ED with seizure activity for approximately 20 minutes. Per EMS, he was diaphoretic on arrival and tachycardic. Per nursing home, pt is nonverbal at baseline, but can focus on the speaker. He has a trach in place. He is being admitted to [facility] for a higher level of care.     Hospital Course: 12/17: admit to [facility], EEG pending, Keppra 1 G  at OSH and 500 Q 12 started, CTH: No acute process   12/18:increase keppra per epilepsy, sputum culture, trend procal  12/19: start RISS, discussed with daughter at bedside, not happy with Noa.  12/20: Pending to wean off the vent. Pending placement. Last EEG : L-PLEDS. The patient is very contracted. S/p trach placement.01/2017.  12/21: Pending to wean off the vent. And placement. Last EEG : L-PLEDS.  Yesterday Vanc was D/C and we left him on Zosyn. He was placed on isolation for E.Coli ESBL. S/P trach / PEG. Rehab consult to assess for Botox  injections for spasticity. Tolerating trach collar.       Interval History:  >4 elements OR status of 3 inpatient conditions  Pending to wean off the vent. And placement. Last EEG : L-PLEDS.  Yesterday Vanc was D/C and we left him on Zosyn. He was placed on isolation for E.Coli ESBL. S/P trach / PEG. Rehab consult to assess for Botox  injections for spasticity. Tolerating trach collar.     Review of Systems   Unable to perform ROS: Patient nonverbal     2 systems OR Unable to obtain a complete ROS due to level of consciousness.  Objective:     Vitals:  Temp: 97.5 °F (36.4 °C) (12/21/17 1200)  Pulse: (!)  58 (12/21/17 1200)  Resp: 11 (12/21/17 1200)  BP: 110/65 (12/21/17 1200)  SpO2: 97 % (12/21/17 1200)    Temp:  [97.5 °F (36.4 °C)-100.6 °F (38.1 °C)] 97.5 °F (36.4 °C)  Pulse:  [58-97] 58  Resp:  [11-27] 11  SpO2:  [94 %-100 %] 97 %  BP: ()/(54-93) 110/65         Oxygen Concentration (%):  [40] 40    12/20 0701 - 12/21 0700  In: 2001.7 [I.V.:1061.7]  Out: 1800 [Urine:1800]    Physical Exam  Unable to test orientation, language, memory, judgment, insight, fund of knowledge, hearing, shoulder shrug, tongue protrusion, coordination, gait due to level of consciousness.  General   HEENT: s/ptrach.  Chest Heart RRR / Lungs Clear to auscultation  Abdomen: Soft nontender + BS; s/p PEG.  Extremities: OK distal pulses.  Skin: UK  Neurological Exam:  MS; Awake but not following commands.  CN: II-XII   Motor: LUE  1-2/5 / RUE 1-2/5   bilaterally bilateral contractures and spasticity             LLE   1-2/5 /  RLE1- 2/5   bilaterally bilateral contractures and spasticity  Sensory: LT/PP/T/ Vibration UK                 Complex sensory modalities: not tested  DTR:  normal throughout.  Coordination /Fine motor:Limited.  Gait: Not tested.  Meningeal signs: Absent     Medications:  Continuous  sodium chloride 0.9% Last Rate: Stopped (12/21/17 0957)   sodium chloride 0.9% Last Rate: 500 mL/hr at 12/21/17 0957   Scheduled  aspirin 81 mg Daily   atorvastatin 40 mg Daily   B-complex with vitamin C 1 tablet Daily   baclofen 20 mg TID   bethanechol 10 mg Q8H   chlorhexidine 15 mL BID   donepezil 5 mg Daily   enoxparin 40 mg Q24H   famotidine 20 mg BID   finasteride 5 mg Daily   FLUoxetine 20 mg Daily   levetiracetam oral soln 1,000 mg BID   levothyroxine 75 mcg Daily   mupirocin  BID   piperacillin-tazobactam 4.5 g in dextrose 5 % 100 mL IVPB (ready to mix system) 4.5 g Q8H   polyethylene glycol 17 g Daily   senna-docusate 8.6-50 mg 1 tablet Daily   tamsulosin 0.4 mg BID   PRN  acetaminophen 650 mg Q4H PRN   dextrose 50% 12.5 g PRN    glucagon (human recombinant) 1 mg PRN   insulin aspart 0-5 Units Q4H PRN   magnesium oxide 800 mg PRN   magnesium oxide 800 mg PRN   ondansetron 4 mg Q8H PRN   potassium chloride 10% 40 mEq PRN   potassium chloride 10% 40 mEq PRN   potassium chloride 10% 60 mEq PRN   potassium, sodium phosphates 2 packet PRN   potassium, sodium phosphates 2 packet PRN   potassium, sodium phosphates 2 packet PRN   sodium chloride 0.9% 3 mL PRN     Today I personally reviewed pertinent medications, lines/drains/airways, imaging, cardiology, lab results, microbiology results, notably:  CMP:      Recent Labs  Lab 12/21/17 0413 12/21/17  0808   CALCIUM 8.9  --    ALBUMIN 2.2*  --    PROT 7.3  --     141   K 3.8  --    CO2 21*  --    *  --    BUN 26*  --    CREATININE 1.0  --    ALKPHOS 185*  --    ALT 77*  --    AST 26  --    BILITOT 0.5  --       BMP:      Recent Labs  Lab 12/21/17  0413 12/21/17  0808    141   K 3.8  --    *  --    CO2 21*  --    BUN 26*  --    CREATININE 1.0  --    CALCIUM 8.9  --       CBC:      Recent Labs  Lab 12/21/17  0413   WBC 8.37   RBC 3.26*   HGB 9.9*   HCT 30.6*      MCV 94   MCH 30.4   MCHC 32.4      Lipid Panel: No results for input(s): CHOL, LDLCALC, HDL, TRIG in the last 168 hours.  Coagulation:      Recent Labs  Lab 12/17/17  0400 12/17/17  0500   INR 1.1 1.0   APTT 21.7  --       Platelet Aggregation Study: No results for input(s): PLTAGG, PLTAGINTERP, PLTAGREGLACO, ADPPLTAGGREG in the last 168 hours.  Hgb A1C: No results for input(s): HGBA1C in the last 168 hours.  TSH: No results for input(s): TSH in the last 168 hours.      Assessment/Plan:     Neuro   * Seizure    - epilepsy following  -keppra  1g BID        Toxic encephalopathy    - secondary to pneumonia, uti, dehydration        Aphasia    - Hx  prior Stroke        Pulmonary   Pneumonia due to infectious organism    POA  Continue vanc and zosyn  Chest PT  Suctioning          Cardiac/Vascular   Essential  hypertension    - SBP < 180   - Obtain home medication list   - PRN Labetalol         Renal/   Acute cystitis without hematuria    POA  Continue vanc and zosyn  Condom cath        CKD (chronic kidney disease) stage 3, GFR 30-59 ml/min    - Monitor labs   Monitor I/Os        GI   Dysphagia    - NPO  -  meds per peg   -tube feeds            Active Problem List:   1.New Onset SZ, status resolved.   2.Dermatomnyositis  3. HTN  4. Hx of L ICA stenosis and stroke  5. Pneumonia and UTI.  6. Spasticity and contractures on all 4 limbs.  7. Ventilatory failure s/p trach,  8. 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     Assessment / Plan:     Neuro:  -Keppra 1gr bid  -Donepezil 5mg qd  -ECASA 81 mg qd  -Lipitor 40mg qd  -Tylenol PRN  -PT/OT  -Multipodous  boots and splints  -Baclofen 20mg tid  -Rahb consult for botox and release of contractures..  Resp:  -Trach collar.  -Recruitment maneuvers q 6hrs  -ABG.  CV: Keep SBP <=160 mmHg. BP on the lower side.   No antihypertensives.  IVF/nutrition/Renal/GI:  -NS 500cc IVB. Over 1 hr. And D/C IVF.   -TF at goal.  -NS flushes 100cc q 6hrs  -Bladder scan  q 6hrs x 24hrs  -Serum Na q 6hrs  -BR  Hem / ID: UTI and pneumonia in [place] ESBL, Proteuns and providence and ESBL in urine. WBC normalized,  -Zosyn x 7 days,  -Contact isolation  Endo:  -SSI q 6hrs  -Lipitor 40mg qd  -Levothyroxine 0.075mg qd  Prophylaxis:  -Enoxiparin SC 40mg qd  -Famotidine.  Advance Directives and Disposition:    Full Code. Pending LTAC placement and keep in isolation.           Uninterrupted level 3  Follow-up /Counseling Time (not including procedures):  = 35 min          Activity Orders          Ambulate with assistance starting at 12/17 0447        Prior    Caden Correia MD  Neurocritical Care  [facility]

## 2017-12-21 NOTE — PLAN OF CARE
Problem: Patient Care Overview  Goal: Plan of Care Review  Outcome: Ongoing (interventions implemented as appropriate)  POC reviewed with pt and family at 1400. Pt nonverbal. Questions and concerns addressed with daughter. No acute events today. Pt progressing toward goals. Will continue to monitor. See flowsheets for full assessment and VS info.

## 2017-12-21 NOTE — PLAN OF CARE
Problem: Patient Care Overview  Goal: Plan of Care Review  Outcome: Ongoing (interventions implemented as appropriate)  POC reviewed with pt and family at 1500. Pt nonverbal. Questions and concerns addressed with daughter. No acute events today. Pt progressing toward goals. Will continue to monitor. See flowsheets for full assessment and VS info.

## 2017-12-21 NOTE — HOSPITAL COURSE
12/21/17: PT noted B/ UE and LE contractures with rolling to L side to assist with hip flexor stretch with dependent position.

## 2017-12-21 NOTE — PLAN OF CARE
JOSE LUIS spoke with Pt daughter regarding placement. She reported they have toured [facility] and want him to go there. [place] reported to them that they have a bed available.     JOSE LUIS [place] [facility] [place] via [employer]     Annie Payan LMSW  Neurocritical Care   [facility]  [id number]

## 2017-12-21 NOTE — CONSULTS
Inpatient consult to Physical Medicine Rehab  Consult performed by: MELY SEGURA  Consult ordered by: TRE BANDA  Reason for consult: assess spasticity         Reviewed patient history and current admission.  Rehab team following.  Full consult to follow.    VIET Lara, FNP-C  Physical Medicine & Rehabilitation   12/21/2017  Spectralink: [id number]

## 2017-12-22 LAB
ALBUMIN SERPL BCP-MCNC: 2.3 G/DL
ALLENS TEST: ABNORMAL
ALP SERPL-CCNC: 188 U/L
ALT SERPL W/O P-5'-P-CCNC: 70 U/L
ANION GAP SERPL CALC-SCNC: 9 MMOL/L
AST SERPL-CCNC: 31 U/L
BACTERIA BLD CULT: NORMAL
BACTERIA BLD CULT: NORMAL
BASOPHILS # BLD AUTO: 0.01 K/UL
BASOPHILS NFR BLD: 0.1 %
BILIRUB SERPL-MCNC: 0.4 MG/DL
BUN SERPL-MCNC: 28 MG/DL
CALCIUM SERPL-MCNC: 9.2 MG/DL
CHLORIDE SERPL-SCNC: 114 MMOL/L
CO2 SERPL-SCNC: 18 MMOL/L
CREAT SERPL-MCNC: 1.1 MG/DL
DELSYS: ABNORMAL
DIFFERENTIAL METHOD: ABNORMAL
EOSINOPHIL # BLD AUTO: 0.5 K/UL
EOSINOPHIL NFR BLD: 6.2 %
ERYTHROCYTE [DISTWIDTH] IN BLOOD BY AUTOMATED COUNT: 16.2 %
ERYTHROCYTE [SEDIMENTATION RATE] IN BLOOD BY WESTERGREN METHOD: 12 MM/H
EST. GFR  (AFRICAN AMERICAN): >60 ML/MIN/1.73 M^2
EST. GFR  (NON AFRICAN AMERICAN): >60 ML/MIN/1.73 M^2
FIO2: 40
FLOW: 10
GLUCOSE SERPL-MCNC: 113 MG/DL
HCO3 UR-SCNC: 22.3 MMOL/L (ref 24–28)
HCT VFR BLD AUTO: 34.1 %
HGB BLD-MCNC: 11.2 G/DL
IMM GRANULOCYTES # BLD AUTO: 0.1 K/UL
IMM GRANULOCYTES NFR BLD AUTO: 1.2 %
LYMPHOCYTES # BLD AUTO: 0.8 K/UL
LYMPHOCYTES NFR BLD: 9.7 %
MAGNESIUM SERPL-MCNC: 1.9 MG/DL
MCH RBC QN AUTO: 31.2 PG
MCHC RBC AUTO-ENTMCNC: 32.8 G/DL
MCV RBC AUTO: 95 FL
MODE: ABNORMAL
MONOCYTES # BLD AUTO: 0.7 K/UL
MONOCYTES NFR BLD: 7.9 %
NEUTROPHILS # BLD AUTO: 6.1 K/UL
NEUTROPHILS NFR BLD: 74.9 %
NRBC BLD-RTO: 0 /100 WBC
PCO2 BLDA: 33 MMHG (ref 35–45)
PH SMN: 7.44 [PH] (ref 7.35–7.45)
PHOSPHATE SERPL-MCNC: 3 MG/DL
PLATELET # BLD AUTO: 171 K/UL
PMV BLD AUTO: 11.6 FL
PO2 BLDA: 115 MMHG (ref 80–100)
POC BE: -2 MMOL/L
POC SATURATED O2: 99 % (ref 95–100)
POC TCO2: 23 MMOL/L (ref 23–27)
POCT GLUCOSE: 115 MG/DL (ref 70–110)
POCT GLUCOSE: 121 MG/DL (ref 70–110)
POTASSIUM SERPL-SCNC: 4.6 MMOL/L
PROT SERPL-MCNC: 7.9 G/DL
RBC # BLD AUTO: 3.59 M/UL
SAMPLE: ABNORMAL
SITE: ABNORMAL
SODIUM SERPL-SCNC: 140 MMOL/L
SODIUM SERPL-SCNC: 141 MMOL/L
SODIUM SERPL-SCNC: 142 MMOL/L
SP02: 98
WBC # BLD AUTO: 8.18 K/UL

## 2017-12-22 PROCEDURE — 25000003 PHARM REV CODE 250: Performed by: PSYCHIATRY & NEUROLOGY

## 2017-12-22 PROCEDURE — 27000221 HC OXYGEN, UP TO 24 HOURS

## 2017-12-22 PROCEDURE — 83735 ASSAY OF MAGNESIUM: CPT

## 2017-12-22 PROCEDURE — 25000003 PHARM REV CODE 250: Performed by: NURSE PRACTITIONER

## 2017-12-22 PROCEDURE — 82803 BLOOD GASES ANY COMBINATION: CPT

## 2017-12-22 PROCEDURE — 20000000 HC ICU ROOM

## 2017-12-22 PROCEDURE — 80053 COMPREHEN METABOLIC PANEL: CPT

## 2017-12-22 PROCEDURE — 25000003 PHARM REV CODE 250: Performed by: PHYSICIAN ASSISTANT

## 2017-12-22 PROCEDURE — 99232 SBSQ HOSP IP/OBS MODERATE 35: CPT | Mod: ,,, | Performed by: NURSE PRACTITIONER

## 2017-12-22 PROCEDURE — 36600 WITHDRAWAL OF ARTERIAL BLOOD: CPT

## 2017-12-22 PROCEDURE — 84100 ASSAY OF PHOSPHORUS: CPT

## 2017-12-22 PROCEDURE — 63600175 PHARM REV CODE 636 W HCPCS: Performed by: PHYSICIAN ASSISTANT

## 2017-12-22 PROCEDURE — 84295 ASSAY OF SERUM SODIUM: CPT

## 2017-12-22 PROCEDURE — 99233 SBSQ HOSP IP/OBS HIGH 50: CPT | Mod: GC,,, | Performed by: PSYCHIATRY & NEUROLOGY

## 2017-12-22 PROCEDURE — 85025 COMPLETE CBC W/AUTO DIFF WBC: CPT

## 2017-12-22 RX ORDER — DANTROLENE SODIUM 25 MG/1
25 CAPSULE ORAL EVERY 8 HOURS
Status: DISCONTINUED | OUTPATIENT
Start: 2017-12-22 | End: 2018-01-07

## 2017-12-22 RX ORDER — BACLOFEN 10 MG/1
10 TABLET ORAL 3 TIMES DAILY
Status: DISCONTINUED | OUTPATIENT
Start: 2017-12-22 | End: 2017-12-25

## 2017-12-22 RX ADMIN — ENOXAPARIN SODIUM 40 MG: 100 INJECTION SUBCUTANEOUS at 05:12

## 2017-12-22 RX ADMIN — FINASTERIDE 5 MG: 5 TABLET, FILM COATED ORAL at 08:12

## 2017-12-22 RX ADMIN — CHLORHEXIDINE GLUCONATE 15 ML: 1.2 RINSE ORAL at 09:12

## 2017-12-22 RX ADMIN — FAMOTIDINE 20 MG: 20 TABLET, FILM COATED ORAL at 08:12

## 2017-12-22 RX ADMIN — MINERAL OIL AND WHITE PETROLATUM: 150; 830 OINTMENT OPHTHALMIC at 09:12

## 2017-12-22 RX ADMIN — PIPERACILLIN SODIUM AND TAZOBACTAM SODIUM 4.5 G: 4; .5 INJECTION, POWDER, LYOPHILIZED, FOR SOLUTION INTRAVENOUS at 02:12

## 2017-12-22 RX ADMIN — MUPIROCIN: 20 OINTMENT TOPICAL at 09:12

## 2017-12-22 RX ADMIN — LEVOTHYROXINE SODIUM 75 MCG: 75 TABLET ORAL at 08:12

## 2017-12-22 RX ADMIN — ATORVASTATIN CALCIUM 40 MG: 20 TABLET, FILM COATED ORAL at 08:12

## 2017-12-22 RX ADMIN — BETHANECHOL CHLORIDE 10 MG: 10 TABLET ORAL at 09:12

## 2017-12-22 RX ADMIN — TAMSULOSIN HYDROCHLORIDE 0.4 MG: 0.4 CAPSULE ORAL at 08:12

## 2017-12-22 RX ADMIN — TAMSULOSIN HYDROCHLORIDE 0.4 MG: 0.4 CAPSULE ORAL at 09:12

## 2017-12-22 RX ADMIN — BACLOFEN 10 MG: 10 TABLET ORAL at 09:12

## 2017-12-22 RX ADMIN — BACLOFEN 20 MG: 10 TABLET ORAL at 02:12

## 2017-12-22 RX ADMIN — PIPERACILLIN SODIUM AND TAZOBACTAM SODIUM 4.5 G: 4; .5 INJECTION, POWDER, LYOPHILIZED, FOR SOLUTION INTRAVENOUS at 08:12

## 2017-12-22 RX ADMIN — LEVETIRACETAM 1000 MG: 100 SOLUTION ORAL at 08:12

## 2017-12-22 RX ADMIN — DONEPEZIL HYDROCHLORIDE 5 MG: 5 TABLET, FILM COATED ORAL at 08:12

## 2017-12-22 RX ADMIN — MUPIROCIN: 20 OINTMENT TOPICAL at 08:12

## 2017-12-22 RX ADMIN — FAMOTIDINE 20 MG: 20 TABLET, FILM COATED ORAL at 09:12

## 2017-12-22 RX ADMIN — DANTROLENE SODIUM 25 MG: 25 CAPSULE ORAL at 09:12

## 2017-12-22 RX ADMIN — BETHANECHOL CHLORIDE 10 MG: 10 TABLET ORAL at 02:12

## 2017-12-22 RX ADMIN — ASPIRIN 81 MG CHEWABLE TABLET 81 MG: 81 TABLET CHEWABLE at 08:12

## 2017-12-22 RX ADMIN — PIPERACILLIN SODIUM AND TAZOBACTAM SODIUM 4.5 G: 4; .5 INJECTION, POWDER, LYOPHILIZED, FOR SOLUTION INTRAVENOUS at 05:12

## 2017-12-22 RX ADMIN — VITAMIN C 1 TABLET: TAB at 08:12

## 2017-12-22 RX ADMIN — BACLOFEN 20 MG: 10 TABLET ORAL at 06:12

## 2017-12-22 RX ADMIN — BETHANECHOL CHLORIDE 10 MG: 10 TABLET ORAL at 06:12

## 2017-12-22 RX ADMIN — FLUOXETINE 20 MG: 20 CAPSULE ORAL at 08:12

## 2017-12-22 RX ADMIN — LEVETIRACETAM 1000 MG: 100 SOLUTION ORAL at 09:12

## 2017-12-22 NOTE — PLAN OF CARE
Ph: [phone] [facility].    SW contacted admissions to discuss the referral sent. Spoke with Ladarius. She reported she was not able to get all of the packet and wants it resent. SW resent to her via [employer]. She reported they will finish reviewing, but it will likely be Tuesday before they can accept. She also wants the PASRR and 142. SW will obtain.    SW sent PASRR to state to obtain the 142.    Annie Payan, RENALDO  Neurocritical Care   [facility]  [phone]

## 2017-12-22 NOTE — PHYSICIAN QUERY
PT Name: Enrique Yancey  MR #: [id number]    Physician Query Form - Cause and Effect Relationship Clarification      CDS/: Yaa Kirk RN, CDS               Contact information: [email or web address]    This form is a permanent document in the medical record.     Query Date: December 22, 2017    By submitting this query, we are merely seeking further clarification of documentation. Please utilize your independent clinical judgment when addressing the question(s) below.    The Medical record contains the following:  Supporting Clinical Findings   Location in record       --Pneumonia due to infectious organism    -POA              -Continue vanc and zosyn        --Yesterday Vanc was D/C and we left him on Zosyn.                                                                                                                                                                                  NCC Note 12/19--> 12/21      NCC Note 12/21        --Respiratory Culture PROVIDENCIA STUARTII    Moderate    --Respiratory Culture    PROTEUS MIRABILIS     Few                                                                                                                                                                                           Tracheal Aspirate 12/18         Provider, please clarify if there is any correlation between _Pneumonia due to infectious organism____ and _PROVIDENCIA STUARTII/ PROTEUS MIRABILIS___.           Are the conditions:     [ x ] Due to or associated with each other     [  ] Unrelated to each other     [  ] Other (Please Specify): _________________________     [  ] Clinically Undetermined

## 2017-12-22 NOTE — PROGRESS NOTES
[facility]  Physical Medicine & Rehab  Progress Note    Patient Name: Enrique Yancey  MRN: [id number]  Admission Date: 12/17/2017  Length of Stay: 5 days  Attending Physician: Smooth Hernandez MD  Primary Care Provider: Ang Hassan MD    Subjective:     Principal Problem:Seizure    Hospital Course:   12/21/17: PT noted B/ UE and LE contractures with rolling to L side to assist with hip flexor stretch with dependent position.      (12/22/2017): Patient is seen for follow-up spasticity evaluation and recommendations.  No acute events over night.  PT evaluated patient and assisted with L rolling in dependent position.      HPI, Past Medical, Surgical, Family, and Social History remains the same as documented in the initial encounter.      HPI, Past Medical, Family, and Social History remains the same as documented in the initial encounter.    Scheduled Medications:    aspirin  81 mg Per G Tube Daily    atorvastatin  40 mg Per G Tube Daily    B-complex with vitamin C  1 tablet Per G Tube Daily    baclofen  20 mg Per G Tube TID    bethanechol  10 mg Per G Tube Q8H    chlorhexidine  15 mL Mouth/Throat BID    donepezil  5 mg Per G Tube Daily    enoxparin  40 mg Subcutaneous Q24H    famotidine  20 mg Per G Tube BID    finasteride  5 mg Per G Tube Daily    FLUoxetine  20 mg Per G Tube Daily    levetiracetam oral soln  1,000 mg Per G Tube BID    levothyroxine  75 mcg Per G Tube Daily    mupirocin   Topical (Top) BID    piperacillin-tazobactam 4.5 g in dextrose 5 % 100 mL IVPB (ready to mix system)  4.5 g Intravenous Q8H    polyethylene glycol  17 g Per G Tube Daily    senna-docusate 8.6-50 mg  1 tablet Per G Tube Daily    tamsulosin  0.4 mg Per G Tube BID       PRN Medications: acetaminophen, dextrose 50%, glucagon (human recombinant), insulin aspart, magnesium oxide, magnesium oxide, ondansetron, potassium chloride 10%, potassium chloride 10%, potassium chloride 10%, potassium, sodium  phosphates, potassium, sodium phosphates, potassium, sodium phosphates, sodium chloride 0.9%    Review of Systems   Reason unable to perform ROS: non-verbal.     Objective:     Vital Signs (Most Recent):  Temp: 99.3 °F (37.4 °C) (12/22/17 1223)  Pulse: 97 (12/22/17 1223)  Resp: 18 (12/22/17 1223)  BP: 124/79 (12/22/17 1200)  SpO2: 95 % (12/22/17 1223)    Vital Signs (24h Range):  Temp:  [96.6 °F (35.9 °C)-99.3 °F (37.4 °C)] 99.3 °F (37.4 °C)  Pulse:  [] 97  Resp:  [8-25] 18  SpO2:  [95 %-100 %] 95 %  BP: (103-159)/(66-97) 124/79     Physical Exam   Constitutional: He appears cachectic. He appears ill.   HENT:   Head: Normocephalic and atraumatic.   Eyes: EOM are normal. Pupils are equal, round, and reactive to light.   Neck: Neck supple.   Cardiovascular: Normal rate and regular rhythm.    Pulmonary/Chest:   Trach with vent   Abdominal: Soft.   PEG placed   Musculoskeletal:   B/l UE & LE contracted   Neurological:   Somnolent but does open eye to voice and stimuli.  Speech: non-verbal.  Does not follow commands.    Skin: Skin is warm and dry.   Psychiatric: Cognition and memory are impaired.       Diagnostic Results:   Labs: Reviewed  ECG: Reviewed  X-Ray: Reviewed  US: Reviewed  CT: Reviewed  Assessment/Plan:      * Seizure    -h/o seizure from admission in 11/2016  -EEG on 12.17 revealed         Spasticity    -b/l UE & LE contractures noted  -per MAR, patient is currently on Baclofen 20mg per PEG  -Botox injections only performed in outpatient clinic  - Further recs for spasticity management per Dr. Dow with PM&R to Dr. Correia with [facility]        Pneumonia due to infectious organism    -CXR revealed possible pneumonia  -BCX NGTD  -respiratory culture revealed PROTEUS MIRABILIS  -on Vanc and Zosyn        Acute cystitis without hematuria    -urine culture revealed E.coli on 12/16  -on Zosyn IV          Toxic encephalopathy    Recommendations  -  Monitor sleep disturbances and establish consistent sleep-wake  cycle  ·  Day time- lights on and shades open, night time- lights dim/off  -  Environmental modifications to limit agitation/confusion   · Appropriate lighting, family at bedside, visible clock and calendar, updated white board, reduce noise, limited visitors, clustered nursing care  -  Reorient patient to person, place, time, and situation on each encounter  -  If possible, avoid restraints  -  May benefit from 24/7 supervision by sitter or camera sitter  -  Avoid/limit medications that can worsen delirium (benzodiazepines, antihistamines, anticholinergics, hypnotics, opiates)  -  Encourage mobility, OOB in chair at least 3 hours per day, and early ambulation as appropriate  -  PT/OT evaluate and treat        Dysphagia    -TF per PEG        Aphasia    -patient non-verbal on exam        Further recommendations for spasticity management of contracted bilateral UE and LE per Dr. Dow PM&R.  Will sign off.  Please call with questions/concerns or re-consult if situation changes.      Irma Sharpe NP  Department of Physical Medicine & Rehab   [facility]

## 2017-12-22 NOTE — SUBJECTIVE & OBJECTIVE
(12/22/2017): Patient is seen for follow-up spasticity evaluation and recommendations.  No acute events over night.  PT evaluated patient and assisted with L rolling in dependent position.      HPI, Past Medical, Surgical, Family, and Social History remains the same as documented in the initial encounter.      HPI, Past Medical, Family, and Social History remains the same as documented in the initial encounter.    Scheduled Medications:    aspirin  81 mg Per G Tube Daily    atorvastatin  40 mg Per G Tube Daily    B-complex with vitamin C  1 tablet Per G Tube Daily    baclofen  20 mg Per G Tube TID    bethanechol  10 mg Per G Tube Q8H    chlorhexidine  15 mL Mouth/Throat BID    donepezil  5 mg Per G Tube Daily    enoxparin  40 mg Subcutaneous Q24H    famotidine  20 mg Per G Tube BID    finasteride  5 mg Per G Tube Daily    FLUoxetine  20 mg Per G Tube Daily    levetiracetam oral soln  1,000 mg Per G Tube BID    levothyroxine  75 mcg Per G Tube Daily    mupirocin   Topical (Top) BID    piperacillin-tazobactam 4.5 g in dextrose 5 % 100 mL IVPB (ready to mix system)  4.5 g Intravenous Q8H    polyethylene glycol  17 g Per G Tube Daily    senna-docusate 8.6-50 mg  1 tablet Per G Tube Daily    tamsulosin  0.4 mg Per G Tube BID       PRN Medications: acetaminophen, dextrose 50%, glucagon (human recombinant), insulin aspart, magnesium oxide, magnesium oxide, ondansetron, potassium chloride 10%, potassium chloride 10%, potassium chloride 10%, potassium, sodium phosphates, potassium, sodium phosphates, potassium, sodium phosphates, sodium chloride 0.9%    Review of Systems   Reason unable to perform ROS: non-verbal.     Objective:     Vital Signs (Most Recent):  Temp: 99.3 °F (37.4 °C) (12/22/17 1223)  Pulse: 97 (12/22/17 1223)  Resp: 18 (12/22/17 1223)  BP: 124/79 (12/22/17 1200)  SpO2: 95 % (12/22/17 1223)    Vital Signs (24h Range):  Temp:  [96.6 °F (35.9 °C)-99.3 °F (37.4 °C)] 99.3 °F (37.4 °C)  Pulse:   [] 97  Resp:  [8-25] 18  SpO2:  [95 %-100 %] 95 %  BP: (103-159)/(66-97) 124/79     Physical Exam   Constitutional: He appears cachectic. He appears ill.   HENT:   Head: Normocephalic and atraumatic.   Eyes: EOM are normal. Pupils are equal, round, and reactive to light.   Neck: Neck supple.   Cardiovascular: Normal rate and regular rhythm.    Pulmonary/Chest:   Trach with vent   Abdominal: Soft.   PEG placed   Musculoskeletal:   B/l UE & LE contracted   Neurological:   Somnolent but does open eye to voice and stimuli.  Speech: non-verbal.  Does not follow commands.       Skin: Skin is warm and dry.   Psychiatric: Cognition and memory are impaired.     NEUROLOGICAL EXAMINATION:     CRANIAL NERVES     CN III, IV, VI   Pupils are equal, round, and reactive to light.  Extraocular motions are normal.

## 2017-12-22 NOTE — ASSESSMENT & PLAN NOTE
-b/l UE & LE contractures noted  -per MAR, patient is currently on Baclofen 20mg per PEG  -Botox injections only performed in outpatient clinic  - Further recs for spasticity management per Dr. Dow with PM&R to Dr. Correia with [facility]

## 2017-12-22 NOTE — PLAN OF CARE
Problem: Patient Care Overview  Goal: Plan of Care Review  Outcome: Ongoing (interventions implemented as appropriate)  POC reviewed with pt at 0400. Pt nonverbal; no evidence of learning. Pt had 2 BMs overnight. NS @ 50 ml/hr. No labs to be replaced. No acute events overnight. Pt progressing toward goals. Will continue to monitor. See flowsheets for full assessment and VS info

## 2017-12-23 LAB
ALBUMIN SERPL BCP-MCNC: 2.3 G/DL
ALP SERPL-CCNC: 175 U/L
ALT SERPL W/O P-5'-P-CCNC: 59 U/L
ANION GAP SERPL CALC-SCNC: 8 MMOL/L
AST SERPL-CCNC: 27 U/L
BASOPHILS # BLD AUTO: 0.02 K/UL
BASOPHILS NFR BLD: 0.3 %
BILIRUB SERPL-MCNC: 0.4 MG/DL
BUN SERPL-MCNC: 31 MG/DL
CALCIUM SERPL-MCNC: 9 MG/DL
CHLORIDE SERPL-SCNC: 113 MMOL/L
CO2 SERPL-SCNC: 20 MMOL/L
CREAT SERPL-MCNC: 1.1 MG/DL
DIFFERENTIAL METHOD: ABNORMAL
EOSINOPHIL # BLD AUTO: 0.4 K/UL
EOSINOPHIL NFR BLD: 5.8 %
ERYTHROCYTE [DISTWIDTH] IN BLOOD BY AUTOMATED COUNT: 15.9 %
EST. GFR  (AFRICAN AMERICAN): >60 ML/MIN/1.73 M^2
EST. GFR  (NON AFRICAN AMERICAN): >60 ML/MIN/1.73 M^2
GLUCOSE SERPL-MCNC: 118 MG/DL
HCT VFR BLD AUTO: 33.3 %
HGB BLD-MCNC: 10.7 G/DL
IMM GRANULOCYTES # BLD AUTO: 0.13 K/UL
IMM GRANULOCYTES NFR BLD AUTO: 1.7 %
LYMPHOCYTES # BLD AUTO: 1.1 K/UL
LYMPHOCYTES NFR BLD: 14.9 %
MAGNESIUM SERPL-MCNC: 1.8 MG/DL
MCH RBC QN AUTO: 30.9 PG
MCHC RBC AUTO-ENTMCNC: 32.1 G/DL
MCV RBC AUTO: 96 FL
MONOCYTES # BLD AUTO: 0.8 K/UL
MONOCYTES NFR BLD: 11 %
NEUTROPHILS # BLD AUTO: 5 K/UL
NEUTROPHILS NFR BLD: 66.3 %
NRBC BLD-RTO: 0 /100 WBC
PHOSPHATE SERPL-MCNC: 3 MG/DL
PLATELET # BLD AUTO: 174 K/UL
PMV BLD AUTO: 11.3 FL
POCT GLUCOSE: 131 MG/DL (ref 70–110)
POCT GLUCOSE: 143 MG/DL (ref 70–110)
POCT GLUCOSE: 168 MG/DL (ref 70–110)
POCT GLUCOSE: 68 MG/DL (ref 70–110)
POCT GLUCOSE: 84 MG/DL (ref 70–110)
POTASSIUM SERPL-SCNC: 4 MMOL/L
PROT SERPL-MCNC: 7.6 G/DL
RBC # BLD AUTO: 3.46 M/UL
SODIUM SERPL-SCNC: 141 MMOL/L
SODIUM SERPL-SCNC: 141 MMOL/L
SODIUM SERPL-SCNC: 142 MMOL/L
WBC # BLD AUTO: 7.57 K/UL

## 2017-12-23 PROCEDURE — 25000003 PHARM REV CODE 250: Performed by: NURSE PRACTITIONER

## 2017-12-23 PROCEDURE — 20000000 HC ICU ROOM

## 2017-12-23 PROCEDURE — 85025 COMPLETE CBC W/AUTO DIFF WBC: CPT

## 2017-12-23 PROCEDURE — 84100 ASSAY OF PHOSPHORUS: CPT

## 2017-12-23 PROCEDURE — 83735 ASSAY OF MAGNESIUM: CPT

## 2017-12-23 PROCEDURE — 25000003 PHARM REV CODE 250: Performed by: PSYCHIATRY & NEUROLOGY

## 2017-12-23 PROCEDURE — 99232 SBSQ HOSP IP/OBS MODERATE 35: CPT | Mod: ,,, | Performed by: PHYSICIAN ASSISTANT

## 2017-12-23 PROCEDURE — 25000003 PHARM REV CODE 250: Performed by: PHYSICIAN ASSISTANT

## 2017-12-23 PROCEDURE — 63600175 PHARM REV CODE 636 W HCPCS: Performed by: PHYSICIAN ASSISTANT

## 2017-12-23 PROCEDURE — 84295 ASSAY OF SERUM SODIUM: CPT

## 2017-12-23 PROCEDURE — 80053 COMPREHEN METABOLIC PANEL: CPT

## 2017-12-23 PROCEDURE — 99900026 HC AIRWAY MAINTENANCE (STAT)

## 2017-12-23 PROCEDURE — 27000221 HC OXYGEN, UP TO 24 HOURS

## 2017-12-23 RX ADMIN — PIPERACILLIN SODIUM AND TAZOBACTAM SODIUM 4.5 G: 4; .5 INJECTION, POWDER, LYOPHILIZED, FOR SOLUTION INTRAVENOUS at 02:12

## 2017-12-23 RX ADMIN — DONEPEZIL HYDROCHLORIDE 5 MG: 5 TABLET, FILM COATED ORAL at 03:12

## 2017-12-23 RX ADMIN — FAMOTIDINE 20 MG: 20 TABLET, FILM COATED ORAL at 09:12

## 2017-12-23 RX ADMIN — ATORVASTATIN CALCIUM 40 MG: 20 TABLET, FILM COATED ORAL at 09:12

## 2017-12-23 RX ADMIN — TAMSULOSIN HYDROCHLORIDE 0.4 MG: 0.4 CAPSULE ORAL at 10:12

## 2017-12-23 RX ADMIN — BACLOFEN 10 MG: 10 TABLET ORAL at 03:12

## 2017-12-23 RX ADMIN — ENOXAPARIN SODIUM 40 MG: 100 INJECTION SUBCUTANEOUS at 05:12

## 2017-12-23 RX ADMIN — FINASTERIDE 5 MG: 5 TABLET, FILM COATED ORAL at 09:12

## 2017-12-23 RX ADMIN — FLUOXETINE 20 MG: 20 CAPSULE ORAL at 09:12

## 2017-12-23 RX ADMIN — CHLORHEXIDINE GLUCONATE 15 ML: 1.2 RINSE ORAL at 10:12

## 2017-12-23 RX ADMIN — BACLOFEN 10 MG: 10 TABLET ORAL at 10:12

## 2017-12-23 RX ADMIN — PIPERACILLIN SODIUM AND TAZOBACTAM SODIUM 4.5 G: 4; .5 INJECTION, POWDER, LYOPHILIZED, FOR SOLUTION INTRAVENOUS at 05:12

## 2017-12-23 RX ADMIN — MUPIROCIN: 20 OINTMENT TOPICAL at 10:12

## 2017-12-23 RX ADMIN — LEVOTHYROXINE SODIUM 75 MCG: 75 TABLET ORAL at 09:12

## 2017-12-23 RX ADMIN — MUPIROCIN: 20 OINTMENT TOPICAL at 09:12

## 2017-12-23 RX ADMIN — DANTROLENE SODIUM 25 MG: 25 CAPSULE ORAL at 10:12

## 2017-12-23 RX ADMIN — ASPIRIN 81 MG CHEWABLE TABLET 81 MG: 81 TABLET CHEWABLE at 09:12

## 2017-12-23 RX ADMIN — DANTROLENE SODIUM 25 MG: 25 CAPSULE ORAL at 03:12

## 2017-12-23 RX ADMIN — DANTROLENE SODIUM 25 MG: 25 CAPSULE ORAL at 05:12

## 2017-12-23 RX ADMIN — VITAMIN C 1 TABLET: TAB at 09:12

## 2017-12-23 RX ADMIN — BACLOFEN 10 MG: 10 TABLET ORAL at 05:12

## 2017-12-23 RX ADMIN — FAMOTIDINE 20 MG: 20 TABLET, FILM COATED ORAL at 10:12

## 2017-12-23 RX ADMIN — PIPERACILLIN SODIUM AND TAZOBACTAM SODIUM 4.5 G: 4; .5 INJECTION, POWDER, LYOPHILIZED, FOR SOLUTION INTRAVENOUS at 09:12

## 2017-12-23 RX ADMIN — MAGNESIUM OXIDE TAB 400 MG (241.3 MG ELEMENTAL MG) 800 MG: 400 (241.3 MG) TAB at 05:12

## 2017-12-23 RX ADMIN — BETHANECHOL CHLORIDE 10 MG: 10 TABLET ORAL at 05:12

## 2017-12-23 RX ADMIN — MINERAL OIL AND WHITE PETROLATUM: 150; 830 OINTMENT OPHTHALMIC at 10:12

## 2017-12-23 RX ADMIN — TAMSULOSIN HYDROCHLORIDE 0.4 MG: 0.4 CAPSULE ORAL at 09:12

## 2017-12-23 RX ADMIN — BETHANECHOL CHLORIDE 10 MG: 10 TABLET ORAL at 03:12

## 2017-12-23 RX ADMIN — LEVETIRACETAM 1000 MG: 100 SOLUTION ORAL at 10:12

## 2017-12-23 RX ADMIN — BETHANECHOL CHLORIDE 10 MG: 10 TABLET ORAL at 10:12

## 2017-12-23 RX ADMIN — LEVETIRACETAM 1000 MG: 100 SOLUTION ORAL at 09:12

## 2017-12-23 NOTE — PROGRESS NOTES
[facility]  Neurocritical Care  Progress Note    Admit Date: 12/17/2017  Service Date: 12/22/2017  Length of Stay: 5    Subjective:     Chief Complaint: Seizure    History of Present Illness: Mr dickerson is a  is a 67 y.o. 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    who presents to [facility] for evaluation of new onset Seizure activity. He presented to outside ED with seizure activity for approximately 20 minutes. Per EMS, he was diaphoretic on arrival and tachycardic. Per nursing home, pt is nonverbal at baseline, but can focus on the speaker. He has a trach in place. He is being admitted to [facility] for a higher level of care.     Hospital Course: 12/17: admit to [facility], EEG pending, Keppra 1 G  at OSH and 500 Q 12 started, CTH: No acute process   12/18:increase keppra per epilepsy, sputum culture, trend procal  12/19: start RISS, discussed with daughter at bedside, not happy with Noa.  12/20: Pending to wean off the vent. Pending placement. Last EEG : L-PLEDS. The patient is very contracted. S/p trach placement.01/2017.  12/21: Pending to wean off the vent. And placement. Last EEG : L-PLEDS.  Yesterday Vanc was D/C and we left him on Zosyn. He was placed on isolation for E.Coli ESBL. S/P trach / PEG. Rehab consult to assess for Botox  injections for spasticity. Tolerating trach collar.   12/22: No clinical changes. Patient on day 2 of trach collar. ABG OK. Started on Dantrolene and pending to wean Baclofen off..    Interval History: >4 elements OR status of 3 inpatient conditions  No clinical changes. Patient on day 2 of trach collar. ABG OK. Started on Dantrolene and pending to wean Baclofen off.  Review of Systems  2 systems OR Unable to obtain a complete ROS due to level of consciousness.  Objective:     Vitals:  Temp: 98.8 °F (37.1 °C) (12/22/17 1800)  Pulse: 73 (12/22/17 1800)  Resp: 14 (12/22/17 1800)  BP: 134/77 (12/22/17  1800)  SpO2: 99 % (12/22/17 1800)    Temp:  [97.5 °F (36.4 °C)-99.3 °F (37.4 °C)] 98.8 °F (37.1 °C)  Pulse:  [] 73  Resp:  [10-25] 14  SpO2:  [95 %-100 %] 99 %  BP: ()/(62-97) 134/77         Oxygen Concentration (%):  [40] 40    12/21 0701 - 12/22 0700  In: 2274.2 [I.V.:844.2]  Out: 2010 [Urine:2010]    Physical Exam  Unable to test orientation, language, memory, judgment, insight, fund of knowledge, hearing, shoulder shrug, tongue protrusion, coordination, gait due to level of consciousness.  General   HEENT: s/ptrach. L eye with conjunctival congestion  Chest Heart RRR / Lungs Clear to auscultation  Abdomen: Soft nontender + BS; s/p PEG.  Extremities: OK distal pulses.  Skin:   Neurological Exam:  MS; Awake but not following commands.  CN: II-XII   Motor: LUE  1-2/5 / RUE 1-2/5   bilaterally bilateral contractures and spasticity             LLE   1-2/5 /  RLE1- 2/5   bilaterally bilateral contractures and spasticity  Sensory: LT/PP/T/ Vibration                  Complex sensory modalities: not tested  DTR:  normal throughout.  Coordination /Fine motor:Limited.  Gait: Not tested.  Meningeal signs: Absent  Medications:  Continuous Scheduled  aspirin 81 mg Daily   atorvastatin 40 mg Daily   B-complex with vitamin C 1 tablet Daily   baclofen 10 mg TID   bethanechol 10 mg Q8H   chlorhexidine 15 mL BID   dantrolene 25 mg Q8H   donepezil 5 mg Daily   enoxparin 40 mg Q24H   famotidine 20 mg BID   finasteride 5 mg Daily   FLUoxetine 20 mg Daily   levetiracetam oral soln 1,000 mg BID   levothyroxine 75 mcg Daily   mupirocin  BID   piperacillin-tazobactam 4.5 g in dextrose 5 % 100 mL IVPB (ready to mix system) 4.5 g Q8H   polyethylene glycol 17 g Daily   senna-docusate 8.6-50 mg 1 tablet Daily   tamsulosin 0.4 mg BID   white petrolatum-mineral oil  QHS   PRN  acetaminophen 650 mg Q4H PRN   dextrose 50% 12.5 g PRN   glucagon (human recombinant) 1 mg PRN   insulin aspart 0-5 Units Q4H PRN   magnesium oxide 800  mg PRN   magnesium oxide 800 mg PRN   ondansetron 4 mg Q8H PRN   potassium chloride 10% 40 mEq PRN   potassium chloride 10% 40 mEq PRN   potassium chloride 10% 60 mEq PRN   potassium, sodium phosphates 2 packet PRN   potassium, sodium phosphates 2 packet PRN   potassium, sodium phosphates 2 packet PRN   sodium chloride 0.9% 3 mL PRN     Today I personally reviewed pertinent medications, lines/drains/airways, imaging, cardiology, lab results, microbiology results, notably:  CMP:      Recent Labs  Lab 12/21/17 0413 12/21/17  0808   CALCIUM 8.9  --    ALBUMIN 2.2*  --    PROT 7.3  --     141   K 3.8  --    CO2 21*  --    *  --    BUN 26*  --    CREATININE 1.0  --    ALKPHOS 185*  --    ALT 77*  --    AST 26  --    BILITOT 0.5  --       BMP:      Recent Labs  Lab 12/21/17 0413 12/21/17  0808    141   K 3.8  --    *  --    CO2 21*  --    BUN 26*  --    CREATININE 1.0  --    CALCIUM 8.9  --       CBC:      Recent Labs  Lab 12/21/17  0413   WBC 8.37   RBC 3.26*   HGB 9.9*   HCT 30.6*      MCV 94   MCH 30.4   MCHC 32.4      Lipid Panel: No results for input(s): CHOL, LDLCALC, HDL, TRIG in the last 168 hours.  Coagulation:      Recent Labs  Lab 12/17/17  0400 12/17/17  0500   INR 1.1 1.0   APTT 21.7            Assessment/Plan:     Neuro   * Seizure    - epilepsy following  -keppra  1g BID        Toxic encephalopathy    - secondary to pneumonia, uti, dehydration        Aphasia    - Hx  prior Stroke        Pulmonary   Pneumonia due to infectious organism    POA  Continue vanc and zosyn  Chest PT  Suctioning          Cardiac/Vascular   Essential hypertension    - SBP < 180   - Obtain home medication list   - PRN Labetalol         Renal/   Acute cystitis without hematuria    POA  Continue vanc and zosyn  Condom cath        CKD (chronic kidney disease) stage 3, GFR 30-59 ml/min    - Monitor labs   Monitor I/Os        GI   Dysphagia    - NPO  -  meds per peg   -tube feeds            Active  Problem List:   1.New Onset SZ, status resolved.   2.Dermatomnyositis  3. HTN  4. Hx of L ICA stenosis and stroke  5. Pneumonia and UTI.  6. Spasticity and contractures on all 4 limbs.  7. Ventilatory failure s/p trach,  8. 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     Assessment / Plan:     Neuro:  -Keppra 1gr bid  -Donepezil 5mg qd  -ECASA 81 mg qd  -Lipitor 40mg qd  -Tylenol PRN  -PT/OT  -Multipodous  boots and splints  -Baclofen 10 mg tid. Decrease and taper.   -Dantrolene 25mg q 8hrs. In 1 week increase to 50mg q 8hrs.  Resp:  -Trach collar.  -Recruitment maneuvers q 6hrs  -ABG today.  -Lacrilube drops q 6hrs  CV: Keep SBP <=160 mmHg. BP on the lower side.   No antihypertensives.  IVF/nutrition/Renal/GI:  -NS  D/C    -TF at goal.  -NS  flushes 100cc q 4hrs  -Bladder scan  q 6hrs x 24hrs  -Serum Na q 12hrs  -BR  Hem / ID: UTI and pneumonia in TX ESBL, Proteuns and providence and ESBL in urine. WBC normalized,  -Zosyn x 7 days,  -Contact isolation  Endo:  -SSI q 6hrs  -Lipitor 40mg qd  -Levothyroxine 0.075mg qd  Prophylaxis:  -Enoxiparin SC 40mg qd  -Famotidine.  Advance Directives and Disposition:    Full Code. Pending LTAC / SNF  placement and keep in isolation. Consider transfer to the SDU on Monday. Consider outpatient follow-up for Botox evaluation.           Uninterrupted level 3  Follow-up /Counseling Time (not including procedures):  = 35 min                   Activity Orders          Ambulate with assistance starting at 12/17 9347        Prior    Caden Correia MD  Neurocritical Care  [facility]

## 2017-12-23 NOTE — PLAN OF CARE
Problem: Patient Care Overview  Goal: Plan of Care Review  Outcome: Ongoing (interventions implemented as appropriate)  POC reviewed with pt and family at 1300. Family verbalized understanding, pt unable to. Questions and concerns addressed. No acute events today. TF at 40 ml/hr. Pt progressing toward goals. Will continue to monitor. See flowsheets for full assessment and VS info.

## 2017-12-23 NOTE — SUBJECTIVE & OBJECTIVE
Interval History: >4 elements OR status of 3 inpatient conditions  No clinical changes. Patient on day 2 of trach collar. ABG OK. Started on Dantrolene and pending to wean Baclofen off.  Review of Systems  2 systems OR Unable to obtain a complete ROS due to level of consciousness.  Objective:     Vitals:  Temp: 98.8 °F (37.1 °C) (12/22/17 1800)  Pulse: 73 (12/22/17 1800)  Resp: 14 (12/22/17 1800)  BP: 134/77 (12/22/17 1800)  SpO2: 99 % (12/22/17 1800)    Temp:  [97.5 °F (36.4 °C)-99.3 °F (37.4 °C)] 98.8 °F (37.1 °C)  Pulse:  [] 73  Resp:  [10-25] 14  SpO2:  [95 %-100 %] 99 %  BP: ()/(62-97) 134/77         Oxygen Concentration (%):  [40] 40    12/21 0701 - 12/22 0700  In: 2274.2 [I.V.:844.2]  Out: 2010 [Urine:2010]    Physical Exam  Unable to test orientation, language, memory, judgment, insight, fund of knowledge, hearing, shoulder shrug, tongue protrusion, coordination, gait due to level of consciousness.  General   HEENT: s/ptrach. L eye with conjunctival congestion  Chest Heart RRR / Lungs Clear to auscultation  Abdomen: Soft nontender + BS; s/p PEG.  Extremities: OK distal pulses.  Skin: UK  Neurological Exam:  MS; Awake but not following commands.  CN: II-XII UK  Motor: LUE  1-2/5 / RUE 1-2/5   bilaterally bilateral contractures and spasticity             LLE   1-2/5 /  RLE1- 2/5   bilaterally bilateral contractures and spasticity  Sensory: LT/PP/T/ Vibration UK                 Complex sensory modalities: not tested  DTR:  normal throughout.  Coordination /Fine motor:Limited.  Gait: Not tested.  Meningeal signs: Absent  Medications:  Continuous Scheduled  aspirin 81 mg Daily   atorvastatin 40 mg Daily   B-complex with vitamin C 1 tablet Daily   baclofen 10 mg TID   bethanechol 10 mg Q8H   chlorhexidine 15 mL BID   dantrolene 25 mg Q8H   donepezil 5 mg Daily   enoxparin 40 mg Q24H   famotidine 20 mg BID   finasteride 5 mg Daily   FLUoxetine 20 mg Daily   levetiracetam oral soln 1,000 mg BID    levothyroxine 75 mcg Daily   mupirocin  BID   piperacillin-tazobactam 4.5 g in dextrose 5 % 100 mL IVPB (ready to mix system) 4.5 g Q8H   polyethylene glycol 17 g Daily   senna-docusate 8.6-50 mg 1 tablet Daily   tamsulosin 0.4 mg BID   white petrolatum-mineral oil  QHS   PRN  acetaminophen 650 mg Q4H PRN   dextrose 50% 12.5 g PRN   glucagon (human recombinant) 1 mg PRN   insulin aspart 0-5 Units Q4H PRN   magnesium oxide 800 mg PRN   magnesium oxide 800 mg PRN   ondansetron 4 mg Q8H PRN   potassium chloride 10% 40 mEq PRN   potassium chloride 10% 40 mEq PRN   potassium chloride 10% 60 mEq PRN   potassium, sodium phosphates 2 packet PRN   potassium, sodium phosphates 2 packet PRN   potassium, sodium phosphates 2 packet PRN   sodium chloride 0.9% 3 mL PRN     Today I personally reviewed pertinent medications, lines/drains/airways, imaging, cardiology, lab results, microbiology results, notably:  CMP:      Recent Labs  Lab 12/21/17  0413 12/21/17  0808   CALCIUM 8.9  --    ALBUMIN 2.2*  --    PROT 7.3  --     141   K 3.8  --    CO2 21*  --    *  --    BUN 26*  --    CREATININE 1.0  --    ALKPHOS 185*  --    ALT 77*  --    AST 26  --    BILITOT 0.5  --       BMP:      Recent Labs  Lab 12/21/17  0413 12/21/17  0808    141   K 3.8  --    *  --    CO2 21*  --    BUN 26*  --    CREATININE 1.0  --    CALCIUM 8.9  --       CBC:      Recent Labs  Lab 12/21/17  0413   WBC 8.37   RBC 3.26*   HGB 9.9*   HCT 30.6*      MCV 94   MCH 30.4   MCHC 32.4      Lipid Panel: No results for input(s): CHOL, LDLCALC, HDL, TRIG in the last 168 hours.  Coagulation:      Recent Labs  Lab 12/17/17  0400 12/17/17  0500   INR 1.1 1.0   APTT 21.7

## 2017-12-23 NOTE — PLAN OF CARE
Problem: Patient Care Overview  Goal: Plan of Care Review  Outcome: Ongoing (interventions implemented as appropriate)  POC reviewed with pt at 0500. Pt unable to verbalize understanding due to trach. No family present to address Questions and concerns or review plan of care.  Bath completed. Mag Replaced. New IV started. No acute events overnight. Pt progressing toward goals. Will continue to monitor. See flowsheets for full assessment and VS info.

## 2017-12-24 PROBLEM — E03.9 HYPOTHYROIDISM: Status: ACTIVE | Noted: 2017-12-24

## 2017-12-24 LAB
ALBUMIN SERPL BCP-MCNC: 2.3 G/DL
ALP SERPL-CCNC: 162 U/L
ALT SERPL W/O P-5'-P-CCNC: 53 U/L
ANION GAP SERPL CALC-SCNC: 8 MMOL/L
AST SERPL-CCNC: 25 U/L
BASOPHILS # BLD AUTO: 0.02 K/UL
BASOPHILS NFR BLD: 0.2 %
BILIRUB SERPL-MCNC: 0.4 MG/DL
BUN SERPL-MCNC: 33 MG/DL
CALCIUM SERPL-MCNC: 8.8 MG/DL
CHLORIDE SERPL-SCNC: 114 MMOL/L
CO2 SERPL-SCNC: 21 MMOL/L
CREAT SERPL-MCNC: 1.1 MG/DL
DIFFERENTIAL METHOD: ABNORMAL
EOSINOPHIL # BLD AUTO: 0.3 K/UL
EOSINOPHIL NFR BLD: 3.3 %
ERYTHROCYTE [DISTWIDTH] IN BLOOD BY AUTOMATED COUNT: 15.9 %
EST. GFR  (AFRICAN AMERICAN): >60 ML/MIN/1.73 M^2
EST. GFR  (NON AFRICAN AMERICAN): >60 ML/MIN/1.73 M^2
GLUCOSE SERPL-MCNC: 97 MG/DL
HCT VFR BLD AUTO: 31.3 %
HGB BLD-MCNC: 10 G/DL
IMM GRANULOCYTES # BLD AUTO: 0.16 K/UL
IMM GRANULOCYTES NFR BLD AUTO: 1.9 %
LYMPHOCYTES # BLD AUTO: 1.3 K/UL
LYMPHOCYTES NFR BLD: 15.2 %
MAGNESIUM SERPL-MCNC: 1.9 MG/DL
MCH RBC QN AUTO: 30 PG
MCHC RBC AUTO-ENTMCNC: 31.9 G/DL
MCV RBC AUTO: 94 FL
MONOCYTES # BLD AUTO: 0.9 K/UL
MONOCYTES NFR BLD: 10.9 %
NEUTROPHILS # BLD AUTO: 5.7 K/UL
NEUTROPHILS NFR BLD: 68.5 %
NRBC BLD-RTO: 0 /100 WBC
PHOSPHATE SERPL-MCNC: 3 MG/DL
PLATELET # BLD AUTO: 183 K/UL
PMV BLD AUTO: 11.3 FL
POCT GLUCOSE: 102 MG/DL (ref 70–110)
POCT GLUCOSE: 113 MG/DL (ref 70–110)
POCT GLUCOSE: 118 MG/DL (ref 70–110)
POCT GLUCOSE: 136 MG/DL (ref 70–110)
POTASSIUM SERPL-SCNC: 4 MMOL/L
PROT SERPL-MCNC: 7.4 G/DL
RBC # BLD AUTO: 3.33 M/UL
SODIUM SERPL-SCNC: 143 MMOL/L
SODIUM SERPL-SCNC: 143 MMOL/L
SODIUM SERPL-SCNC: 144 MMOL/L
WBC # BLD AUTO: 8.28 K/UL

## 2017-12-24 PROCEDURE — 63600175 PHARM REV CODE 636 W HCPCS: Performed by: PHYSICIAN ASSISTANT

## 2017-12-24 PROCEDURE — 80053 COMPREHEN METABOLIC PANEL: CPT

## 2017-12-24 PROCEDURE — 94761 N-INVAS EAR/PLS OXIMETRY MLT: CPT

## 2017-12-24 PROCEDURE — 20000000 HC ICU ROOM

## 2017-12-24 PROCEDURE — 25000003 PHARM REV CODE 250: Performed by: PHYSICIAN ASSISTANT

## 2017-12-24 PROCEDURE — 27100171 HC OXYGEN HIGH FLOW UP TO 24 HOURS

## 2017-12-24 PROCEDURE — 99233 SBSQ HOSP IP/OBS HIGH 50: CPT | Mod: ,,, | Performed by: PHYSICIAN ASSISTANT

## 2017-12-24 PROCEDURE — 99900026 HC AIRWAY MAINTENANCE (STAT)

## 2017-12-24 PROCEDURE — 84100 ASSAY OF PHOSPHORUS: CPT

## 2017-12-24 PROCEDURE — 25000003 PHARM REV CODE 250: Performed by: PSYCHIATRY & NEUROLOGY

## 2017-12-24 PROCEDURE — 84295 ASSAY OF SERUM SODIUM: CPT

## 2017-12-24 PROCEDURE — 85025 COMPLETE CBC W/AUTO DIFF WBC: CPT

## 2017-12-24 PROCEDURE — 25000003 PHARM REV CODE 250: Performed by: NURSE PRACTITIONER

## 2017-12-24 PROCEDURE — 27000221 HC OXYGEN, UP TO 24 HOURS

## 2017-12-24 PROCEDURE — 83735 ASSAY OF MAGNESIUM: CPT

## 2017-12-24 RX ADMIN — MUPIROCIN: 20 OINTMENT TOPICAL at 10:12

## 2017-12-24 RX ADMIN — FLUOXETINE 20 MG: 20 CAPSULE ORAL at 08:12

## 2017-12-24 RX ADMIN — FAMOTIDINE 20 MG: 20 TABLET, FILM COATED ORAL at 08:12

## 2017-12-24 RX ADMIN — ATORVASTATIN CALCIUM 40 MG: 20 TABLET, FILM COATED ORAL at 08:12

## 2017-12-24 RX ADMIN — BETHANECHOL CHLORIDE 10 MG: 10 TABLET ORAL at 10:12

## 2017-12-24 RX ADMIN — BETHANECHOL CHLORIDE 10 MG: 10 TABLET ORAL at 01:12

## 2017-12-24 RX ADMIN — PIPERACILLIN SODIUM AND TAZOBACTAM SODIUM 4.5 G: 4; .5 INJECTION, POWDER, LYOPHILIZED, FOR SOLUTION INTRAVENOUS at 02:12

## 2017-12-24 RX ADMIN — TAMSULOSIN HYDROCHLORIDE 0.4 MG: 0.4 CAPSULE ORAL at 10:12

## 2017-12-24 RX ADMIN — BETHANECHOL CHLORIDE 10 MG: 10 TABLET ORAL at 06:12

## 2017-12-24 RX ADMIN — DANTROLENE SODIUM 25 MG: 25 CAPSULE ORAL at 10:12

## 2017-12-24 RX ADMIN — DONEPEZIL HYDROCHLORIDE 5 MG: 5 TABLET, FILM COATED ORAL at 09:12

## 2017-12-24 RX ADMIN — DANTROLENE SODIUM 25 MG: 25 CAPSULE ORAL at 01:12

## 2017-12-24 RX ADMIN — VITAMIN C 1 TABLET: TAB at 08:12

## 2017-12-24 RX ADMIN — ENOXAPARIN SODIUM 40 MG: 100 INJECTION SUBCUTANEOUS at 04:12

## 2017-12-24 RX ADMIN — FINASTERIDE 5 MG: 5 TABLET, FILM COATED ORAL at 08:12

## 2017-12-24 RX ADMIN — LEVETIRACETAM 1000 MG: 100 SOLUTION ORAL at 10:12

## 2017-12-24 RX ADMIN — CHLORHEXIDINE GLUCONATE 15 ML: 1.2 RINSE ORAL at 09:12

## 2017-12-24 RX ADMIN — MINERAL OIL AND WHITE PETROLATUM: 150; 830 OINTMENT OPHTHALMIC at 10:12

## 2017-12-24 RX ADMIN — ASPIRIN 81 MG CHEWABLE TABLET 81 MG: 81 TABLET CHEWABLE at 08:12

## 2017-12-24 RX ADMIN — FAMOTIDINE 20 MG: 20 TABLET, FILM COATED ORAL at 10:12

## 2017-12-24 RX ADMIN — BACLOFEN 10 MG: 10 TABLET ORAL at 10:12

## 2017-12-24 RX ADMIN — MUPIROCIN: 20 OINTMENT TOPICAL at 08:12

## 2017-12-24 RX ADMIN — BACLOFEN 10 MG: 10 TABLET ORAL at 01:12

## 2017-12-24 RX ADMIN — LEVOTHYROXINE SODIUM 75 MCG: 75 TABLET ORAL at 08:12

## 2017-12-24 RX ADMIN — DANTROLENE SODIUM 25 MG: 25 CAPSULE ORAL at 06:12

## 2017-12-24 RX ADMIN — LEVETIRACETAM 1000 MG: 100 SOLUTION ORAL at 08:12

## 2017-12-24 RX ADMIN — CHLORHEXIDINE GLUCONATE 15 ML: 1.2 RINSE ORAL at 10:12

## 2017-12-24 RX ADMIN — TAMSULOSIN HYDROCHLORIDE 0.4 MG: 0.4 CAPSULE ORAL at 08:12

## 2017-12-24 RX ADMIN — BACLOFEN 10 MG: 10 TABLET ORAL at 06:12

## 2017-12-24 NOTE — PLAN OF CARE
Problem: Patient Care Overview  Goal: Plan of Care Review  Outcome: Ongoing (interventions implemented as appropriate)  POC reviewed with pt at 0500. Pt unable to verbalize understanding. No family present to address questions or concerns. No acute events overnight. Bath completed.  Pt progressing toward goals. Will continue to monitor. See flowsheets for full assessment and VS info.

## 2017-12-24 NOTE — PROGRESS NOTES
[facility]  Neurocritical Care  Progress Note    Admit Date: 12/17/2017  Service Date: 12/23/2017  Length of Stay: 6    Subjective:     Chief Complaint: Seizure    History of Present Illness: Mr dickerson is a  is a 67 y.o. 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    who presents to [facility] for evaluation of new onset Seizure activity. He presented to outside ED with seizure activity for approximately 20 minutes. Per EMS, he was diaphoretic on arrival and tachycardic. Per nursing home, pt is nonverbal at baseline, but can focus on the speaker. He has a trach in place. He is being admitted to [facility] for a higher level of care.     Hospital Course: 12/17: admit to [facility], EEG pending, Keppra 1 G  at OSH and 500 Q 12 started, CTH: No acute process   12/18:increase keppra per epilepsy, sputum culture, trend procal  12/19: start RISS, discussed with daughter at bedside, not happy with Noa.  12/20: Pending to wean off the vent. Pending placement. Last EEG : L-PLEDS. The patient is very contracted. S/p trach placement.01/2017.  12/21: Pending to wean off the vent. And placement. Last EEG : L-PLEDS.  Yesterday Vanc was D/C and we left him on Zosyn. He was placed on isolation for E.Coli ESBL. S/P trach / PEG. Rehab consult to assess for Botox  injections for spasticity. Tolerating trach collar.   12/22: No clinical changes. Patient on day 2 of trach collar. ABG OK. Started on Dantrolene and pending to wean Baclofen off..  INTERVAL HISTORY-12/23 - no acute events  Pending placement        Review of Systems: Unable to obtain a complete ROS due to level of consciousness.     Vitals:   Temp: 99.7 °F (37.6 °C)  Pulse: 73  Rhythm: normal sinus rhythm  BP: 131/77  MAP (mmHg): 99  Resp: 14  SpO2: 98 %  Oxygen Concentration (%): 40  O2 Device (Oxygen Therapy): Trach Collar    Temp  Min: 98.2 °F (36.8 °C)  Max: 99.7 °F (37.6 °C)  Pulse  Min: 64  Max:  105  BP  Min: 123/75  Max: 158/90  MAP (mmHg)  Min: 94  Max: 119  Resp  Min: 12  Max: 20  SpO2  Min: 97 %  Max: 100 %  Oxygen Concentration (%)  Min: 40  Max: 40    12/22 0701 - 12/23 0700  In: 1790.8 [I.V.:90.8]  Out: 1420 [Urine:1420]   Unmeasured Output  Stool Occurrence: 0     General   HEENT: s/ptrach. L eye with conjunctival congestion  Chest Heart RRR / Lungs Clear to auscultation  Abdomen: Soft nontender + BS; s/p PEG.  Extremities: OK distal pulses.  Skin: UK  Neurological Exam:  MS; Awake but not following commands.  CN: II-XII UK  Motor: LUE  1-2/5 / RUE 1-2/5   bilaterally bilateral contractures and spasticity             LLE   1-2/5 /  RLE1- 2/5   bilaterally bilateral contractures and spasticity  Sensory: LT/PP/T/ Vibration                  Complex sensory modalities: not tested  DTR:  normal throughout.  Coordination /Fine motor:Limited.  Gait: Not tested.  Meningeal signs: Absent    Medications:   Continuous Scheduled  aspirin 81 mg Daily   atorvastatin 40 mg Daily   B-complex with vitamin C 1 tablet Daily   baclofen 10 mg TID   bethanechol 10 mg Q8H   chlorhexidine 15 mL BID   dantrolene 25 mg Q8H   donepezil 5 mg Daily   enoxparin 40 mg Q24H   famotidine 20 mg BID   finasteride 5 mg Daily   FLUoxetine 20 mg Daily   levetiracetam oral soln 1,000 mg BID   levothyroxine 75 mcg Daily   mupirocin  BID   piperacillin-tazobactam 4.5 g in dextrose 5 % 100 mL IVPB (ready to mix system) 4.5 g Q8H   polyethylene glycol 17 g Daily   senna-docusate 8.6-50 mg 1 tablet Daily   tamsulosin 0.4 mg BID   white petrolatum-mineral oil  QHS   PRN  acetaminophen 650 mg Q4H PRN   dextrose 50% 12.5 g PRN   glucagon (human recombinant) 1 mg PRN   insulin aspart 0-5 Units Q4H PRN   magnesium oxide 800 mg PRN   magnesium oxide 800 mg PRN   ondansetron 4 mg Q8H PRN   potassium chloride 10% 40 mEq PRN   potassium chloride 10% 40 mEq PRN   potassium chloride 10% 60 mEq PRN   potassium, sodium phosphates 2 packet PRN    potassium, sodium phosphates 2 packet PRN   potassium, sodium phosphates 2 packet PRN   sodium chloride 0.9% 3 mL PRN      Today I independently reviewed pertinent medications, lines/drains/airways, lab results,       Assessment/Plan:         Active Problem List:   1.New Onset SZ, status resolved.   2.Dermatomnyositis  3. HTN  4. Hx of L ICA stenosis and stroke  5. Pneumonia and UTI.  6. Spasticity and contractures on all 4 limbs.  7. Ventilatory failure s/p trach,  8. 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     Assessment / Plan:     Neuro:  -Keppra 1gr bid  -Donepezil 5mg qd  -ECASA 81 mg qd  -Lipitor 40mg qd  -Tylenol PRN  -PT/OT  -Multipodous  boots and splints  -Baclofen 10 mg tid. Decrease and taper.   -Dantrolene 25mg q 8hrs. In 1 week increase to 50mg q 8hrs.  Resp:  -Trach collar.  -Recruitment maneuvers q 6hrs  -ABG today.  -Lacrilube drops q 6hrs  CV: Keep SBP <=160 mmHg. BP on the lower side.   No antihypertensives.  IVF/nutrition/Renal/GI:  -NS  D/C    -TF at goal.  -NS  flushes 100cc q 4hrs  -Bladder scan  q 6hrs x 24hrs  -Serum Na q 12hrs  -BR  Hem / ID: UTI and pneumonia in TX ESBL, Proteuns and providence and ESBL in urine. WBC normalized,  -Zosyn x 7 days,  -Contact isolation  Endo:  -SSI q 6hrs  -Lipitor 40mg qd  -Levothyroxine 0.075mg qd  Prophylaxis:  -Enoxiparin SC 40mg qd  -Famotidine.  Advance Directives and Disposition:    Full Code. Pending LTAC / SNF  placement and keep in isolation. Consider transfer to the SDU on Monday. Consider outpatient follow-up for Botox evaluation.           Uninterrupted level 3  Follow-up /Counseling Time (not including procedures):  = 35 min                   Activity Orders          Ambulate with assistance starting at 12/17 0447        Prior    Ruth Shelley PA-C  Neurocritical Care  [facility]

## 2017-12-25 PROBLEM — R33.9 URINARY RETENTION: Status: ACTIVE | Noted: 2017-12-25

## 2017-12-25 LAB
ALBUMIN SERPL BCP-MCNC: 2.4 G/DL
ALP SERPL-CCNC: 162 U/L
ALT SERPL W/O P-5'-P-CCNC: 45 U/L
ANION GAP SERPL CALC-SCNC: 7 MMOL/L
AST SERPL-CCNC: 27 U/L
BASOPHILS # BLD AUTO: 0.02 K/UL
BASOPHILS NFR BLD: 0.2 %
BILIRUB SERPL-MCNC: 0.4 MG/DL
BUN SERPL-MCNC: 37 MG/DL
CALCIUM SERPL-MCNC: 8.8 MG/DL
CHLORIDE SERPL-SCNC: 114 MMOL/L
CO2 SERPL-SCNC: 21 MMOL/L
CREAT SERPL-MCNC: 1.1 MG/DL
DIFFERENTIAL METHOD: ABNORMAL
EOSINOPHIL # BLD AUTO: 0.3 K/UL
EOSINOPHIL NFR BLD: 3.4 %
ERYTHROCYTE [DISTWIDTH] IN BLOOD BY AUTOMATED COUNT: 15.9 %
EST. GFR  (AFRICAN AMERICAN): >60 ML/MIN/1.73 M^2
EST. GFR  (NON AFRICAN AMERICAN): >60 ML/MIN/1.73 M^2
GLUCOSE SERPL-MCNC: 94 MG/DL
HCT VFR BLD AUTO: 31.1 %
HGB BLD-MCNC: 10 G/DL
IMM GRANULOCYTES # BLD AUTO: 0.21 K/UL
IMM GRANULOCYTES NFR BLD AUTO: 2.3 %
LYMPHOCYTES # BLD AUTO: 1.9 K/UL
LYMPHOCYTES NFR BLD: 21 %
MAGNESIUM SERPL-MCNC: 2.2 MG/DL
MCH RBC QN AUTO: 31.3 PG
MCHC RBC AUTO-ENTMCNC: 32.2 G/DL
MCV RBC AUTO: 97 FL
MONOCYTES # BLD AUTO: 0.9 K/UL
MONOCYTES NFR BLD: 9.2 %
NEUTROPHILS # BLD AUTO: 5.9 K/UL
NEUTROPHILS NFR BLD: 63.9 %
NRBC BLD-RTO: 0 /100 WBC
PHOSPHATE SERPL-MCNC: 3.5 MG/DL
PLATELET # BLD AUTO: 186 K/UL
PMV BLD AUTO: 11.6 FL
POCT GLUCOSE: 104 MG/DL (ref 70–110)
POCT GLUCOSE: 115 MG/DL (ref 70–110)
POCT GLUCOSE: 80 MG/DL (ref 70–110)
POTASSIUM SERPL-SCNC: 3.9 MMOL/L
PROT SERPL-MCNC: 7.7 G/DL
RBC # BLD AUTO: 3.2 M/UL
SODIUM SERPL-SCNC: 142 MMOL/L
SODIUM SERPL-SCNC: 143 MMOL/L
SODIUM SERPL-SCNC: 144 MMOL/L
WBC # BLD AUTO: 9.19 K/UL

## 2017-12-25 PROCEDURE — 25000003 PHARM REV CODE 250: Performed by: PSYCHIATRY & NEUROLOGY

## 2017-12-25 PROCEDURE — 94761 N-INVAS EAR/PLS OXIMETRY MLT: CPT

## 2017-12-25 PROCEDURE — 63600175 PHARM REV CODE 636 W HCPCS: Performed by: PHYSICIAN ASSISTANT

## 2017-12-25 PROCEDURE — 27000221 HC OXYGEN, UP TO 24 HOURS

## 2017-12-25 PROCEDURE — 84100 ASSAY OF PHOSPHORUS: CPT

## 2017-12-25 PROCEDURE — 20000000 HC ICU ROOM

## 2017-12-25 PROCEDURE — 84295 ASSAY OF SERUM SODIUM: CPT | Mod: 91

## 2017-12-25 PROCEDURE — 85025 COMPLETE CBC W/AUTO DIFF WBC: CPT

## 2017-12-25 PROCEDURE — 25000003 PHARM REV CODE 250: Performed by: PHYSICIAN ASSISTANT

## 2017-12-25 PROCEDURE — 83735 ASSAY OF MAGNESIUM: CPT

## 2017-12-25 PROCEDURE — 99900026 HC AIRWAY MAINTENANCE (STAT)

## 2017-12-25 PROCEDURE — 80053 COMPREHEN METABOLIC PANEL: CPT

## 2017-12-25 PROCEDURE — 25000003 PHARM REV CODE 250: Performed by: NURSE PRACTITIONER

## 2017-12-25 PROCEDURE — 99233 SBSQ HOSP IP/OBS HIGH 50: CPT | Mod: ,,, | Performed by: NURSE PRACTITIONER

## 2017-12-25 RX ADMIN — LEVETIRACETAM 1000 MG: 100 SOLUTION ORAL at 08:12

## 2017-12-25 RX ADMIN — DANTROLENE SODIUM 25 MG: 25 CAPSULE ORAL at 09:12

## 2017-12-25 RX ADMIN — TAMSULOSIN HYDROCHLORIDE 0.4 MG: 0.4 CAPSULE ORAL at 08:12

## 2017-12-25 RX ADMIN — MINERAL OIL AND WHITE PETROLATUM: 150; 830 OINTMENT OPHTHALMIC at 09:12

## 2017-12-25 RX ADMIN — STANDARDIZED SENNA CONCENTRATE AND DOCUSATE SODIUM 1 TABLET: 8.6; 5 TABLET, FILM COATED ORAL at 08:12

## 2017-12-25 RX ADMIN — FINASTERIDE 5 MG: 5 TABLET, FILM COATED ORAL at 08:12

## 2017-12-25 RX ADMIN — BETHANECHOL CHLORIDE 10 MG: 10 TABLET ORAL at 09:12

## 2017-12-25 RX ADMIN — MUPIROCIN: 20 OINTMENT TOPICAL at 08:12

## 2017-12-25 RX ADMIN — CHLORHEXIDINE GLUCONATE 15 ML: 1.2 RINSE ORAL at 12:12

## 2017-12-25 RX ADMIN — BETHANECHOL CHLORIDE 10 MG: 10 TABLET ORAL at 07:12

## 2017-12-25 RX ADMIN — BACLOFEN 5 MG: 10 TABLET ORAL at 09:12

## 2017-12-25 RX ADMIN — FLUOXETINE 20 MG: 20 CAPSULE ORAL at 08:12

## 2017-12-25 RX ADMIN — ASPIRIN 81 MG CHEWABLE TABLET 81 MG: 81 TABLET CHEWABLE at 08:12

## 2017-12-25 RX ADMIN — FAMOTIDINE 20 MG: 20 TABLET, FILM COATED ORAL at 08:12

## 2017-12-25 RX ADMIN — DONEPEZIL HYDROCHLORIDE 5 MG: 5 TABLET, FILM COATED ORAL at 01:12

## 2017-12-25 RX ADMIN — LEVOTHYROXINE SODIUM 75 MCG: 75 TABLET ORAL at 08:12

## 2017-12-25 RX ADMIN — POLYETHYLENE GLYCOL 3350 17 G: 17 POWDER, FOR SOLUTION ORAL at 08:12

## 2017-12-25 RX ADMIN — ENOXAPARIN SODIUM 40 MG: 100 INJECTION SUBCUTANEOUS at 05:12

## 2017-12-25 RX ADMIN — BACLOFEN 10 MG: 10 TABLET ORAL at 07:12

## 2017-12-25 RX ADMIN — ATORVASTATIN CALCIUM 40 MG: 20 TABLET, FILM COATED ORAL at 08:12

## 2017-12-25 RX ADMIN — BACLOFEN 10 MG: 10 TABLET ORAL at 01:12

## 2017-12-25 RX ADMIN — DANTROLENE SODIUM 25 MG: 25 CAPSULE ORAL at 01:12

## 2017-12-25 RX ADMIN — VITAMIN C 1 TABLET: TAB at 08:12

## 2017-12-25 RX ADMIN — BETHANECHOL CHLORIDE 10 MG: 10 TABLET ORAL at 01:12

## 2017-12-25 NOTE — PLAN OF CARE
Problem: Patient Care Overview  Goal: Plan of Care Review  Outcome: Ongoing (interventions implemented as appropriate)  POC reviewed with pt at 0500. Pt unable to verbalize understanding. Questions and concerns addressed. No acute events overnight. Pt progressing toward goals. Will continue to monitor. See flowsheets for full assessment and VS info

## 2017-12-25 NOTE — SUBJECTIVE & OBJECTIVE
Interval History:    Review of Systems 12/24 Pt continues to be stable. No significant events today. Will continue to monitor closely    Unable to obtain a complete ROS due to level of consciousness.  Objective:     Vitals:  Temp: 99.1 °F (37.3 °C) (12/24/17 1700)  Pulse: 68 (12/24/17 1700)  Resp: 14 (12/24/17 1700)  BP: 131/76 (12/24/17 1700)  SpO2: 96 % (12/24/17 1700)    Temp:  [98.6 °F (37 °C)-100.2 °F (37.9 °C)] 99.1 °F (37.3 °C)  Pulse:  [64-87] 68  Resp:  [13-24] 14  SpO2:  [93 %-100 %] 96 %  BP: (106-176)/(66-95) 131/76         Oxygen Concentration (%):  [40-42] 40    12/23 0701 - 12/24 0700  In: 2110 [I.V.:110]  Out: 900 [Urine:900]    Physical Exam  Physical Exam:  GA:Trach/PEG Awake, Encephalopathic, Eyes open, Does not follow commands, Contracted thru all extremities U> L, Non-verbal comfortable, no acute distress  HEENT: No scleral icterus or JVD.   Pulmonary: Clear to auscultation Anterior. No wheezing, crackles, or rhonchi.  Cardiac: RRR S1 & S2 w/o rubs/murmurs/gallops.   Abdominal: Bowel sounds present x 4. No appreciable hepatosplenomegaly.  Skin: No jaundice, rashes, or visible lesions.  Neuro:  --GCS: E4 VT M4  --Mental Status: Trach /PEG Awake, Encephalopathic, Eyes open, Does not follow commands, Contracted thru all extremities U> L, Non-Verbal  --CN II-XII grossly intact.   --Pupils 4mm, PERRL.   --Corneal reflex, gag, cough intact.  -- Spasticity and contraction in Bilateral UE & LE    Unable to test gait due to level of consciousness.    Medications:  Continuous Scheduled  aspirin 81 mg Daily   atorvastatin 40 mg Daily   B-complex with vitamin C 1 tablet Daily   baclofen 10 mg TID   bethanechol 10 mg Q8H   chlorhexidine 15 mL BID   dantrolene 25 mg Q8H   donepezil 5 mg Daily   enoxparin 40 mg Q24H   famotidine 20 mg BID   finasteride 5 mg Daily   FLUoxetine 20 mg Daily   levetiracetam oral soln 1,000 mg BID   levothyroxine 75 mcg Daily   mupirocin  BID   polyethylene glycol 17 g Daily    senna-docusate 8.6-50 mg 1 tablet Daily   tamsulosin 0.4 mg BID   white petrolatum-mineral oil  QHS   PRN  acetaminophen 650 mg Q4H PRN   dextrose 50% 12.5 g PRN   glucagon (human recombinant) 1 mg PRN   insulin aspart 0-5 Units Q4H PRN   magnesium oxide 800 mg PRN   magnesium oxide 800 mg PRN   ondansetron 4 mg Q8H PRN   potassium chloride 10% 40 mEq PRN   potassium chloride 10% 40 mEq PRN   potassium chloride 10% 60 mEq PRN   potassium, sodium phosphates 2 packet PRN   potassium, sodium phosphates 2 packet PRN   potassium, sodium phosphates 2 packet PRN   sodium chloride 0.9% 3 mL PRN     Today I personally reviewed pertinent medications, lab results, notably:

## 2017-12-25 NOTE — PROGRESS NOTES
[facility]  Neurocritical Care  Progress Note    Admit Date: 12/17/2017  Service Date: 12/24/2017  Length of Stay: 7    Subjective:     Chief Complaint: Seizure    History of Present Illness: Mr dickerson is a  is a 67 y.o. 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    who presents to [facility] for evaluation of new onset Seizure activity. He presented to outside ED with seizure activity for approximately 20 minutes. Per EMS, he was diaphoretic on arrival and tachycardic. Per nursing home, pt is nonverbal at baseline, but can focus on the speaker. He has a trach in place. He is being admitted to [facility] for a higher level of care.     Hospital Course: 12/17: admit to [facility], EEG pending, Keppra 1 G  at OSH and 500 Q 12 started, CTH: No acute process   12/18:increase keppra per epilepsy, sputum culture, trend procal  12/19: start RISS, discussed with daughter at bedside, not happy with Noa.  12/20: Pending to wean off the vent. Pending placement. Last EEG : L-PLEDS. The patient is very contracted. S/p trach placement.01/2017.  12/21: Pending to wean off the vent. And placement. Last EEG : L-PLEDS.  Yesterday Vanc was D/C and we left him on Zosyn. He was placed on isolation for E.Coli ESBL. S/P trach / PEG. Rehab consult to assess for Botox  injections for spasticity. Tolerating trach collar.   12/22: No clinical changes. Patient on day 2 of trach collar. ABG OK. Started on Dantrolene and pending to wean Baclofen off..  12/23 - no acute events  12/24 Pt continues to be stable. No significant events today. Will continue to monitor closely    Interval History:    Review of Systems 12/24 Pt continues to be stable. No significant events today. Will continue to monitor closely    Unable to obtain a complete ROS due to level of consciousness.  Objective:     Vitals:  Temp: 99.1 °F (37.3 °C) (12/24/17 1700)  Pulse: 68 (12/24/17 1700)  Resp: 14  (12/24/17 1700)  BP: 131/76 (12/24/17 1700)  SpO2: 96 % (12/24/17 1700)    Temp:  [98.6 °F (37 °C)-100.2 °F (37.9 °C)] 99.1 °F (37.3 °C)  Pulse:  [64-87] 68  Resp:  [13-24] 14  SpO2:  [93 %-100 %] 96 %  BP: (106-176)/(66-95) 131/76         Oxygen Concentration (%):  [40-42] 40    12/23 0701 - 12/24 0700  In: 2110 [I.V.:110]  Out: 900 [Urine:900]    Physical Exam  Physical Exam:  GA:Trach/PEG Awake, Encephalopathic, Eyes open, Does not follow commands, Contracted thru all extremities U> L, Non-verbal comfortable, no acute distress  HEENT: No scleral icterus or JVD.   Pulmonary: Clear to auscultation Anterior. No wheezing, crackles, or rhonchi.  Cardiac: RRR S1 & S2 w/o rubs/murmurs/gallops.   Abdominal: Bowel sounds present x 4. No appreciable hepatosplenomegaly.  Skin: No jaundice, rashes, or visible lesions.  Neuro:  --GCS: E4 VT M4  --Mental Status: Trach /PEG Awake, Encephalopathic, Eyes open, Does not follow commands, Contracted thru all extremities U> L, Non-Verbal  --CN II-XII grossly intact.   --Pupils 4mm, PERRL.   --Corneal reflex, gag, cough intact.  -- Spasticity and contraction in Bilateral UE & LE    Unable to test gait due to level of consciousness.    Medications:  Continuous Scheduled  aspirin 81 mg Daily   atorvastatin 40 mg Daily   B-complex with vitamin C 1 tablet Daily   baclofen 10 mg TID   bethanechol 10 mg Q8H   chlorhexidine 15 mL BID   dantrolene 25 mg Q8H   donepezil 5 mg Daily   enoxparin 40 mg Q24H   famotidine 20 mg BID   finasteride 5 mg Daily   FLUoxetine 20 mg Daily   levetiracetam oral soln 1,000 mg BID   levothyroxine 75 mcg Daily   mupirocin  BID   polyethylene glycol 17 g Daily   senna-docusate 8.6-50 mg 1 tablet Daily   tamsulosin 0.4 mg BID   white petrolatum-mineral oil  QHS   PRN  acetaminophen 650 mg Q4H PRN   dextrose 50% 12.5 g PRN   glucagon (human recombinant) 1 mg PRN   insulin aspart 0-5 Units Q4H PRN   magnesium oxide 800 mg PRN   magnesium oxide 800 mg PRN    ondansetron 4 mg Q8H PRN   potassium chloride 10% 40 mEq PRN   potassium chloride 10% 40 mEq PRN   potassium chloride 10% 60 mEq PRN   potassium, sodium phosphates 2 packet PRN   potassium, sodium phosphates 2 packet PRN   potassium, sodium phosphates 2 packet PRN   sodium chloride 0.9% 3 mL PRN     Today I personally reviewed pertinent medications, lab results, notably:          Assessment/Plan:     Neuro   * Seizure    - epilepsy following  -keppra  1g BID        Toxic encephalopathy    - secondary to pneumonia, uti, dehydration        Aphasia    - Hx  prior Stroke        Pulmonary   Pneumonia due to infectious organism    POA  Continue vanc and zosyn  Chest PT  Suctioning          Cardiac/Vascular   Essential hypertension    - SBP < 180   - PRN Labetalol         Renal/   Acute cystitis without hematuria    POA  Continue vanc and zosyn  Condom cath        CKD (chronic kidney disease) stage 3, GFR 30-59 ml/min    - Monitor labs   Monitor I/Os        Endocrine   Hypothyroidism    -- 75 mcg daily         GI   Dysphagia    - NPO  -  meds per peg   -tube feeds        Other   Spasticity    --continue Baclofen 10 mg TID             Prophylaxis:  Venous Thromboembolism: chemical  Stress Ulcer: PPI  Ventilator Pneumonia: yes     Activity Orders          Ambulate with assistance starting at 12/17 1981        Prior    Sosa Zaman PA-C  Neurocritical Care  [facility]

## 2017-12-25 NOTE — PROGRESS NOTES
[facility]  Neurocritical Care  Progress Note    Admit Date: 12/17/2017  Service Date: 12/25/2017  Length of Stay: 8    Subjective:     Chief Complaint: Seizure    History of Present Illness: Mr dickerson is a  is a 67 y.o. 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    who presents to [facility] for evaluation of new onset Seizure activity. He presented to outside ED with seizure activity for approximately 20 minutes. Per EMS, he was diaphoretic on arrival and tachycardic. Per nursing home, pt is nonverbal at baseline, but can focus on the speaker. He has a trach in place. He is being admitted to [facility] for a higher level of care.     Hospital Course: 12/17: admit to [facility], EEG pending, Keppra 1 G  at OSH and 500 Q 12 started, CTH: No acute process   12/18:increase keppra per epilepsy, sputum culture, trend procal  12/19: start RISS, discussed with daughter at bedside, not happy with Noa.  12/20: Pending to wean off the vent. Pending placement. Last EEG : L-PLEDS. The patient is very contracted. S/p trach placement.01/2017.  12/21: Pending to wean off the vent. And placement. Last EEG : L-PLEDS.  Yesterday Vanc was D/C and we left him on Zosyn. He was placed on isolation for E.Coli ESBL. S/P trach / PEG. Rehab consult to assess for Botox  injections for spasticity. Tolerating trach collar.   12/22: No clinical changes. Patient on day 2 of trach collar. ABG OK. Started on Dantrolene and pending to wean Baclofen off..  12/23 - no acute events  12/24 Pt continues to be stable. No significant events today. Will continue to monitor closely  12/25: Pending Ltac/Snf transfer on 12/26. If unabel ot place, will step down to internal Medicine.    Interval History:   -Pending Ltac/SNF, will transfer to IM if unable ot place by tomorrow as no NCC needs at preent time.   -Baclofen weaned while on dantrolene  enteral water increased, bladder scan to  eval increasing BUN        Review of Systems   Unable to perform ROS: Patient nonverbal     Objective:     Vitals:  Temp: 99.7 °F (37.6 °C) (12/25/17 1100)  Pulse: 101 (12/25/17 1200)  Resp: (!) 22 (12/25/17 1200)  BP: (!) 135/91 (12/25/17 1200)  SpO2: (!) 91 % (12/25/17 1200)    Temp:  [98.6 °F (37 °C)-99.7 °F (37.6 °C)] 99.7 °F (37.6 °C)  Pulse:  [] 101  Resp:  [14-22] 22  SpO2:  [91 %-99 %] 91 %  BP: (116-177)/() 135/91         Oxygen Concentration (%):  [40] 40    12/24 0701 - 12/25 0700  In: 1310 [I.V.:50]  Out: 350 [Urine:350]    Physical Exam   Cardiovascular: Normal rate, regular rhythm, normal heart sounds and intact distal pulses.    Pulmonary/Chest: Effort normal and breath sounds normal.   Abdominal: Soft. Bowel sounds are normal.   Neurological:   E4 V1 M3  Awake, attends at times  Nonverbal  Minimal response/appears to be withdrawal RUE/LUE and RLE  No response to pain LLE   Skin: Skin is warm.   Vitals reviewed.    .    Medications:  Continuous Scheduled  aspirin 81 mg Daily   atorvastatin 40 mg Daily   B-complex with vitamin C 1 tablet Daily   baclofen 10 mg TID   bethanechol 10 mg Q8H   chlorhexidine 15 mL BID   dantrolene 25 mg Q8H   donepezil 5 mg Daily   enoxparin 40 mg Q24H   famotidine 20 mg BID   finasteride 5 mg Daily   FLUoxetine 20 mg Daily   levetiracetam oral soln 1,000 mg BID   levothyroxine 75 mcg Daily   mupirocin  BID   polyethylene glycol 17 g Daily   senna-docusate 8.6-50 mg 1 tablet Daily   tamsulosin 0.4 mg BID   white petrolatum-mineral oil  QHS   PRN  acetaminophen 650 mg Q4H PRN   dextrose 50% 12.5 g PRN   glucagon (human recombinant) 1 mg PRN   insulin aspart 0-5 Units Q4H PRN   magnesium oxide 800 mg PRN   magnesium oxide 800 mg PRN   ondansetron 4 mg Q8H PRN   potassium chloride 10% 40 mEq PRN   potassium chloride 10% 40 mEq PRN   potassium chloride 10% 60 mEq PRN   potassium, sodium phosphates 2 packet PRN   potassium, sodium phosphates 2 packet PRN   potassium,  sodium phosphates 2 packet PRN   sodium chloride 0.9% 3 mL PRN     Today I personally reviewed pertinent medications, lines/drains/airways, imaging, cardiology, lab results, microbiology results,       Assessment/Plan:     Neuro   * Seizure    - epilepsy following  -keppra  1g BID  -No epileptiform activity 12/17        Toxic encephalopathy    - secondary to pneumonia, uti, dehydration        Aphasia    - Hx  prior Stroke  TF        Pulmonary   Pneumonia due to infectious organism    POA  Zosyn course completed  WBC.Fever improved  Chest PT  Suctioning          Cardiac/Vascular   Essential hypertension    - SBP < 180   - PRN Labetalol         Renal/   Urinary retention    Continue Proscar/bethanecol        Acute cystitis without hematuria    POA  Continue vanc and zosyn  Condom cath        CKD (chronic kidney disease) stage 3, GFR 30-59 ml/min    - Monitor labs   Monitor I/Os        Endocrine   Hypothyroidism    -- 75 synthroid mcg daily         GI   Dysphagia    - NPO  -  meds per peg   -tube feeds        Other   Spasticity    --wean baclofen to 5 tid  --Cont. Dantroline            Prophylaxis:  Venous Thromboembolism: mechanical chemical  Stress Ulcer: H2B  Ventilator Pneumonia: yes     Activity Orders          Ambulate with assistance starting at 12/17 0447        Prior   Level 3    Clint Gray NP  Neurocritical Care  [facility]

## 2017-12-25 NOTE — SUBJECTIVE & OBJECTIVE
Interval History:   -Pending Ltac/SNF, will transfer to IM if unable ot place by tomorrow as no NCC needs at preent time.           Review of Systems   Unable to perform ROS: Patient nonverbal     Objective:     Vitals:  Temp: 99.7 °F (37.6 °C) (12/25/17 1100)  Pulse: 101 (12/25/17 1200)  Resp: (!) 22 (12/25/17 1200)  BP: (!) 135/91 (12/25/17 1200)  SpO2: (!) 91 % (12/25/17 1200)    Temp:  [98.6 °F (37 °C)-99.7 °F (37.6 °C)] 99.7 °F (37.6 °C)  Pulse:  [] 101  Resp:  [14-22] 22  SpO2:  [91 %-99 %] 91 %  BP: (116-177)/() 135/91         Oxygen Concentration (%):  [40] 40    12/24 0701 - 12/25 0700  In: 1310 [I.V.:50]  Out: 350 [Urine:350]    Physical Exam   Cardiovascular: Normal rate, regular rhythm, normal heart sounds and intact distal pulses.    Pulmonary/Chest: Effort normal and breath sounds normal.   Abdominal: Soft. Bowel sounds are normal.   Neurological:   E4 V1 M3  Awake, attends at times  Nonverbal  Minimal response/appears to be withdrawal RUE/LUE and RLE  No response to pain LLE   Skin: Skin is warm.   Vitals reviewed.    .    Medications:  Continuous Scheduled  aspirin 81 mg Daily   atorvastatin 40 mg Daily   B-complex with vitamin C 1 tablet Daily   baclofen 10 mg TID   bethanechol 10 mg Q8H   chlorhexidine 15 mL BID   dantrolene 25 mg Q8H   donepezil 5 mg Daily   enoxparin 40 mg Q24H   famotidine 20 mg BID   finasteride 5 mg Daily   FLUoxetine 20 mg Daily   levetiracetam oral soln 1,000 mg BID   levothyroxine 75 mcg Daily   mupirocin  BID   polyethylene glycol 17 g Daily   senna-docusate 8.6-50 mg 1 tablet Daily   tamsulosin 0.4 mg BID   white petrolatum-mineral oil  QHS   PRN  acetaminophen 650 mg Q4H PRN   dextrose 50% 12.5 g PRN   glucagon (human recombinant) 1 mg PRN   insulin aspart 0-5 Units Q4H PRN   magnesium oxide 800 mg PRN   magnesium oxide 800 mg PRN   ondansetron 4 mg Q8H PRN   potassium chloride 10% 40 mEq PRN   potassium chloride 10% 40 mEq PRN   potassium chloride 10% 60 mEq  PRN   potassium, sodium phosphates 2 packet PRN   potassium, sodium phosphates 2 packet PRN   potassium, sodium phosphates 2 packet PRN   sodium chloride 0.9% 3 mL PRN     Today I personally reviewed pertinent medications, lines/drains/airways, imaging, cardiology, lab results, microbiology results,

## 2017-12-26 LAB
ALBUMIN SERPL BCP-MCNC: 2.3 G/DL
ALP SERPL-CCNC: 148 U/L
ALT SERPL W/O P-5'-P-CCNC: 45 U/L
ANION GAP SERPL CALC-SCNC: 8 MMOL/L
AST SERPL-CCNC: 28 U/L
BASOPHILS # BLD AUTO: 0.02 K/UL
BASOPHILS NFR BLD: 0.2 %
BILIRUB SERPL-MCNC: 0.3 MG/DL
BUN SERPL-MCNC: 38 MG/DL
CALCIUM SERPL-MCNC: 8.8 MG/DL
CHLORIDE SERPL-SCNC: 113 MMOL/L
CO2 SERPL-SCNC: 20 MMOL/L
CREAT SERPL-MCNC: 1.1 MG/DL
DIFFERENTIAL METHOD: ABNORMAL
EOSINOPHIL # BLD AUTO: 0.3 K/UL
EOSINOPHIL NFR BLD: 3.1 %
ERYTHROCYTE [DISTWIDTH] IN BLOOD BY AUTOMATED COUNT: 16 %
EST. GFR  (AFRICAN AMERICAN): >60 ML/MIN/1.73 M^2
EST. GFR  (NON AFRICAN AMERICAN): >60 ML/MIN/1.73 M^2
GLUCOSE SERPL-MCNC: 128 MG/DL
HCT VFR BLD AUTO: 33 %
HGB BLD-MCNC: 10.4 G/DL
IMM GRANULOCYTES # BLD AUTO: 0.17 K/UL
IMM GRANULOCYTES NFR BLD AUTO: 1.7 %
LYMPHOCYTES # BLD AUTO: 1.4 K/UL
LYMPHOCYTES NFR BLD: 13.8 %
MAGNESIUM SERPL-MCNC: 1.9 MG/DL
MCH RBC QN AUTO: 31 PG
MCHC RBC AUTO-ENTMCNC: 31.5 G/DL
MCV RBC AUTO: 99 FL
MONOCYTES # BLD AUTO: 0.8 K/UL
MONOCYTES NFR BLD: 8.1 %
NEUTROPHILS # BLD AUTO: 7.2 K/UL
NEUTROPHILS NFR BLD: 73.1 %
NRBC BLD-RTO: 0 /100 WBC
PHOSPHATE SERPL-MCNC: 3 MG/DL
PLATELET # BLD AUTO: 195 K/UL
PMV BLD AUTO: 11.7 FL
POCT GLUCOSE: 104 MG/DL (ref 70–110)
POCT GLUCOSE: 99 MG/DL (ref 70–110)
POTASSIUM SERPL-SCNC: 3.8 MMOL/L
PROT SERPL-MCNC: 7.5 G/DL
RBC # BLD AUTO: 3.35 M/UL
SODIUM SERPL-SCNC: 141 MMOL/L
SODIUM SERPL-SCNC: 141 MMOL/L
SODIUM SERPL-SCNC: 143 MMOL/L
WBC # BLD AUTO: 9.87 K/UL

## 2017-12-26 PROCEDURE — 97530 THERAPEUTIC ACTIVITIES: CPT

## 2017-12-26 PROCEDURE — 25000003 PHARM REV CODE 250: Performed by: PSYCHIATRY & NEUROLOGY

## 2017-12-26 PROCEDURE — 84100 ASSAY OF PHOSPHORUS: CPT

## 2017-12-26 PROCEDURE — 25000003 PHARM REV CODE 250: Performed by: NURSE PRACTITIONER

## 2017-12-26 PROCEDURE — 94761 N-INVAS EAR/PLS OXIMETRY MLT: CPT

## 2017-12-26 PROCEDURE — 99900026 HC AIRWAY MAINTENANCE (STAT)

## 2017-12-26 PROCEDURE — 25000003 PHARM REV CODE 250: Performed by: PHYSICIAN ASSISTANT

## 2017-12-26 PROCEDURE — 20000000 HC ICU ROOM

## 2017-12-26 PROCEDURE — 80053 COMPREHEN METABOLIC PANEL: CPT

## 2017-12-26 PROCEDURE — 83735 ASSAY OF MAGNESIUM: CPT

## 2017-12-26 PROCEDURE — 63600175 PHARM REV CODE 636 W HCPCS: Performed by: PHYSICIAN ASSISTANT

## 2017-12-26 PROCEDURE — 99232 SBSQ HOSP IP/OBS MODERATE 35: CPT | Mod: ,,, | Performed by: PHYSICIAN ASSISTANT

## 2017-12-26 PROCEDURE — 85025 COMPLETE CBC W/AUTO DIFF WBC: CPT

## 2017-12-26 PROCEDURE — 27000221 HC OXYGEN, UP TO 24 HOURS

## 2017-12-26 PROCEDURE — A4216 STERILE WATER/SALINE, 10 ML: HCPCS | Performed by: NURSE PRACTITIONER

## 2017-12-26 PROCEDURE — 84295 ASSAY OF SERUM SODIUM: CPT

## 2017-12-26 RX ADMIN — LEVOTHYROXINE SODIUM 75 MCG: 75 TABLET ORAL at 08:12

## 2017-12-26 RX ADMIN — TAMSULOSIN HYDROCHLORIDE 0.4 MG: 0.4 CAPSULE ORAL at 10:12

## 2017-12-26 RX ADMIN — ATORVASTATIN CALCIUM 40 MG: 20 TABLET, FILM COATED ORAL at 08:12

## 2017-12-26 RX ADMIN — BACLOFEN 5 MG: 10 TABLET ORAL at 02:12

## 2017-12-26 RX ADMIN — BACLOFEN 5 MG: 10 TABLET ORAL at 06:12

## 2017-12-26 RX ADMIN — ENOXAPARIN SODIUM 40 MG: 100 INJECTION SUBCUTANEOUS at 05:12

## 2017-12-26 RX ADMIN — BETHANECHOL CHLORIDE 10 MG: 10 TABLET ORAL at 06:12

## 2017-12-26 RX ADMIN — BETHANECHOL CHLORIDE 10 MG: 10 TABLET ORAL at 02:12

## 2017-12-26 RX ADMIN — FAMOTIDINE 20 MG: 20 TABLET, FILM COATED ORAL at 08:12

## 2017-12-26 RX ADMIN — MUPIROCIN: 20 OINTMENT TOPICAL at 10:12

## 2017-12-26 RX ADMIN — FAMOTIDINE 20 MG: 20 TABLET, FILM COATED ORAL at 10:12

## 2017-12-26 RX ADMIN — LEVETIRACETAM 1000 MG: 100 SOLUTION ORAL at 08:12

## 2017-12-26 RX ADMIN — DONEPEZIL HYDROCHLORIDE 5 MG: 5 TABLET, FILM COATED ORAL at 08:12

## 2017-12-26 RX ADMIN — STANDARDIZED SENNA CONCENTRATE AND DOCUSATE SODIUM 1 TABLET: 8.6; 5 TABLET, FILM COATED ORAL at 08:12

## 2017-12-26 RX ADMIN — DANTROLENE SODIUM 25 MG: 25 CAPSULE ORAL at 10:12

## 2017-12-26 RX ADMIN — DANTROLENE SODIUM 25 MG: 25 CAPSULE ORAL at 02:12

## 2017-12-26 RX ADMIN — POLYETHYLENE GLYCOL 3350 17 G: 17 POWDER, FOR SOLUTION ORAL at 08:12

## 2017-12-26 RX ADMIN — DANTROLENE SODIUM 25 MG: 25 CAPSULE ORAL at 06:12

## 2017-12-26 RX ADMIN — BACLOFEN 5 MG: 10 TABLET ORAL at 10:12

## 2017-12-26 RX ADMIN — VITAMIN C 1 TABLET: TAB at 08:12

## 2017-12-26 RX ADMIN — FLUOXETINE 20 MG: 20 CAPSULE ORAL at 08:12

## 2017-12-26 RX ADMIN — LEVETIRACETAM 1000 MG: 100 SOLUTION ORAL at 10:12

## 2017-12-26 RX ADMIN — FINASTERIDE 5 MG: 5 TABLET, FILM COATED ORAL at 08:12

## 2017-12-26 RX ADMIN — BETHANECHOL CHLORIDE 10 MG: 10 TABLET ORAL at 10:12

## 2017-12-26 RX ADMIN — ASPIRIN 81 MG CHEWABLE TABLET 81 MG: 81 TABLET CHEWABLE at 08:12

## 2017-12-26 RX ADMIN — MUPIROCIN: 20 OINTMENT TOPICAL at 08:12

## 2017-12-26 RX ADMIN — TAMSULOSIN HYDROCHLORIDE 0.4 MG: 0.4 CAPSULE ORAL at 08:12

## 2017-12-26 RX ADMIN — SODIUM CHLORIDE, PRESERVATIVE FREE 3 ML: 5 INJECTION INTRAVENOUS at 10:12

## 2017-12-26 NOTE — ASSESSMENT & PLAN NOTE
POA, Providencia stuartii Proteus mirabalis   Zosyn course complete  Afebrile, no leukocytosis   Chest PT  Tolerating trach collar

## 2017-12-26 NOTE — ASSESSMENT & PLAN NOTE
From previous L MCA stroke  Continue baclofen x3 more days, then discontinue  Continue dantrolene

## 2017-12-26 NOTE — PT/OT/SLP PROGRESS
Physical Therapy Treatment    Patient Name:  Enrique Yancey   MRN:  [id number]    Recommendations:     Discharge Recommendations:  nursing facility, basic   Discharge Equipment Recommendations: none   Barriers to discharge: req 24/7 assistance at current time    Assessment:     Enrique Yancey is a 67 y.o. male admitted with a medical diagnosis of Seizure.  He presents with the following impairments/functional limitations:  weakness, impaired functional mobilty, impaired balance, decreased lower extremity function, decreased upper extremity function, decreased safety awareness, impaired endurance, impaired joint extensibility, impaired muscle length, impaired cardiopulmonary response to activity, decreased ROM. Pt with continued flexor contracture in (B) LE and UE. Incr ease of ROM in today's session. Will follow for additional visit for further family training and to assist c/ ROM.    Rehab Prognosis: Poor; patient would benefit from acute skilled PT services to address these deficits and reach maximum level of function.      Recent Surgery: * No surgery found *      Plan:     During this hospitalization, patient to be seen 2 x/week to address the above listed problems via therapeutic activities, therapeutic exercises  · Plan of Care Expires:  01/20/18   Plan of Care Reviewed with: patient    Subjective     Communicated with nsg prior to session.  Patient found supine in bed upon PT entry to room, agreeable to treatment.      Chief Complaint: nonverbal  Patient comments/goals: nonverbal  Pain/Comfort:  · Pain Rating 1: 0/10    Patients cultural, spiritual, Zoroastrian conflicts given the current situation:      Objective:     Patient found with: blood pressure cuff, bowel management system, tyler catheter, telemetry, peripheral IV, pulse ox (continuous), SCD, pressure relief boots, oxygen, PEG Tube     General Precautions: Standard, fall, seizure, aphasia, aspiration   Orthopedic Precautions:RUE partial weight bearing    Braces: N/A       PT performed PROM x 20 reps to (B) UE and (B) LE in available planes; (B) UE and LE flexor contractures with incr ROM performed into abd/add and extension. Rolled towel placed in (B) hands to assist c/ incr finger extension.  Placed on pillows at end of session.       AM-PAC 6 CLICK MOBILITY  Turning over in bed (including adjusting bedclothes, sheets and blankets)?: 1  Sitting down on and standing up from a chair with arms (e.g., wheelchair, bedside commode, etc.): 1  Moving from lying on back to sitting on the side of the bed?: 1  Moving to and from a bed to a chair (including a wheelchair)?: 1  Need to walk in hospital room?: 1  Climbing 3-5 steps with a railing?: 1  Total Score: 6     White board updated. Please promote HOB elevation and bed to chair functioning for safety to incr alertness and improve safety with tolerance to EOB activity. In bed mobility only with nsg      Patient left supine with all lines intact and call button in reach..    GOALS:    Physical Therapy Goals        Problem: Physical Therapy Goal    Goal Priority Disciplines Outcome Goal Variances Interventions   Physical Therapy Goal     PT/OT, PT Ongoing (interventions implemented as appropriate)     Description:  Goals to be met by: 14 days (18)    Patient will increase functional independence with mobility by performin. PT will demonstrate to family PROM technique and distribute handout to assist c/ carryover of activity.  2. PT will perform PROM x 20 reps to assist c/ slowed progression of (B) UE and LE contracture while in hospital.                      Time Tracking:     PT Received On: 17  PT Start Time: 951     PT Stop Time:   PT Total Time (min): 23 min     Billable Minutes: Therapeutic Activity 23    Treatment Type: Treatment  PT/PTA: PT           Marianna Castro, PT, DPT  2017

## 2017-12-26 NOTE — PLAN OF CARE
Problem: Patient Care Overview  Goal: Plan of Care Review  Outcome: Ongoing (interventions implemented as appropriate)  POC reviewed with Enrique Yancey at 1700. Pt unable to verbalize understanding. No acute events today. TF continued at 40 ml/hr. Pt progressing toward goals. Pt possibly moving to LTAC facility tomorrow. Will continue to monitor. See flowsheets for full assessment and VS info.

## 2017-12-26 NOTE — PLAN OF CARE
JOSE LUIS received message from Ashlyn with [facility] ([phone]). She reported they are accepting the Pt and can take her on Wednesday morning. She will call this SW to finalize but will need progress notes.    JOSE LUIS sent updated progress notes via [facility].    Annie Payan, RENALDO  Neurocritical Care   [facility]  [id number]

## 2017-12-26 NOTE — PLAN OF CARE
ICU Attending Note  Neurocritical Care    -baclofen taper and stop in 3 d  -dantrolene  -donepezil  -fluoxetine  -aspirin, atorvastatin  -levetiracetam

## 2017-12-26 NOTE — PROGRESS NOTES
[facility]  Neurocritical Care  Progress Note    Admit Date: 12/17/2017  Service Date: 12/26/2017  Length of Stay: 9    Subjective:     Chief Complaint: Seizure    History of Present Illness: Mr dickerson is a  is a 67 y.o. 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    who presents to [facility] for evaluation of new onset Seizure activity. He presented to outside ED with seizure activity for approximately 20 minutes. Per EMS, he was diaphoretic on arrival and tachycardic. Per nursing home, pt is nonverbal at baseline, but can focus on the speaker. He has a trach in place. He is being admitted to [facility] for a higher level of care.     Hospital Course: 12/17: admit to [facility], EEG pending, Keppra 1 G  at OSH and 500 Q 12 started, CTH: No acute process   12/18:increase keppra per epilepsy, sputum culture, trend procal  12/19: start RISS, discussed with daughter at bedside, not happy with Noa.  12/20: Pending to wean off the vent. Pending placement. Last EEG : L-PLEDS. The patient is very contracted. S/p trach placement.01/2017.  12/21: Pending to wean off the vent. And placement. Last EEG : L-PLEDS.  Yesterday Vanc was D/C and we left him on Zosyn. He was placed on isolation for E.Coli ESBL. S/P trach / PEG. Rehab consult to assess for Botox  injections for spasticity. Tolerating trach collar.   12/22: No clinical changes. Patient on day 2 of trach collar. ABG OK. Started on Dantrolene and pending to wean Baclofen off..  12/23 - no acute events  12/24 Pt continues to be stable. No significant events today. Will continue to monitor closely  12/25: Pending Ltac/Snf transfer on 12/26. If unable to place, will step down to internal Medicine.  12/26: Pending placement     Interval History: No significant events overnight. Accepted to nursing facility, able to accept tomorrow. Secondary stroke prevention measures restarted- ASA and statin.      Review of Systems: Unable to obtain a complete ROS due to level of consciousness.     Vitals:   Temp: 97 °F (36.1 °C)  Pulse: 72  Rhythm: normal sinus rhythm  BP: 134/82  MAP (mmHg): 103  Resp: 18  SpO2: 100 %  Oxygen Concentration (%): 40  O2 Device (Oxygen Therapy): tracheostomy collar    Temp  Min: 97 °F (36.1 °C)  Max: 99 °F (37.2 °C)  Pulse  Min: 65  Max: 97  BP  Min: 116/72  Max: 169/95  MAP (mmHg)  Min: 89  Max: 126  Resp  Min: 12  Max: 36  SpO2  Min: 96 %  Max: 100 %  Oxygen Concentration (%)  Min: 40  Max: 40    12/25 0701 - 12/26 0700  In: 1595 [I.V.:65]  Out: 705 [Urine:705]   Unmeasured Output  Stool Occurrence: 1     Examination:   Constitutional: Chronic illness. Contracted in all four extremities.    Eyes: Conjunctiva clear, anicteric. Lids no lesions.  Head/Ears/Nose/Mouth/Throat/Neck: Moist mucous membranes. External ears, nose atraumatic.   Cardiovascular: Regular rhythm. No murmurs. No leg edema.  Respiratory: Comfortable respirations. Clear to auscultation.  Gastrointestinal: PEG present. Soft, nondistended, nontender. + bowel sounds.    Neurologic:  -Eyes open spontaneously. Does not follow commands   -Contractures present to all four extremities      Medications:   Continuous Scheduled  aspirin 81 mg Daily   atorvastatin 40 mg Daily   B-complex with vitamin C 1 tablet Daily   baclofen 5 mg TID   bethanechol 10 mg Q8H   dantrolene 25 mg Q8H   donepezil 5 mg Daily   enoxparin 40 mg Q24H   famotidine 20 mg BID   finasteride 5 mg Daily   FLUoxetine 20 mg Daily   levetiracetam oral soln 1,000 mg BID   levothyroxine 75 mcg Daily   mupirocin  BID   polyethylene glycol 17 g Daily   senna-docusate 8.6-50 mg 1 tablet Daily   tamsulosin 0.4 mg BID   white petrolatum-mineral oil  QHS   PRN  acetaminophen 650 mg Q4H PRN   dextrose 50% 12.5 g PRN   glucagon (human recombinant) 1 mg PRN   insulin aspart 0-5 Units Q4H PRN   magnesium oxide 800 mg PRN   magnesium oxide 800 mg PRN   ondansetron 4 mg Q8H PRN    potassium chloride 10% 40 mEq PRN   potassium chloride 10% 40 mEq PRN   potassium chloride 10% 60 mEq PRN   potassium, sodium phosphates 2 packet PRN   potassium, sodium phosphates 2 packet PRN   potassium, sodium phosphates 2 packet PRN   sodium chloride 0.9% 3 mL PRN      Today I independently reviewed pertinent medications, lines/drains/airways, lab results,     Assessment/Plan:     Neuro   * Seizure    - epilepsy following  -keppra  1g BID  -No epileptiform activity 12/17        Cerebrovascular accident (CVA) due to thrombosis of left carotid artery    Previous stroke in 2016  ASA and statin restarted for secondary stroke prevention         Right spastic hemiparesis    From previous L MCA stroke  Continue baclofen x3 more days, then discontinue  Continue dantrolene         Pulmonary   Pneumonia due to infectious organism    POA, Providencia stuartii Proteus mirabalis   Zosyn course complete  Afebrile, no leukocytosis   Chest PT  Tolerating trach collar           Renal/   Urinary retention    Continue Proscar/bethanecol        CKD (chronic kidney disease) stage 3, GFR 30-59 ml/min    Daily CMP  Enteral water boluses 200 q6h        Endocrine   Hypothyroidism    75 mcg levothyroxine daily               Prophylaxis:  Venous Thromboembolism: mechanical chemical  Stress Ulcer: H2B  Ventilator Pneumonia: no     Activity Orders          Ambulate with assistance starting at 12/17 0447        Prior    Stefania Petty PA-C  Neurocritical Care  [facility]

## 2017-12-27 LAB
ALBUMIN SERPL BCP-MCNC: 2.4 G/DL
ALP SERPL-CCNC: 164 U/L
ALT SERPL W/O P-5'-P-CCNC: 40 U/L
ANION GAP SERPL CALC-SCNC: 7 MMOL/L
AST SERPL-CCNC: 27 U/L
BASOPHILS # BLD AUTO: 0.02 K/UL
BASOPHILS NFR BLD: 0.2 %
BILIRUB SERPL-MCNC: 0.4 MG/DL
BUN SERPL-MCNC: 36 MG/DL
CALCIUM SERPL-MCNC: 9 MG/DL
CHLORIDE SERPL-SCNC: 114 MMOL/L
CO2 SERPL-SCNC: 20 MMOL/L
CREAT SERPL-MCNC: 1 MG/DL
DIFFERENTIAL METHOD: ABNORMAL
EOSINOPHIL # BLD AUTO: 0.3 K/UL
EOSINOPHIL NFR BLD: 2.9 %
ERYTHROCYTE [DISTWIDTH] IN BLOOD BY AUTOMATED COUNT: 15.9 %
EST. GFR  (AFRICAN AMERICAN): >60 ML/MIN/1.73 M^2
EST. GFR  (NON AFRICAN AMERICAN): >60 ML/MIN/1.73 M^2
GLUCOSE SERPL-MCNC: 104 MG/DL
HCT VFR BLD AUTO: 31.3 %
HGB BLD-MCNC: 10 G/DL
IMM GRANULOCYTES # BLD AUTO: 0.12 K/UL
IMM GRANULOCYTES NFR BLD AUTO: 1.1 %
LYMPHOCYTES # BLD AUTO: 1.4 K/UL
LYMPHOCYTES NFR BLD: 12.9 %
MAGNESIUM SERPL-MCNC: 2.1 MG/DL
MCH RBC QN AUTO: 31.2 PG
MCHC RBC AUTO-ENTMCNC: 31.9 G/DL
MCV RBC AUTO: 98 FL
MONOCYTES # BLD AUTO: 0.8 K/UL
MONOCYTES NFR BLD: 7.7 %
NEUTROPHILS # BLD AUTO: 7.9 K/UL
NEUTROPHILS NFR BLD: 75.2 %
NRBC BLD-RTO: 0 /100 WBC
PHOSPHATE SERPL-MCNC: 3.1 MG/DL
PLATELET # BLD AUTO: 211 K/UL
PMV BLD AUTO: 11.7 FL
POCT GLUCOSE: 105 MG/DL (ref 70–110)
POCT GLUCOSE: 117 MG/DL (ref 70–110)
POCT GLUCOSE: 130 MG/DL (ref 70–110)
POCT GLUCOSE: 99 MG/DL (ref 70–110)
POTASSIUM SERPL-SCNC: 3.9 MMOL/L
PROT SERPL-MCNC: 7.9 G/DL
RBC # BLD AUTO: 3.21 M/UL
SODIUM SERPL-SCNC: 139 MMOL/L
SODIUM SERPL-SCNC: 140 MMOL/L
SODIUM SERPL-SCNC: 141 MMOL/L
WBC # BLD AUTO: 10.52 K/UL

## 2017-12-27 PROCEDURE — 25000003 PHARM REV CODE 250: Performed by: PHYSICIAN ASSISTANT

## 2017-12-27 PROCEDURE — 84100 ASSAY OF PHOSPHORUS: CPT

## 2017-12-27 PROCEDURE — 25000003 PHARM REV CODE 250: Performed by: PSYCHIATRY & NEUROLOGY

## 2017-12-27 PROCEDURE — 84295 ASSAY OF SERUM SODIUM: CPT

## 2017-12-27 PROCEDURE — 83735 ASSAY OF MAGNESIUM: CPT

## 2017-12-27 PROCEDURE — 99900026 HC AIRWAY MAINTENANCE (STAT)

## 2017-12-27 PROCEDURE — 80053 COMPREHEN METABOLIC PANEL: CPT

## 2017-12-27 PROCEDURE — 27200966 HC CLOSED SUCTION SYSTEM

## 2017-12-27 PROCEDURE — 85025 COMPLETE CBC W/AUTO DIFF WBC: CPT

## 2017-12-27 PROCEDURE — 94761 N-INVAS EAR/PLS OXIMETRY MLT: CPT

## 2017-12-27 PROCEDURE — 99233 SBSQ HOSP IP/OBS HIGH 50: CPT | Mod: ,,, | Performed by: NURSE PRACTITIONER

## 2017-12-27 PROCEDURE — 20000000 HC ICU ROOM

## 2017-12-27 PROCEDURE — 63600175 PHARM REV CODE 636 W HCPCS: Performed by: PHYSICIAN ASSISTANT

## 2017-12-27 PROCEDURE — 25000003 PHARM REV CODE 250: Performed by: NURSE PRACTITIONER

## 2017-12-27 PROCEDURE — 27000221 HC OXYGEN, UP TO 24 HOURS

## 2017-12-27 PROCEDURE — A4216 STERILE WATER/SALINE, 10 ML: HCPCS | Performed by: NURSE PRACTITIONER

## 2017-12-27 RX ORDER — BACLOFEN 20 MG/1
20 TABLET ORAL 3 TIMES DAILY
Qty: 6 TABLET | Refills: 0
Start: 2017-12-27 | End: 2017-12-29

## 2017-12-27 RX ORDER — LEVETIRACETAM 100 MG/ML
1000 SOLUTION ORAL 2 TIMES DAILY
Qty: 600 ML | Refills: 11 | Status: SHIPPED | OUTPATIENT
Start: 2017-12-27 | End: 2018-12-27

## 2017-12-27 RX ORDER — DANTROLENE SODIUM 25 MG/1
25 CAPSULE ORAL EVERY 8 HOURS
Qty: 90 CAPSULE | Refills: 11 | Status: SHIPPED | OUTPATIENT
Start: 2017-12-27 | End: 2018-01-24 | Stop reason: HOSPADM

## 2017-12-27 RX ORDER — ENOXAPARIN SODIUM 100 MG/ML
40 INJECTION SUBCUTANEOUS DAILY
Start: 2017-12-27 | End: 2018-01-24 | Stop reason: HOSPADM

## 2017-12-27 RX ORDER — INSULIN ASPART 100 [IU]/ML
0-5 INJECTION, SOLUTION INTRAVENOUS; SUBCUTANEOUS EVERY 4 HOURS PRN
Refills: 0
Start: 2017-12-27 | End: 2018-12-27

## 2017-12-27 RX ADMIN — STANDARDIZED SENNA CONCENTRATE AND DOCUSATE SODIUM 1 TABLET: 8.6; 5 TABLET, FILM COATED ORAL at 08:12

## 2017-12-27 RX ADMIN — LEVETIRACETAM 1000 MG: 100 SOLUTION ORAL at 08:12

## 2017-12-27 RX ADMIN — FINASTERIDE 5 MG: 5 TABLET, FILM COATED ORAL at 08:12

## 2017-12-27 RX ADMIN — MUPIROCIN: 20 OINTMENT TOPICAL at 08:12

## 2017-12-27 RX ADMIN — ASPIRIN 81 MG CHEWABLE TABLET 81 MG: 81 TABLET CHEWABLE at 08:12

## 2017-12-27 RX ADMIN — FLUOXETINE 20 MG: 20 CAPSULE ORAL at 08:12

## 2017-12-27 RX ADMIN — SODIUM CHLORIDE, PRESERVATIVE FREE 3 ML: 5 INJECTION INTRAVENOUS at 09:12

## 2017-12-27 RX ADMIN — FAMOTIDINE 20 MG: 20 TABLET, FILM COATED ORAL at 08:12

## 2017-12-27 RX ADMIN — BETHANECHOL CHLORIDE 10 MG: 10 TABLET ORAL at 09:12

## 2017-12-27 RX ADMIN — BETHANECHOL CHLORIDE 10 MG: 10 TABLET ORAL at 02:12

## 2017-12-27 RX ADMIN — TAMSULOSIN HYDROCHLORIDE 0.4 MG: 0.4 CAPSULE ORAL at 08:12

## 2017-12-27 RX ADMIN — BACLOFEN 5 MG: 10 TABLET ORAL at 09:12

## 2017-12-27 RX ADMIN — POLYETHYLENE GLYCOL 3350 17 G: 17 POWDER, FOR SOLUTION ORAL at 08:12

## 2017-12-27 RX ADMIN — LEVOTHYROXINE SODIUM 75 MCG: 75 TABLET ORAL at 08:12

## 2017-12-27 RX ADMIN — DANTROLENE SODIUM 25 MG: 25 CAPSULE ORAL at 05:12

## 2017-12-27 RX ADMIN — BACLOFEN 5 MG: 10 TABLET ORAL at 02:12

## 2017-12-27 RX ADMIN — LEVETIRACETAM 1000 MG: 100 SOLUTION ORAL at 09:12

## 2017-12-27 RX ADMIN — BACLOFEN 5 MG: 10 TABLET ORAL at 05:12

## 2017-12-27 RX ADMIN — DONEPEZIL HYDROCHLORIDE 5 MG: 5 TABLET, FILM COATED ORAL at 08:12

## 2017-12-27 RX ADMIN — DANTROLENE SODIUM 25 MG: 25 CAPSULE ORAL at 02:12

## 2017-12-27 RX ADMIN — BETHANECHOL CHLORIDE 10 MG: 10 TABLET ORAL at 05:12

## 2017-12-27 RX ADMIN — ATORVASTATIN CALCIUM 40 MG: 20 TABLET, FILM COATED ORAL at 08:12

## 2017-12-27 RX ADMIN — FAMOTIDINE 20 MG: 20 TABLET, FILM COATED ORAL at 09:12

## 2017-12-27 RX ADMIN — VITAMIN C 1 TABLET: TAB at 08:12

## 2017-12-27 RX ADMIN — MUPIROCIN: 20 OINTMENT TOPICAL at 09:12

## 2017-12-27 RX ADMIN — ENOXAPARIN SODIUM 40 MG: 100 INJECTION SUBCUTANEOUS at 05:12

## 2017-12-27 RX ADMIN — DANTROLENE SODIUM 25 MG: 25 CAPSULE ORAL at 09:12

## 2017-12-27 RX ADMIN — MINERAL OIL AND WHITE PETROLATUM: 150; 830 OINTMENT OPHTHALMIC at 03:12

## 2017-12-27 RX ADMIN — TAMSULOSIN HYDROCHLORIDE 0.4 MG: 0.4 CAPSULE ORAL at 09:12

## 2017-12-27 RX ADMIN — MINERAL OIL AND WHITE PETROLATUM: 150; 830 OINTMENT OPHTHALMIC at 09:12

## 2017-12-27 NOTE — PLAN OF CARE
[facility]    FACILITY TRANSFER ORDERS      Patient Name: Enrique Yancey  YOB: 1950    PCP: Ang Hassan MD   PCP Address: [address] [place] [address]  / [place] [id number]  PCP Phone Number: [phone]  PCP Fax: [phone]    Encounter Date: 12/27/2017    Admit to: [facility]     Vital Signs:  Routine    Diagnoses:   Active Hospital Problems    Diagnosis  POA    *Seizure [R56.9]  Yes     Priority: 1 - High    Urinary retention [R33.9]  Unknown    Hypothyroidism [E03.9]  Unknown    Spasticity [R25.2]  Unknown    Acute cystitis without hematuria [N30.00]  Yes    Pneumonia due to infectious organism [J18.9]  Yes    Toxic encephalopathy [G92]  Yes    Cerebrovascular accident (CVA) due to thrombosis of left carotid artery [I63.032]  Yes    Dysphagia [R13.10]  Yes    Aphasia [R47.01]  Yes    Right spastic hemiparesis [G81.11]  Yes    CKD (chronic kidney disease) stage 3, GFR 30-59 ml/min [N18.3]  Yes    HTN (hypertension) [I10]  Yes      Resolved Hospital Problems    Diagnosis Date Resolved POA   No resolved problems to display.       Allergies:  Review of patient's allergies indicates:   Allergen Reactions    Diltiazem hcl     Metoprolol tartrate     Sulfa (sulfonamide antibiotics)        Diet: tube feedings: Continuous Isosource 1.5 at 40 mL per hour for 24 hours per day    Activities: Bed rest    Nursing: vitals per routine  Trach collar     Labs: CBC and CMP Daily for 3 days     CONSULTS:    Physical Therapy to evaluate and treat. , Occupational Therapy to evaluate and treat. and  to evaluate for community resources/long-range planning.    MISCELLANEOUS CARE:  PEG Care: Clean site every 24 hours.  and Routine Skin for Bedridden Patients: Apply moisture barrier cream to all skin folds and wet areas in perineal area daily and after baths and all bowel movements.    WOUND CARE ORDERS  None    Medications: Review discharge medications with patient and family and  provide education.      Current Discharge Medication List      START taking these medications    Details   dantrolene (DANTRIUM) 25 MG Cap Take 1 capsule (25 mg total) by mouth every 8 (eight) hours.  Qty: 90 capsule, Refills: 11      enoxaparin (LOVENOX) 40 mg/0.4 mL Syrg Inject 0.4 mLs (40 mg total) into the skin once daily.      insulin aspart (NOVOLOG) 100 unit/mL InPn pen Inject 0-5 Units into the skin every 4 (four) hours as needed (Hyperglycemia).  Refills: 0      levetiracetam oral soln 500 mg/5 mL (5 mL) Soln 10 mLs (1,000 mg total) by Per G Tube route 2 (two) times daily.  Qty: 600 mL, Refills: 11         CONTINUE these medications which have CHANGED    Details   baclofen (LIORESAL) 20 MG tablet 1 tablet (20 mg total) by Per G Tube route 3 (three) times daily.  Qty: 6 tablet, Refills: 0         CONTINUE these medications which have NOT CHANGED    Details   acetaminophen (TYLENOL) 160 mg/5 mL Elix 20.3 mLs by Per G Tube route every 4 (four) hours as needed.      ascorbic acid, vitamin C, (VITAMIN C) 500 mg/5 mL Syrp syrup 500 mg by Per G Tube route once daily.      atorvastatin (LIPITOR) 40 MG tablet 40 mg by Per G Tube route every evening.      B-complex with vitamin C (Z-BEC OR EQUIV) tablet 1 tablet by Per G Tube route once daily.      bethanechol (URECHOLINE) 25 MG Tab 10 mg by Per G Tube route every 8 (eight) hours.      chlorhexidine (PERIDEX) 0.12 % solution Use as directed 15 mLs in the mouth or throat 2 (two) times daily.      donepezil (ARICEPT) 5 MG tablet 5 mg by Per G Tube route once daily.      finasteride (PROSCAR) 5 mg tablet 5 mg by Per G Tube route once daily.      FLUoxetine (PROZAC) 20 MG capsule 20 mg by Per G Tube route once daily.      labetalol (NORMODYNE) 300 MG tablet 300 mg by Per G Tube route 2 (two) times daily.      levothyroxine (SYNTHROID) 75 MCG tablet 75 mcg by Per G Tube route once daily.      lidocaine 5 % Gel Apply topically. For trach change      loperamide  (ANTI-DIARRHEAL, LOPERAMIDE,) 2 mg capsule 2 mg by Per G Tube route. Take 1 tablet per PEG after each diarrhea to a max of 8 doses/24 hours      mupirocin (BACTROBAN) 2 % ointment Apply topically 2 (two) times daily. Apply to penis two times a day until healed.      omeprazole (PRILOSEC) 40 MG capsule 40 mg once daily.      polyethylene glycol (GLYCOLAX) 17 gram PwPk by Per G Tube route once daily.      potassium chloride SA (K-DUR,KLOR-CON) 20 MEQ tablet Take 20 mEq by mouth 2 (two) times daily.      tamsulosin (FLOMAX) 0.4 mg Cp24 0.4 mg by Per G Tube route 2 (two) times daily.      CYANOCOBALAMIN, VITAMIN B-12, (VITAMIN B-12 ORAL) 2,500 mcg by Per G Tube route once daily.          STOP taking these medications       amLODIPine (NORVASC) 10 MG tablet Comments:   Reason for Stopping:         sodium phosphates (ENEMA) 19-7 gram/118 mL Enem Comments:   Reason for Stopping:         amlodipine (NORVASC) 10 MG tablet Comments:   Reason for Stopping:                    _________________________________  Daniel Crandall NP  12/27/2017

## 2017-12-27 NOTE — PROGRESS NOTES
[facility]  Neurocritical Care  Progress Note    Admit Date: 12/17/2017  Service Date: 12/27/2017  Length of Stay: 10    Subjective:     Chief Complaint: Seizure    History of Present Illness: Mr dickerson is a  is a 67 y.o. 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    who presents to [facility] for evaluation of new onset Seizure activity. He presented to outside ED with seizure activity for approximately 20 minutes. Per EMS, he was diaphoretic on arrival and tachycardic. Per nursing home, pt is nonverbal at baseline, but can focus on the speaker. He has a trach in place. He is being admitted to [facility] for a higher level of care.     Hospital Course: 12/17: admit to [facility], EEG pending, Keppra 1 G  at OSH and 500 Q 12 started, CTH: No acute process   12/18:increase keppra per epilepsy, sputum culture, trend procal  12/19: start RISS, discussed with daughter at bedside, not happy with Noa.  12/20: Pending to wean off the vent. Pending placement. Last EEG : L-PLEDS. The patient is very contracted. S/p trach placement.01/2017.  12/21: Pending to wean off the vent. And placement. Last EEG : L-PLEDS.  Yesterday Vanc was D/C and we left him on Zosyn. He was placed on isolation for E.Coli ESBL. S/P trach / PEG. Rehab consult to assess for Botox  injections for spasticity. Tolerating trach collar.   12/22: No clinical changes. Patient on day 2 of trach collar. ABG OK. Started on Dantrolene and pending to wean Baclofen off..  12/23 - no acute events  12/24 Pt continues to be stable. No significant events today. Will continue to monitor closely  12/25: Pending Ltac/Snf transfer on 12/26. If unable to place, will step down to internal Medicine.  12/26: Pending placement   12/27: Transfer to LTAC     Interval History:  No significant events over night transfer to LTAC today    Review of Systems   Unable to obtain a complete ROS due to level of  consciousness.  Objective:     Vitals:  Temp: 98 °F (36.7 °C) (12/27/17 1100)  Pulse: 83 (12/27/17 1200)  Resp: 19 (12/27/17 1200)  BP: 138/84 (12/27/17 1200)  SpO2: 99 % (12/27/17 1200)    Temp:  [97.8 °F (36.6 °C)-99.2 °F (37.3 °C)] 98 °F (36.7 °C)  Pulse:  [] 83  Resp:  [14-24] 19  SpO2:  [95 %-100 %] 99 %  BP: (115-153)/(75-94) 138/84         Oxygen Concentration (%):  [40] 40    12/26 0701 - 12/27 0700  In: 1920 [I.V.:120]  Out: 1485 [Urine:1485]    Physical Exam       Physical Exam:  GA: chronic illness  no acute distress.   HEENT: No scleral icterus or JVD.   Pulmonary: Clear to auscultation A/P/L.   Cardiac: RRR S1 & S2 w/o rubs/murmurs/gallops.   Abdominal: Bowel sounds present x 4.   Skin: No jaundice, rashes, or visible lesions.  Neuro:  --GCS: E4 V1 M1 baseline   --Mental Status:  Opens eyes spontaneously does not follow commands   --Corneal reflex, gag, cough intact.  --contracted x 4 ext   Unable to test orientation, language, memory, coordination, gait due to level of consciousness.    Medications:  Continuous Scheduled  aspirin 81 mg Daily   atorvastatin 40 mg Daily   B-complex with vitamin C 1 tablet Daily   baclofen 5 mg TID   bethanechol 10 mg Q8H   dantrolene 25 mg Q8H   donepezil 5 mg Daily   enoxparin 40 mg Q24H   famotidine 20 mg BID   finasteride 5 mg Daily   FLUoxetine 20 mg Daily   levetiracetam oral soln 1,000 mg BID   levothyroxine 75 mcg Daily   mupirocin  BID   polyethylene glycol 17 g Daily   senna-docusate 8.6-50 mg 1 tablet Daily   tamsulosin 0.4 mg BID   white petrolatum-mineral oil  QHS   PRN  acetaminophen 650 mg Q4H PRN   dextrose 50% 12.5 g PRN   glucagon (human recombinant) 1 mg PRN   insulin aspart 0-5 Units Q4H PRN   magnesium oxide 800 mg PRN   magnesium oxide 800 mg PRN   ondansetron 4 mg Q8H PRN   potassium chloride 10% 40 mEq PRN   potassium chloride 10% 40 mEq PRN   potassium chloride 10% 60 mEq PRN   potassium, sodium phosphates 2 packet PRN   potassium, sodium  phosphates 2 packet PRN   potassium, sodium phosphates 2 packet PRN   sodium chloride 0.9% 3 mL PRN     Today I personally reviewed pertinent medications, lines/drains/airways, imaging, lab results,     Assessment/Plan:     Neuro   * Seizure    - epilepsy following  -keppra  1g BID  -No epileptiform activity 12/17        Toxic encephalopathy    - secondary to pneumonia, uti, dehydration        Cerebrovascular accident (CVA) due to thrombosis of left carotid artery    Previous stroke in 2016  ASA and statin restarted for secondary stroke prevention         Right spastic hemiparesis    From previous L MCA stroke  Continue baclofen x2 more days, then discontinue  Continue dantrolene         Aphasia    - Hx  prior Stroke  TF        Pulmonary   Pneumonia due to infectious organism    POA, Providencia stuartii Proteus mirabalis   Zosyn course complete  Afebrile, no leukocytosis   Chest PT  Tolerating trach collar           Cardiac/Vascular   HTN (hypertension)    - SBP < 180   - PRN Labetalol         Renal/   Urinary retention    Continue Proscar/bethanecol        Acute cystitis without hematuria    POA  Continue vanc and zosyn  Condom cath        CKD (chronic kidney disease) stage 3, GFR 30-59 ml/min    Daily CMP  Enteral water boluses 200 q6h        Endocrine   Hypothyroidism    75 mcg levothyroxine daily         GI   Dysphagia    - NPO  -  meds per peg   -tube feeds        Other   Spasticity    --continue Baclofen 10 mg TID             Prophylaxis:  Venous Thromboembolism: mechanical chemical  Stress Ulcer: PPI  Ventilator Pneumonia: not applicable     Activity Orders          Ambulate with assistance starting at 12/17 2657        Prior    Daniel Crandall NP  Neurocritical Care  [facility]

## 2017-12-27 NOTE — PLAN OF CARE
Problem: Patient Care Overview  Goal: Plan of Care Review  Outcome: Ongoing (interventions implemented as appropriate)  POC reviewed with patient and family via phone at 1200. Patient's family acknowledged understanding of POC; patient is unable. Patient has remained free of falls, injuries and skin breakdown for the duration of the shift. VSS. No acute distress noted. No complaints. Plans: Patient accepted to LTAC; pending family approval. Possible transfer on Wednesday. Will continue to monitor and update as needed.

## 2017-12-27 NOTE — PLAN OF CARE
ICU Attending Note  Neurocritical Care    -wean baclofen to off  -dantrolene  -aspirin, atorvastatin  -levetiracetam for seizures  -TCM  -awaiting LTACH today

## 2017-12-27 NOTE — PLAN OF CARE
JOSE LUIS spoke with Ashlyn with [facility] ([phone]). She asked for clinical docs as the fax sent yesterday did not go through. They are able to accept this Pt today and she asked for orders.    JOSE LUIS sent requested clinical docs via email ([email or web address]) as fax again did not go through in [facility]. Advised CM who advised MD team of need for orders. JOSE LUIS will send to Ashlyn once placed and start the transfer process.     Annie Payan, RENALDO  Neurocritical Care   [facility]  [id number]

## 2017-12-27 NOTE — PLAN OF CARE
Problem: Patient Care Overview  Goal: Plan of Care Review  Outcome: Ongoing (interventions implemented as appropriate)  POC reviewed with pt's daughter by phone at 1400. Daughter verbalized understanding. Questions and concerns addressed. No acute events today. Pt progressing toward goals. Will continue to monitor. See flowsheets for full assessment and VS info.

## 2017-12-27 NOTE — PLAN OF CARE
Pending placement.  Discharge Plan: [facility].       12/27/17 9803   Discharge Reassessment   Assessment Type Discharge Planning Reassessment   Provided patient/caregiver education on the expected discharge date and the discharge plan No   Do you have any problems affording any of your prescribed medications? No   Discharge Plan A Return to nursing home   Discharge Plan B Skilled Nursing Facility   Patient choice form signed by patient/caregiver No   Can the patient answer the patient profile reliably? No, cognitively impaired   How does the patient rate their overall health at the present time? (lydia)   Describe the patient's ability to walk at the present time. Does not walk or unable to take any steps at all   How often would a person be available to care for the patient? Often   Number of comorbid conditions (as recorded on the chart) Five or more   During the past month, has the patient often been bothered by feeling down, depressed or hopeless? (lydia)   During the past month, has the patient often been bothered by little interest or pleasure in doing things? (lydia)       Marisol Schroeder RN/JERAMIEN/HASMUKH  [phone]  [facility]

## 2017-12-27 NOTE — ASSESSMENT & PLAN NOTE
From previous L MCA stroke  Continue baclofen x2 more days, then discontinue  Continue dantrolene

## 2017-12-28 LAB
ALBUMIN SERPL BCP-MCNC: 2.4 G/DL
ALLENS TEST: ABNORMAL
ALP SERPL-CCNC: 163 U/L
ALT SERPL W/O P-5'-P-CCNC: 35 U/L
ANION GAP SERPL CALC-SCNC: 5 MMOL/L
AST SERPL-CCNC: 26 U/L
BASOPHILS # BLD AUTO: 0.04 K/UL
BASOPHILS NFR BLD: 0.4 %
BILIRUB SERPL-MCNC: 0.4 MG/DL
BUN SERPL-MCNC: 37 MG/DL
CALCIUM SERPL-MCNC: 9 MG/DL
CHLORIDE SERPL-SCNC: 111 MMOL/L
CO2 SERPL-SCNC: 23 MMOL/L
CREAT SERPL-MCNC: 1.1 MG/DL
DELSYS: ABNORMAL
DIFFERENTIAL METHOD: ABNORMAL
EOSINOPHIL # BLD AUTO: 0.3 K/UL
EOSINOPHIL NFR BLD: 2.6 %
ERYTHROCYTE [DISTWIDTH] IN BLOOD BY AUTOMATED COUNT: 16.2 %
ERYTHROCYTE [SEDIMENTATION RATE] IN BLOOD BY WESTERGREN METHOD: 20 MM/H
EST. GFR  (AFRICAN AMERICAN): >60 ML/MIN/1.73 M^2
EST. GFR  (NON AFRICAN AMERICAN): >60 ML/MIN/1.73 M^2
FIO2: 28
FLOW: 5
GLUCOSE SERPL-MCNC: 103 MG/DL
HCO3 UR-SCNC: 21.9 MMOL/L (ref 24–28)
HCT VFR BLD AUTO: 29.9 %
HGB BLD-MCNC: 9.9 G/DL
IMM GRANULOCYTES # BLD AUTO: 0.07 K/UL
IMM GRANULOCYTES NFR BLD AUTO: 0.6 %
LYMPHOCYTES # BLD AUTO: 1.1 K/UL
LYMPHOCYTES NFR BLD: 10.3 %
MAGNESIUM SERPL-MCNC: 2.1 MG/DL
MCH RBC QN AUTO: 31.4 PG
MCHC RBC AUTO-ENTMCNC: 33.1 G/DL
MCV RBC AUTO: 95 FL
MODE: ABNORMAL
MONOCYTES # BLD AUTO: 0.9 K/UL
MONOCYTES NFR BLD: 8 %
NEUTROPHILS # BLD AUTO: 8.7 K/UL
NEUTROPHILS NFR BLD: 78.1 %
NRBC BLD-RTO: 0 /100 WBC
PCO2 BLDA: 30.2 MMHG (ref 35–45)
PH SMN: 7.47 [PH] (ref 7.35–7.45)
PHOSPHATE SERPL-MCNC: 3.2 MG/DL
PLATELET # BLD AUTO: 219 K/UL
PMV BLD AUTO: 12.1 FL
PO2 BLDA: 67 MMHG (ref 80–100)
POC BE: -2 MMOL/L
POC SATURATED O2: 94 % (ref 95–100)
POC TCO2: 23 MMOL/L (ref 23–27)
POCT GLUCOSE: 101 MG/DL (ref 70–110)
POCT GLUCOSE: 108 MG/DL (ref 70–110)
POCT GLUCOSE: 110 MG/DL (ref 70–110)
POCT GLUCOSE: 118 MG/DL (ref 70–110)
POCT GLUCOSE: 118 MG/DL (ref 70–110)
POCT GLUCOSE: 138 MG/DL (ref 70–110)
POTASSIUM SERPL-SCNC: 3.9 MMOL/L
PROT SERPL-MCNC: 7.8 G/DL
RBC # BLD AUTO: 3.15 M/UL
SAMPLE: ABNORMAL
SITE: ABNORMAL
SODIUM SERPL-SCNC: 139 MMOL/L
SODIUM SERPL-SCNC: 140 MMOL/L
SODIUM SERPL-SCNC: 140 MMOL/L
SP02: 95
WBC # BLD AUTO: 11.12 K/UL

## 2017-12-28 PROCEDURE — 25000003 PHARM REV CODE 250: Performed by: NURSE PRACTITIONER

## 2017-12-28 PROCEDURE — 27000221 HC OXYGEN, UP TO 24 HOURS

## 2017-12-28 PROCEDURE — 63600175 PHARM REV CODE 636 W HCPCS: Performed by: PHYSICIAN ASSISTANT

## 2017-12-28 PROCEDURE — 36600 WITHDRAWAL OF ARTERIAL BLOOD: CPT

## 2017-12-28 PROCEDURE — 25000003 PHARM REV CODE 250: Performed by: PSYCHIATRY & NEUROLOGY

## 2017-12-28 PROCEDURE — 99900035 HC TECH TIME PER 15 MIN (STAT)

## 2017-12-28 PROCEDURE — 99233 SBSQ HOSP IP/OBS HIGH 50: CPT | Mod: ,,, | Performed by: PSYCHIATRY & NEUROLOGY

## 2017-12-28 PROCEDURE — 25000003 PHARM REV CODE 250: Performed by: PHYSICIAN ASSISTANT

## 2017-12-28 PROCEDURE — A4216 STERILE WATER/SALINE, 10 ML: HCPCS | Performed by: NURSE PRACTITIONER

## 2017-12-28 PROCEDURE — 83735 ASSAY OF MAGNESIUM: CPT

## 2017-12-28 PROCEDURE — 20000000 HC ICU ROOM

## 2017-12-28 PROCEDURE — 84295 ASSAY OF SERUM SODIUM: CPT

## 2017-12-28 PROCEDURE — 82803 BLOOD GASES ANY COMBINATION: CPT

## 2017-12-28 PROCEDURE — 85025 COMPLETE CBC W/AUTO DIFF WBC: CPT

## 2017-12-28 PROCEDURE — 80053 COMPREHEN METABOLIC PANEL: CPT

## 2017-12-28 PROCEDURE — 94761 N-INVAS EAR/PLS OXIMETRY MLT: CPT

## 2017-12-28 PROCEDURE — 84100 ASSAY OF PHOSPHORUS: CPT

## 2017-12-28 PROCEDURE — 97803 MED NUTRITION INDIV SUBSEQ: CPT

## 2017-12-28 PROCEDURE — 99232 SBSQ HOSP IP/OBS MODERATE 35: CPT | Mod: ,,, | Performed by: NURSE PRACTITIONER

## 2017-12-28 RX ADMIN — BETHANECHOL CHLORIDE 10 MG: 10 TABLET ORAL at 03:12

## 2017-12-28 RX ADMIN — ASPIRIN 81 MG CHEWABLE TABLET 81 MG: 81 TABLET CHEWABLE at 09:12

## 2017-12-28 RX ADMIN — MUPIROCIN: 20 OINTMENT TOPICAL at 09:12

## 2017-12-28 RX ADMIN — TAMSULOSIN HYDROCHLORIDE 0.4 MG: 0.4 CAPSULE ORAL at 09:12

## 2017-12-28 RX ADMIN — LEVOTHYROXINE SODIUM 75 MCG: 75 TABLET ORAL at 09:12

## 2017-12-28 RX ADMIN — STANDARDIZED SENNA CONCENTRATE AND DOCUSATE SODIUM 1 TABLET: 8.6; 5 TABLET, FILM COATED ORAL at 09:12

## 2017-12-28 RX ADMIN — DANTROLENE SODIUM 25 MG: 25 CAPSULE ORAL at 03:12

## 2017-12-28 RX ADMIN — DANTROLENE SODIUM 25 MG: 25 CAPSULE ORAL at 09:12

## 2017-12-28 RX ADMIN — POLYETHYLENE GLYCOL 3350 17 G: 17 POWDER, FOR SOLUTION ORAL at 09:12

## 2017-12-28 RX ADMIN — BETHANECHOL CHLORIDE 10 MG: 10 TABLET ORAL at 06:12

## 2017-12-28 RX ADMIN — ENOXAPARIN SODIUM 40 MG: 100 INJECTION SUBCUTANEOUS at 05:12

## 2017-12-28 RX ADMIN — MINERAL OIL AND WHITE PETROLATUM: 150; 830 OINTMENT OPHTHALMIC at 09:12

## 2017-12-28 RX ADMIN — SODIUM CHLORIDE, PRESERVATIVE FREE 3 ML: 5 INJECTION INTRAVENOUS at 09:12

## 2017-12-28 RX ADMIN — BACLOFEN 5 MG: 10 TABLET ORAL at 09:12

## 2017-12-28 RX ADMIN — BACLOFEN 5 MG: 10 TABLET ORAL at 06:12

## 2017-12-28 RX ADMIN — BETHANECHOL CHLORIDE 10 MG: 10 TABLET ORAL at 09:12

## 2017-12-28 RX ADMIN — FAMOTIDINE 20 MG: 20 TABLET, FILM COATED ORAL at 09:12

## 2017-12-28 RX ADMIN — FLUOXETINE 20 MG: 20 CAPSULE ORAL at 09:12

## 2017-12-28 RX ADMIN — LEVETIRACETAM 1000 MG: 100 SOLUTION ORAL at 09:12

## 2017-12-28 RX ADMIN — DONEPEZIL HYDROCHLORIDE 5 MG: 5 TABLET, FILM COATED ORAL at 09:12

## 2017-12-28 RX ADMIN — DANTROLENE SODIUM 25 MG: 25 CAPSULE ORAL at 06:12

## 2017-12-28 RX ADMIN — BACLOFEN 5 MG: 10 TABLET ORAL at 03:12

## 2017-12-28 RX ADMIN — VITAMIN C 1 TABLET: TAB at 09:12

## 2017-12-28 RX ADMIN — FINASTERIDE 5 MG: 5 TABLET, FILM COATED ORAL at 09:12

## 2017-12-28 RX ADMIN — ATORVASTATIN CALCIUM 40 MG: 20 TABLET, FILM COATED ORAL at 09:12

## 2017-12-28 NOTE — ASSESSMENT & PLAN NOTE
POA, Providencia stuartii Proteus mirabalis   Zosyn course complete  Afebrile, no leukocytosis   Chest PT  Tolerating trach collar @ 5L

## 2017-12-28 NOTE — PLAN OF CARE
JOSE LUIS left message for Ashlyn reporting the Pt is now on 28% per request and 5L.    JOSE LUIS emailed Ashlyn the facility transfer orders and is waiting on her to call back with the report information, room number, and time Pt can transport.    JOSE LUIS advised CM of difficulty reaching the facility for ETA on getting Pt DC there. She contacted facility. The concern reported it how the Pt is weaned so quickly and is the Pt O2 requirements going to fluxuate or stay even.     JOSE LUIS emailed vitals report to Ashlyn so the DON can review. Spoke with NP to update. Waiting to hear back.    JOSE LUIS spoke with Pt daughter regarding issues with the facility. She reported she wants to wait for them to be able to take this Pt. Reported she should have other choices prepared as it is possible they may now say no.    JOSE LUIS advised by supervisor to send out more referrals as [place] is not calling JOSE LUIS or CM back to report yes or no. Sent to: [facility], [facility], [facility], and [facility].    Annie Payan LMSW  Neurocritical Care   [facility]  [id number]

## 2017-12-28 NOTE — PROGRESS NOTES
[facility]  Neurocritical Care  Progress Note    Admit Date: 12/17/2017  Service Date: 12/28/2017  Length of Stay: 11    Subjective:     Chief Complaint: Seizure    History of Present Illness: Mr dickerson is a  is a 67 y.o. 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    who presents to [facility] for evaluation of new onset Seizure activity. He presented to outside ED with seizure activity for approximately 20 minutes. Per EMS, he was diaphoretic on arrival and tachycardic. Per nursing home, pt is nonverbal at baseline, but can focus on the speaker. He has a trach in place. He is being admitted to [facility] for a higher level of care.     Hospital Course: 12/17: admit to [facility], EEG pending, Keppra 1 G  at OSH and 500 Q 12 started, CTH: No acute process   12/18:increase keppra per epilepsy, sputum culture, trend procal  12/19: start RISS, discussed with daughter at bedside, not happy with Noa.  12/20: Pending to wean off the vent. Pending placement. Last EEG : L-PLEDS. The patient is very contracted. S/p trach placement.01/2017.  12/21: Pending to wean off the vent. And placement. Last EEG : L-PLEDS.  Yesterday Vanc was D/C and we left him on Zosyn. He was placed on isolation for E.Coli ESBL. S/P trach / PEG. Rehab consult to assess for Botox  injections for spasticity. Tolerating trach collar.   12/22: No clinical changes. Patient on day 2 of trach collar. ABG OK. Started on Dantrolene and pending to wean Baclofen off..  12/23 - no acute events  12/24 Pt continues to be stable. No significant events today. Will continue to monitor closely  12/25: Pending Ltac/Snf transfer on 12/26. If unable to place, will step down to internal Medicine.  12/26: Pending placement   12/27: Transfer to LTAC   12/28: Nursing home will not accept patient due to trach collar, will continue to search for placement     Interval History:  No significant events over  night pending placement     Review of Systems     Unable to obtain a complete ROS due to level of consciousness.  Objective:     Vitals:  Temp: 98.9 °F (37.2 °C) (12/28/17 1100)  Pulse: 101 (12/28/17 1500)  Resp: 19 (12/28/17 1500)  BP: 111/69 (12/28/17 1500)  SpO2: 97 % (12/28/17 1500)    Temp:  [98 °F (36.7 °C)-98.9 °F (37.2 °C)] 98.9 °F (37.2 °C)  Pulse:  [] 101  Resp:  [16-24] 19  SpO2:  [95 %-100 %] 97 %  BP: (111-157)/(69-93) 111/69         Oxygen Concentration (%):  [28-35] 28 12/27 0701 - 12/28 0700  In: 2140 [I.V.:120]  Out: 755 [Urine:625; Drains:130]    Physical Exam         Physical Exam:  GA: chronic illness  no acute distress.   HEENT: No scleral icterus or JVD.   Pulmonary: Clear to auscultation A/P/L.   Cardiac: RRR S1 & S2 w/o rubs/murmurs/gallops.   Abdominal: Bowel sounds present x 4.   Skin: No jaundice, rashes, or visible lesions.  Neuro:  --GCS: E4 V1 M1 baseline   --Mental Status:  Opens eyes spontaneously does not follow commands   --Corneal reflex, gag, cough intact.  --contracted x 4 ext   Unable to test orientation, language, memory, coordination, gait due to level of consciousness.    Medications:  Continuous Scheduled    aspirin 81 mg Daily   atorvastatin 40 mg Daily   B-complex with vitamin C 1 tablet Daily   baclofen 5 mg TID   bethanechol 10 mg Q8H   dantrolene 25 mg Q8H   donepezil 5 mg Daily   enoxparin 40 mg Q24H   famotidine 20 mg BID   finasteride 5 mg Daily   FLUoxetine 20 mg Daily   levetiracetam oral soln 1,000 mg BID   levothyroxine 75 mcg Daily   mupirocin  BID   polyethylene glycol 17 g Daily   senna-docusate 8.6-50 mg 1 tablet Daily   tamsulosin 0.4 mg BID   white petrolatum-mineral oil  QHS   PRN    acetaminophen 650 mg Q4H PRN   dextrose 50% 12.5 g PRN   glucagon (human recombinant) 1 mg PRN   insulin aspart 0-5 Units Q4H PRN   magnesium oxide 800 mg PRN   magnesium oxide 800 mg PRN   ondansetron 4 mg Q8H PRN   potassium chloride 10% 40 mEq PRN   potassium  chloride 10% 40 mEq PRN   potassium chloride 10% 60 mEq PRN   potassium, sodium phosphates 2 packet PRN   potassium, sodium phosphates 2 packet PRN   potassium, sodium phosphates 2 packet PRN   sodium chloride 0.9% 3 mL PRN     Today I personally reviewed pertinent medications, lines/drains/airways, imaging, lab results,     Assessment/Plan:     Neuro   * Seizure    - epilepsy following  -keppra  1g BID  -No epileptiform activity 12/17        Toxic encephalopathy    - secondary to pneumonia, uti, dehydration        Cerebrovascular accident (CVA) due to thrombosis of left carotid artery    Previous stroke in 2016  ASA and statin restarted for secondary stroke prevention         Right spastic hemiparesis    From previous L MCA stroke  Continue baclofen x2 more days, then discontinue  Continue dantrolene         Aphasia    - Hx  prior Stroke  TF        Pulmonary   Pneumonia due to infectious organism    POA, Providencia stuartii Proteus mirabalis   Zosyn course complete  Afebrile, no leukocytosis   Chest PT  Tolerating trach collar @ 5L           Cardiac/Vascular   HTN (hypertension)    - SBP < 180   - PRN Labetalol         Renal/   Urinary retention    Continue Proscar/bethanecol        Acute cystitis without hematuria    POA  Continue vanc and zosyn  Condom cath        CKD (chronic kidney disease) stage 3, GFR 30-59 ml/min    Daily CMP  Enteral water boluses 200 q6h        Endocrine   Hypothyroidism    75 mcg levothyroxine daily         GI   Dysphagia    - NPO  -  meds per peg   -tube feeds        Other   Spasticity    --continue Baclofen 10 mg TID             Prophylaxis:  Venous Thromboembolism: mechanical chemical  Stress Ulcer: H2B  Ventilator Pneumonia: not applicable     Activity Orders          Ambulate with assistance starting at 12/17 2539        Full Code    Daniel Crandall NP  Neurocritical Care  [facility]

## 2017-12-28 NOTE — SUBJECTIVE & OBJECTIVE
Interval History:  No significant events over night pending placement     Review of Systems     Unable to obtain a complete ROS due to level of consciousness.  Objective:     Vitals:  Temp: 98.9 °F (37.2 °C) (12/28/17 1100)  Pulse: 101 (12/28/17 1500)  Resp: 19 (12/28/17 1500)  BP: 111/69 (12/28/17 1500)  SpO2: 97 % (12/28/17 1500)    Temp:  [98 °F (36.7 °C)-98.9 °F (37.2 °C)] 98.9 °F (37.2 °C)  Pulse:  [] 101  Resp:  [16-24] 19  SpO2:  [95 %-100 %] 97 %  BP: (111-157)/(69-93) 111/69         Oxygen Concentration (%):  [28-35] 28    12/27 0701 - 12/28 0700  In: 2140 [I.V.:120]  Out: 755 [Urine:625; Drains:130]    Physical Exam         Physical Exam:  GA: chronic illness  no acute distress.   HEENT: No scleral icterus or JVD.   Pulmonary: Clear to auscultation A/P/L.   Cardiac: RRR S1 & S2 w/o rubs/murmurs/gallops.   Abdominal: Bowel sounds present x 4.   Skin: No jaundice, rashes, or visible lesions.  Neuro:  --GCS: E4 V1 M1 baseline   --Mental Status:  Opens eyes spontaneously does not follow commands   --Corneal reflex, gag, cough intact.  --contracted x 4 ext   Unable to test orientation, language, memory, coordination, gait due to level of consciousness.    Medications:  Continuous Scheduled    aspirin 81 mg Daily   atorvastatin 40 mg Daily   B-complex with vitamin C 1 tablet Daily   baclofen 5 mg TID   bethanechol 10 mg Q8H   dantrolene 25 mg Q8H   donepezil 5 mg Daily   enoxparin 40 mg Q24H   famotidine 20 mg BID   finasteride 5 mg Daily   FLUoxetine 20 mg Daily   levetiracetam oral soln 1,000 mg BID   levothyroxine 75 mcg Daily   mupirocin  BID   polyethylene glycol 17 g Daily   senna-docusate 8.6-50 mg 1 tablet Daily   tamsulosin 0.4 mg BID   white petrolatum-mineral oil  QHS   PRN    acetaminophen 650 mg Q4H PRN   dextrose 50% 12.5 g PRN   glucagon (human recombinant) 1 mg PRN   insulin aspart 0-5 Units Q4H PRN   magnesium oxide 800 mg PRN   magnesium oxide 800 mg PRN   ondansetron 4 mg Q8H PRN    potassium chloride 10% 40 mEq PRN   potassium chloride 10% 40 mEq PRN   potassium chloride 10% 60 mEq PRN   potassium, sodium phosphates 2 packet PRN   potassium, sodium phosphates 2 packet PRN   potassium, sodium phosphates 2 packet PRN   sodium chloride 0.9% 3 mL PRN     Today I personally reviewed pertinent medications, lines/drains/airways, imaging, lab results,

## 2017-12-28 NOTE — PROGRESS NOTES
[facility]  Adult Nutrition  Progress Note    SUMMARY     Recommendations    Recommendation/Intervention:   Pt with 50lb weight loss over past 11 months. Pt with generalized wasting. Recommend increasing TF.   Isosource 1.5 @ 45mL/hr.   -Hold for residuals >500mL.     RD to monitor.      Goals: Meet % EEN, EPN  Nutrition Goal Status: goal met  Communication of RD Recs: reviewed with RN    Reason for Assessment    Reason for Assessment: RD follow-up  Diagnosis: other (see comments) (Seizure)  Relevent Medical History: HTN, PEG placement (12/2016)   Interdisciplinary Rounds: did not attend     General Information Comments: Pt remains on trach collar. Pt nonverbal, does not follow commands. Per chart review, pt with 50lb weight loss since last admission 11 months ago. No family at bedside.    Nutrition Discharge Planning: optimal nutrition via PEG with tolerance    Nutrition Prescription Ordered    Current Diet Order: NPO  Nutrition Order Comments: TF at goal  Current Nutrition Support Formula Ordered: Isosource 1.5  Current Nutrition Support Rate Ordered: 40 (ml)  Current Nutrition Support Frequency Ordered: mL/hr        Evaluation of Received Nutrients/Fluid Intake    Enteral Calories (kcal): 1440  Enteral Protein (gm): 65  Enteral (Free Water) Fluid (mL): 733  Free Water Flush Fluid (mL): 800      Energy Calories Required: meeting needs  % Kcal Needs: 92      Protein Required: meeting needs  % Protein Needs: 100     IV Fluid (mL): 0  I/O: +I/O, low UOP         Fluid Required: meeting needs  Comments: 20mL residuals 12/28  Tolerance: tolerating  % Intake of Estimated Energy Needs: 75 - 100 %  % Meal Intake: NPO     Nutrition Risk Screen     Nutrition Risk Screen: tube feeding or parenteral nutrition, other (see comments) (trach)    Nutrition/Diet History    Patient Reported Diet/Restrictions/Preferences: other (see comments) (SHELLY)  Typical Food/Fluid Intake: TF at goal, tolerating  "appropriately per nsg.  Food Preferences: SHELLY [employer]/cultural food preferences.        Factors Affecting Nutritional Intake: impaired cognitive status/motor control, NPO, chewing difficulties/inability to chew food, difficulty/impaired swallowing                Labs/Tests/Procedures/Meds       Pertinent Labs Reviewed: reviewed, pertinent  Pertinent Labs Comments: noted  Pertinent Medications Reviewed: reviewed, pertinent  Pertinent Medications Comments: B complex with Vit C, insulin    Physical Findings    Overall Physical Appearance: generalized wasting, other (see comments) (trach collar)  Tubes: gastrostomy tube  Oral/Mouth Cavity: WDL  Skin: intact    Anthropometrics    Temp: 98 °F (36.7 °C)     Height: 5' 5" (165.1 cm) (Obtained from chart review)  Weight Method: Bed Scale  Weight: 54.4 kg (119 lb 14.9 oz)  Ideal Body Weight (IBW), Male: 136 lb     % Ideal Body Weight, Male (lb): 88.18 lb     BMI (Calculated): 20  BMI Grade: 18.5-24.9 - normal     Usual Body Weight (UBW), k.6 kg     % Usual Body Weight: 70.25  % Weight Change From Usual Weight: -29.9 %             Estimated/Assessed Needs    Weight Used For Calorie Calculations: 54.4 kg (119 lb 14.9 oz)      Energy Calorie Requirements (kcal): 1563 kcal/d  Energy Need Method: [place]-St Jeor (1.25 PAL)        RMR ([place]-St. Jeor Equation): 1250.88        Weight Used For Protein Calculations: 54.4 kg (119 lb 14.9 oz)  Protein Requirements: 55-65 g/d (1-1.2  g/kg)       Fluid Need Method: RDA Method, other (see comments) (Per MD or 1 mL/kcal)  RDA Method (mL): 1563               Assessment and Plan    Nutrition Problem  Inadequate energy intake     Related to (etiology):   Inability to consume sufficient energy     Signs and Symptoms (as evidenced by):   NPO with no alternate means of nutrition.     Nutrition Diagnosis Status:   Resolved      Monitor and Evaluation    Food and Nutrient Intake: enteral nutrition intake  Food and Nutrient Adminstration: " enteral and parenteral nutrition administration     Physical Activity and Function: nutrition-related ADLs and IADLs  Anthropometric Measurements: weight, weight change  Biochemical Data, Medical Tests and Procedures: lipid profile, inflammatory profile, glucose/endocrine profile, gastrointestinal profile, electrolyte and renal panel  Nutrition-Focused Physical Findings: overall appearance    Nutrition Risk    Level of Risk: other (see comments) (f/u 1x/week)    Nutrition Follow-Up    RD Follow-up?: Yes

## 2017-12-28 NOTE — RESIDENT HANDOFF
Neuro Critical Care Transfer of Care note    Date of Admit: 12/17/2017  Date of Transfer / Stepdown: 12/28/2017    Brief History of Present Illness:      Enrique Dickerson is a 67 y.o. male who  has a past medical history of Allergy; Dermatomyositis (june 2012); Hypertension; Memory loss; Stenosis of left internal carotid artery with cerebral infarction; and Stroke.. The patient presented to [facility] on 12/17/2017 with a primary complaint of No chief complaint on file.    History of Present Illness: Mr dickerson is a  is a 67 y.o. 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    who presents to [facility] for evaluation of new onset Seizure activity. He presented to outside ED with seizure activity for approximately 20 minutes. Per EMS, he was diaphoretic on arrival and tachycardic. Per nursing home, pt is nonverbal at baseline, but can focus on the speaker. He has a trach in place. He is being admitted to [facility] for a higher level of care.      Hospital Course: 12/17: admit to [facility], EEG pending, Keppra 1 G  at OSH and 500 Q 12 started, CTH: No acute process   12/18:increase keppra per epilepsy, sputum culture, trend procal  12/19: start RISS, discussed with daughter at bedside, not happy with Noa.  12/20: Pending to wean off the vent. Pending placement. Last EEG : L-PLEDS. The patient is very contracted. S/p trach placement.01/2017.  12/21: Pending to wean off the vent. And placement. Last EEG : L-PLEDS.  Yesterday Vanc was D/C and we left him on Zosyn. He was placed on isolation for E.Coli ESBL. S/P trach / PEG. Rehab consult to assess for Botox  injections for spasticity. Tolerating trach collar.   12/22: No clinical changes. Patient on day 2 of trach collar. ABG OK. Started on Dantrolene and pending to wean Baclofen off..  12/23 - no acute events  12/24 Pt continues to be stable. No significant events today. Will continue to monitor closely  12/25:  Pending Ltac/Snf transfer on 12/26. If unable to place, will step down to internal Medicine.  12/26: Pending placement   12/27: Transfer to LTAC       Hospital Course By Problem with Pertinent Findings:     1.   Neuro   * Seizure     - epilepsy following  -keppra  1g BID  -No epileptiform activity 12/17       Toxic encephalopathy     - secondary to pneumonia, uti, dehydration       Cerebrovascular accident (CVA) due to thrombosis of left carotid artery     Previous stroke in 2016  ASA and statin restarted for secondary stroke prevention        Right spastic hemiparesis     From previous L MCA stroke  Continue baclofen x2 more days, then discontinue  Continue dantrolene        Aphasia     - Hx  prior Stroke  TF       Pulmonary   Pneumonia due to infectious organism     POA, Providencia stuartii Proteus mirabalis   Zosyn course complete  Afebrile, no leukocytosis   Chest PT  Tolerating trach collar           Cardiac/Vascular   HTN (hypertension)     - SBP < 180   - PRN Labetalol        Renal/   Urinary retention     Continue Proscar/bethanecol       Acute cystitis without hematuria     POA  Continue vanc and zosyn  Condom cath       CKD (chronic kidney disease) stage 3, GFR 30-59 ml/min     Daily CMP  Enteral water boluses 200 q6h       Endocrine   Hypothyroidism     75 mcg levothyroxine daily        GI   Dysphagia     - NPO  -  meds per peg   -tube feeds       Other   Spasticity     --continue Baclofen 10 mg TID          Consultants and Procedures:     Consultants:  Epilepsy     Procedures:    EEG     Transfer Information:     Diet:  Isosource 1.5 @ 40     Physical Activity:  As tolerated       To Do / Pending Studies / Follow ups:    Continue to find placement     Daniel Crandall  Neuro Crtical Care

## 2017-12-28 NOTE — PLAN OF CARE
Problem: Patient Care Overview  Goal: Plan of Care Review  Outcome: Ongoing (interventions implemented as appropriate)  POC reviewed with pt and daughter (by phone) at 1400. Daughter verbalized understanding. Questions and concerns addressed. Advised daughter to call infection control at [phone] with questions about contact isolation so she can potentially come visit tomorrow. No acute events today. Pt progressing toward goals. Will continue to monitor. See flowsheets for full assessment and VS info.

## 2017-12-29 LAB
ALBUMIN SERPL BCP-MCNC: 2.3 G/DL
ALP SERPL-CCNC: 160 U/L
ALT SERPL W/O P-5'-P-CCNC: 36 U/L
ANION GAP SERPL CALC-SCNC: 5 MMOL/L
AST SERPL-CCNC: 31 U/L
BASOPHILS # BLD AUTO: 0.03 K/UL
BASOPHILS NFR BLD: 0.3 %
BILIRUB SERPL-MCNC: 0.3 MG/DL
BUN SERPL-MCNC: 38 MG/DL
CALCIUM SERPL-MCNC: 8.8 MG/DL
CHLORIDE SERPL-SCNC: 110 MMOL/L
CO2 SERPL-SCNC: 23 MMOL/L
CREAT SERPL-MCNC: 1 MG/DL
DIFFERENTIAL METHOD: ABNORMAL
EOSINOPHIL # BLD AUTO: 0.3 K/UL
EOSINOPHIL NFR BLD: 2.8 %
ERYTHROCYTE [DISTWIDTH] IN BLOOD BY AUTOMATED COUNT: 16.4 %
EST. GFR  (AFRICAN AMERICAN): >60 ML/MIN/1.73 M^2
EST. GFR  (NON AFRICAN AMERICAN): >60 ML/MIN/1.73 M^2
GLUCOSE SERPL-MCNC: 107 MG/DL
HCT VFR BLD AUTO: 29.2 %
HGB BLD-MCNC: 9.5 G/DL
IMM GRANULOCYTES # BLD AUTO: 0.06 K/UL
IMM GRANULOCYTES NFR BLD AUTO: 0.6 %
LYMPHOCYTES # BLD AUTO: 0.9 K/UL
LYMPHOCYTES NFR BLD: 9.7 %
MAGNESIUM SERPL-MCNC: 1.9 MG/DL
MCH RBC QN AUTO: 31.1 PG
MCHC RBC AUTO-ENTMCNC: 32.5 G/DL
MCV RBC AUTO: 96 FL
MONOCYTES # BLD AUTO: 0.9 K/UL
MONOCYTES NFR BLD: 9.2 %
NEUTROPHILS # BLD AUTO: 7.3 K/UL
NEUTROPHILS NFR BLD: 77.4 %
NRBC BLD-RTO: 0 /100 WBC
PHOSPHATE SERPL-MCNC: 3.2 MG/DL
PLATELET # BLD AUTO: 218 K/UL
PMV BLD AUTO: 12 FL
POCT GLUCOSE: 105 MG/DL (ref 70–110)
POCT GLUCOSE: 107 MG/DL (ref 70–110)
POCT GLUCOSE: 112 MG/DL (ref 70–110)
POCT GLUCOSE: 114 MG/DL (ref 70–110)
POTASSIUM SERPL-SCNC: 3.9 MMOL/L
PROT SERPL-MCNC: 7.5 G/DL
RBC # BLD AUTO: 3.05 M/UL
SODIUM SERPL-SCNC: 136 MMOL/L
SODIUM SERPL-SCNC: 138 MMOL/L
SODIUM SERPL-SCNC: 138 MMOL/L
WBC # BLD AUTO: 9.39 K/UL

## 2017-12-29 PROCEDURE — 25000003 PHARM REV CODE 250: Performed by: NURSE PRACTITIONER

## 2017-12-29 PROCEDURE — 63600175 PHARM REV CODE 636 W HCPCS: Performed by: PHYSICIAN ASSISTANT

## 2017-12-29 PROCEDURE — 94761 N-INVAS EAR/PLS OXIMETRY MLT: CPT

## 2017-12-29 PROCEDURE — 97530 THERAPEUTIC ACTIVITIES: CPT

## 2017-12-29 PROCEDURE — 99232 SBSQ HOSP IP/OBS MODERATE 35: CPT | Mod: ,,, | Performed by: NURSE PRACTITIONER

## 2017-12-29 PROCEDURE — 27000221 HC OXYGEN, UP TO 24 HOURS

## 2017-12-29 PROCEDURE — 11000001 HC ACUTE MED/SURG PRIVATE ROOM

## 2017-12-29 PROCEDURE — 36415 COLL VENOUS BLD VENIPUNCTURE: CPT

## 2017-12-29 PROCEDURE — 99900026 HC AIRWAY MAINTENANCE (STAT)

## 2017-12-29 PROCEDURE — 25000003 PHARM REV CODE 250: Performed by: PHYSICIAN ASSISTANT

## 2017-12-29 PROCEDURE — 85025 COMPLETE CBC W/AUTO DIFF WBC: CPT

## 2017-12-29 PROCEDURE — 25000003 PHARM REV CODE 250: Performed by: PSYCHIATRY & NEUROLOGY

## 2017-12-29 PROCEDURE — 84295 ASSAY OF SERUM SODIUM: CPT | Mod: 91

## 2017-12-29 PROCEDURE — 80053 COMPREHEN METABOLIC PANEL: CPT

## 2017-12-29 PROCEDURE — 83735 ASSAY OF MAGNESIUM: CPT

## 2017-12-29 PROCEDURE — 84100 ASSAY OF PHOSPHORUS: CPT

## 2017-12-29 RX ADMIN — DANTROLENE SODIUM 25 MG: 25 CAPSULE ORAL at 02:12

## 2017-12-29 RX ADMIN — FAMOTIDINE 20 MG: 20 TABLET, FILM COATED ORAL at 09:12

## 2017-12-29 RX ADMIN — MUPIROCIN: 20 OINTMENT TOPICAL at 09:12

## 2017-12-29 RX ADMIN — ATORVASTATIN CALCIUM 40 MG: 20 TABLET, FILM COATED ORAL at 08:12

## 2017-12-29 RX ADMIN — DANTROLENE SODIUM 25 MG: 25 CAPSULE ORAL at 09:12

## 2017-12-29 RX ADMIN — FLUOXETINE 20 MG: 20 CAPSULE ORAL at 08:12

## 2017-12-29 RX ADMIN — BACLOFEN 5 MG: 10 TABLET ORAL at 09:12

## 2017-12-29 RX ADMIN — MINERAL OIL AND WHITE PETROLATUM: 150; 830 OINTMENT OPHTHALMIC at 09:12

## 2017-12-29 RX ADMIN — DONEPEZIL HYDROCHLORIDE 5 MG: 5 TABLET, FILM COATED ORAL at 08:12

## 2017-12-29 RX ADMIN — TAMSULOSIN HYDROCHLORIDE 0.4 MG: 0.4 CAPSULE ORAL at 08:12

## 2017-12-29 RX ADMIN — DANTROLENE SODIUM 25 MG: 25 CAPSULE ORAL at 06:12

## 2017-12-29 RX ADMIN — BETHANECHOL CHLORIDE 10 MG: 10 TABLET ORAL at 01:12

## 2017-12-29 RX ADMIN — LEVETIRACETAM 1000 MG: 100 SOLUTION ORAL at 09:12

## 2017-12-29 RX ADMIN — LEVETIRACETAM 1000 MG: 100 SOLUTION ORAL at 08:12

## 2017-12-29 RX ADMIN — STANDARDIZED SENNA CONCENTRATE AND DOCUSATE SODIUM 1 TABLET: 8.6; 5 TABLET, FILM COATED ORAL at 08:12

## 2017-12-29 RX ADMIN — BETHANECHOL CHLORIDE 10 MG: 10 TABLET ORAL at 06:12

## 2017-12-29 RX ADMIN — TAMSULOSIN HYDROCHLORIDE 0.4 MG: 0.4 CAPSULE ORAL at 09:12

## 2017-12-29 RX ADMIN — ENOXAPARIN SODIUM 40 MG: 100 INJECTION SUBCUTANEOUS at 05:12

## 2017-12-29 RX ADMIN — FAMOTIDINE 20 MG: 20 TABLET, FILM COATED ORAL at 08:12

## 2017-12-29 RX ADMIN — BETHANECHOL CHLORIDE 10 MG: 10 TABLET ORAL at 10:12

## 2017-12-29 RX ADMIN — BACLOFEN 5 MG: 10 TABLET ORAL at 02:12

## 2017-12-29 RX ADMIN — LEVOTHYROXINE SODIUM 75 MCG: 75 TABLET ORAL at 08:12

## 2017-12-29 RX ADMIN — BACLOFEN 5 MG: 10 TABLET ORAL at 06:12

## 2017-12-29 RX ADMIN — VITAMIN C 1 TABLET: TAB at 08:12

## 2017-12-29 RX ADMIN — POLYETHYLENE GLYCOL 3350 17 G: 17 POWDER, FOR SOLUTION ORAL at 08:12

## 2017-12-29 RX ADMIN — FINASTERIDE 5 MG: 5 TABLET, FILM COATED ORAL at 09:12

## 2017-12-29 RX ADMIN — ASPIRIN 81 MG CHEWABLE TABLET 81 MG: 81 TABLET CHEWABLE at 08:12

## 2017-12-29 NOTE — PLAN OF CARE
[facility] Step Down Acceptance Note        Accepting Physician: Dr. Severino     Date of acceptance: 12/29/2017     Continued inpatient needs : Placement     Report from Referring Provider:   67 y.o. Man with a complicated  hx of LICA stenosis and CVA with trach and PEG in place admitted with new onset seizures. NH resident. Has been stable for discharge for past few days awaiting LTACH. Was initially accepted to a facility ([place]); however facility seems now unwilling to take the patient as they expressed some concerns about his oxygen requirements (pt on trach collar at 5L ). Social work has attempted to place pt in this facility over the last 2 days without success. Given difficulty, new referrals sent out today thus placement will likely occur w/in the next 3-5 days..      To Do List:   · Awaiting placement. New referrals being sent out today.  If by some chance, initial accepting facility does accept the pt tomorrow morning - OK to call N[facility] team to discharge.    · See transfer note for further details.       Once stepped down to med/surg bed , Hospital Medicine will take over care of the patient at 7am.         Shalini Severino MD  Attending Staff Physician   Hospital Medicine

## 2017-12-29 NOTE — PROGRESS NOTES
Patient admitted to 9th floor from neuro ICU. Report received from AUDRA Garcia. Vital signs stable. Patient on continuous tube feedings. Patient lying comfortably in bed. Will continue to monitor.

## 2017-12-29 NOTE — PROGRESS NOTES
[facility]  Neurocritical Care  Progress Note    Admit Date: 12/17/2017  Service Date: 12/29/2017  Length of Stay: 12    Subjective:     Chief Complaint: Seizure    History of Present Illness: Mr dickerson is a  is a 67 y.o. 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    who presents to [facility] for evaluation of new onset Seizure activity. He presented to outside ED with seizure activity for approximately 20 minutes. Per EMS, he was diaphoretic on arrival and tachycardic. Per nursing home, pt is nonverbal at baseline, but can focus on the speaker. He has a trach in place. He is being admitted to [facility] for a higher level of care.     Hospital Course: 12/17: admit to [facility], EEG pending, Keppra 1 G  at OSH and 500 Q 12 started, CTH: No acute process   12/18:increase keppra per epilepsy, sputum culture, trend procal  12/19: start RISS, discussed with daughter at bedside, not happy with Noa.  12/20: Pending to wean off the vent. Pending placement. Last EEG : L-PLEDS. The patient is very contracted. S/p trach placement.01/2017.  12/21: Pending to wean off the vent. And placement. Last EEG : L-PLEDS.  Yesterday Vanc was D/C and we left him on Zosyn. He was placed on isolation for E.Coli ESBL. S/P trach / PEG. Rehab consult to assess for Botox  injections for spasticity. Tolerating trach collar.   12/22: No clinical changes. Patient on day 2 of trach collar. ABG OK. Started on Dantrolene and pending to wean Baclofen off..  12/23 - no acute events  12/24 Pt continues to be stable. No significant events today. Will continue to monitor closely  12/25: Pending Ltac/Snf transfer on 12/26. If unable to place, will step down to internal Medicine.  12/26: Pending placement   12/27: Transfer to LTAC   12/28: Nursing home will not accept patient due to trach collar, will continue to search for placement     Interval History:  No significant events over  night pending placement     Review of Systems     Unable to obtain a complete ROS due to level of consciousness.  Objective:     Vitals:  Temp: 98.3 °F (36.8 °C) (12/29/17 1200)  Pulse: 99 (12/29/17 1200)  Resp: (!) 24 (12/29/17 1200)  BP: 133/79 (12/29/17 1200)  SpO2: 96 % (12/29/17 1200)    Temp:  [98 °F (36.7 °C)-99 °F (37.2 °C)] 98.3 °F (36.8 °C)  Pulse:  [] 99  Resp:  [16-29] 24  SpO2:  [93 %-100 %] 96 %  BP: (104-144)/(69-86) 133/79         Oxygen Concentration (%):  [28] 28    12/28 0701 - 12/29 0700  In: 1840 [I.V.:100]  Out: 975 [Urine:975]    Physical Exam         Physical Exam:  GA: chronic illness  no acute distress.   HEENT: No scleral icterus or JVD.   Pulmonary: Clear to auscultation A/P/L.   Cardiac: RRR S1 & S2 w/o rubs/murmurs/gallops.   Abdominal: Bowel sounds present x 4.   Skin: No jaundice, rashes, or visible lesions.  Neuro:  --GCS: E4 V1 M1 baseline   --Mental Status:  Opens eyes spontaneously does not follow commands   --Corneal reflex, gag, cough intact.  --contracted x 4 ext   Unable to test orientation, language, memory, coordination, gait due to level of consciousness.    Medications:  Continuous Scheduled    aspirin 81 mg Daily   atorvastatin 40 mg Daily   B-complex with vitamin C 1 tablet Daily   baclofen 5 mg TID   bethanechol 10 mg Q8H   dantrolene 25 mg Q8H   donepezil 5 mg Daily   enoxparin 40 mg Q24H   famotidine 20 mg BID   finasteride 5 mg Daily   FLUoxetine 20 mg Daily   levetiracetam oral soln 1,000 mg BID   levothyroxine 75 mcg Daily   mupirocin  BID   polyethylene glycol 17 g Daily   senna-docusate 8.6-50 mg 1 tablet Daily   tamsulosin 0.4 mg BID   white petrolatum-mineral oil  QHS   PRN    acetaminophen 650 mg Q4H PRN   dextrose 50% 12.5 g PRN   glucagon (human recombinant) 1 mg PRN   insulin aspart 0-5 Units Q4H PRN   magnesium oxide 800 mg PRN   magnesium oxide 800 mg PRN   ondansetron 4 mg Q8H PRN   potassium chloride 10% 40 mEq PRN   potassium chloride 10% 40 mEq  PRN   potassium chloride 10% 60 mEq PRN   potassium, sodium phosphates 2 packet PRN   potassium, sodium phosphates 2 packet PRN   potassium, sodium phosphates 2 packet PRN   sodium chloride 0.9% 3 mL PRN     Today I personally reviewed pertinent medications, lines/drains/airways, imaging, lab results,     Assessment/Plan:     Neuro   * Seizure    - epilepsy following  -keppra  1g BID  -No epileptiform activity 12/17        Toxic encephalopathy    - secondary to pneumonia, uti, dehydration        Cerebrovascular accident (CVA) due to thrombosis of left carotid artery    Previous stroke in 2016  ASA and statin restarted for secondary stroke prevention         Right spastic hemiparesis    From previous L MCA stroke  Continue baclofen x2 more days, then discontinue  Continue dantrolene         Aphasia    - Hx  prior Stroke  TF        Pulmonary   Pneumonia due to infectious organism    POA, Providencia stuartii Proteus mirabalis   Zosyn course complete  Afebrile, no leukocytosis   Chest PT  Tolerating trach collar @ 5L           Cardiac/Vascular   HTN (hypertension)    - SBP < 180   - PRN Labetalol         Renal/   Urinary retention    Continue Proscar/bethanecol        Acute cystitis without hematuria    POA  Continue vanc and zosyn  Condom cath        CKD (chronic kidney disease) stage 3, GFR 30-59 ml/min    Daily CMP  Enteral water boluses 200 q6h        Endocrine   Hypothyroidism    75 mcg levothyroxine daily         GI   Dysphagia    - NPO  -  meds per peg   -tube feeds        Other   Spasticity    --continue Baclofen 10 mg TID             Prophylaxis:  Venous Thromboembolism: mechanical chemical  Stress Ulcer: H2B  Ventilator Pneumonia: not applicable     Activity Orders          Ambulate with assistance starting at 12/17 6969        Full Code    Daniel Crandall NP  Neurocritical Care  [facility]

## 2017-12-29 NOTE — PROGRESS NOTES
Report given to AUDRA Mercado on neuro step down unit. 2RNs transferred patient to room 922 with all belongings and chart. No acute events during transfer. AUDRA Mercado notified patient arrived to room.

## 2017-12-29 NOTE — PHYSICIAN QUERY
PT Name: Enrique Yancey  MR #: [id number]    Physician Query Form - Nutrition Clarification     CDS/: Yaa Kirk RN, CDS               Contact information: [email or web address]    This form is a permanent document in the medical record.     Query Date: December 29, 2017    By submitting this query, we are merely seeking further clarification of documentation.. Please utilize your independent clinical judgment when addressing the question(s) below.    The Medical record contains the following:   Indicators  Supporting Clinical Findings Location in Medical Record    % of Estimated Energy Intake over a time frame from p.o., TF, or TPN     x Weight Status over a time frame --Pt with 50lb weight loss over past 11 months.    RD Note 12/21   x Subcutaneous Fat and/or Muscle Loss --Pt with generalized wasting.   RD Note 12/21      Fluid Accumulation or Edema      Reduced  Strength     x Wt / BMI / Usual Body Weight Wt: 119lbs  BMI: 20   RD c/s 12/17    Delayed Wound Healing / Failure to Thrive     x Acute or Chronic Illness --Dysphagia       - NPO       -  meds per peg        -tube feeds  --6. Spasticity and contractures on all 4 limbs.    --Cerebrovascular accident (CVA) due to thrombosis of left carotid artery       -Previous stroke in 2016  --Right spastic hemiparesis        -From previous L MCA stroke   NCC Note 12/20            NCC Note 12/28      Medication     x Treatment --Recommend increasing TF.   RD Note 12/28   x Other --PEG placement (12/2016)  --Nutrition Problem  Inadequate energy intake RD C/S 12/17             AND / ASPEN Clinical Characteristics (October 2011)  A minimum of two characteristics is recommended for diagnosing either moderate or severe malnutrition   Mild Malnutrition Moderate Malnutrition Severe Malnutrition   Energy Intake from p.o., TF or TPN. < 75% intake of estimated energy needs for less than 7 days < 75% intake of estimated energy needs for greater than 7 days <  50% intake of estimated energy needs for > 5 days   Weight Loss 1-2% in 1 month  5% in 3 months  7.5% in 6 months  10% in 1 year 1-2 % in 1 week  5% in 1 month  7.5% in 3 months  10% in 6 months  20% in 1 year > 2% in 1 week  > 5% in 1 month  > 7.5% in 3 months  > 10% in 6 months  > 20% in 1 year   Physical Findings     None *Mild subcutaneous fat and/or muscle loss  *Mild fluid accumulation  *Stage II decubitus  *Surgical wound or non-healing wound *Mod/severe subcutaneous fat and/or muscle loss  *Mod/severe fluid accumulation  *Stage III or IV decubitus  *Non-healing surgical wound     Provider, please specify diagnosis or diagnoses associated with above clinical findings.    [ x ] Moderate Protein-Calorie Malnutrition  [ ] Severe Protein-Calorie Malnutrition  [ ] Cachexia  [ ] Abnormal Weight Loss  [ ] Underweight  [ ] Other Nutritional Diagnosis (please specify): ____________________________________  [ ] Other: ________________________________  [ ] Clinically Undetermined    Please document in your progress notes daily for the duration of treatment until resolved and include in your discharge summary.

## 2017-12-29 NOTE — SUBJECTIVE & OBJECTIVE
Interval History:  No significant events over night pending placement     Review of Systems     Unable to obtain a complete ROS due to level of consciousness.  Objective:     Vitals:  Temp: 98.3 °F (36.8 °C) (12/29/17 1200)  Pulse: 99 (12/29/17 1200)  Resp: (!) 24 (12/29/17 1200)  BP: 133/79 (12/29/17 1200)  SpO2: 96 % (12/29/17 1200)    Temp:  [98 °F (36.7 °C)-99 °F (37.2 °C)] 98.3 °F (36.8 °C)  Pulse:  [] 99  Resp:  [16-29] 24  SpO2:  [93 %-100 %] 96 %  BP: (104-144)/(69-86) 133/79         Oxygen Concentration (%):  [28] 28    12/28 0701 - 12/29 0700  In: 1840 [I.V.:100]  Out: 975 [Urine:975]    Physical Exam         Physical Exam:  GA: chronic illness  no acute distress.   HEENT: No scleral icterus or JVD.   Pulmonary: Clear to auscultation A/P/L.   Cardiac: RRR S1 & S2 w/o rubs/murmurs/gallops.   Abdominal: Bowel sounds present x 4.   Skin: No jaundice, rashes, or visible lesions.  Neuro:  --GCS: E4 V1 M1 baseline   --Mental Status:  Opens eyes spontaneously does not follow commands   --Corneal reflex, gag, cough intact.  --contracted x 4 ext   Unable to test orientation, language, memory, coordination, gait due to level of consciousness.    Medications:  Continuous Scheduled    aspirin 81 mg Daily   atorvastatin 40 mg Daily   B-complex with vitamin C 1 tablet Daily   baclofen 5 mg TID   bethanechol 10 mg Q8H   dantrolene 25 mg Q8H   donepezil 5 mg Daily   enoxparin 40 mg Q24H   famotidine 20 mg BID   finasteride 5 mg Daily   FLUoxetine 20 mg Daily   levetiracetam oral soln 1,000 mg BID   levothyroxine 75 mcg Daily   mupirocin  BID   polyethylene glycol 17 g Daily   senna-docusate 8.6-50 mg 1 tablet Daily   tamsulosin 0.4 mg BID   white petrolatum-mineral oil  QHS   PRN    acetaminophen 650 mg Q4H PRN   dextrose 50% 12.5 g PRN   glucagon (human recombinant) 1 mg PRN   insulin aspart 0-5 Units Q4H PRN   magnesium oxide 800 mg PRN   magnesium oxide 800 mg PRN   ondansetron 4 mg Q8H PRN   potassium chloride  10% 40 mEq PRN   potassium chloride 10% 40 mEq PRN   potassium chloride 10% 60 mEq PRN   potassium, sodium phosphates 2 packet PRN   potassium, sodium phosphates 2 packet PRN   potassium, sodium phosphates 2 packet PRN   sodium chloride 0.9% 3 mL PRN     Today I personally reviewed pertinent medications, lines/drains/airways, imaging, lab results,

## 2017-12-29 NOTE — PLAN OF CARE
JOSE LUIS contacted Ashlyn with [facility] regarding this Pt. She reported they wanted to see if the Pt remained stable on this level of O2 as they do not have the capability to go up on the O2 if this is needed. JOSE LUIS reported Pt remains stable on this level and is stepping down. She reported if the information is sent over at 11am, she will review with DON and see if they are ok to take him.    Reviewed [facility] referrals that were sent. [facility] declined.    11:00am: JOSE LUIS printed and emailed the updated vital report, progress notes, and labs to [facility]. Will contact Ashlyn at 11:30am.    11:30am: Left message for Ashlyn regarding documents that were sent.    1:15pm: Spoke with Ashlyn. She reported being in a meeting but did receive email and will finish reviewing it when she is out of her meeting. She will contact this JOSE LUIS when she knows if they can take this Pt.    3:30pm: JOSE LUIS left [facility] envelope inside of chart for possible DC on Tuesday if Anitha (or another NH referral sent out) will take him then.     Annie Payan, RENALDO  Neurocritical Care   [facility]  [id number]

## 2017-12-29 NOTE — PT/OT/SLP PROGRESS
Physical Therapy Treatment/ Discharge Summary     Patient Name:  Enrique Yancey   MRN:  [id number]    Recommendations:     Discharge Recommendations:  nursing facility, basic   Discharge Equipment Recommendations: none   Barriers to discharge: re 24/7 care at current time    Assessment:     Enrique Yancey is a 67 y.o. male admitted with a medical diagnosis of Seizure.  He presents with the following impairments/functional limitations:  weakness, impaired endurance, decreased coordination, impaired balance, decreased upper extremity function, decreased lower extremity function, decreased safety awareness, impaired cardiopulmonary response to activity, impaired joint extensibility. Over last 3 trials, pt with minimal response to PROM; PT unable to educate family on pt current needs. Due to lack of progression and pt with inability to actively participate in session, therapist will discharge pt from acute PT services. PT encourages nsg staff to perform daily PROM as well as to utilize pillows and towels for pt comfort and prevention of further contractures. .    Rehab Prognosis:  poor; patient would benefit from acute skilled PT services to address these deficits and reach maximum level of function.      Recent Surgery: * No surgery found *      Plan:     During this hospitalization, patient to be seen 2 x/week to address the above listed problems via therapeutic activities, therapeutic exercises  · Plan of Care Expires:  01/20/18   Plan of Care Reviewed with: patient, daughter    Subjective     Communicated with nsg prior to session.  Patient found supine in bed upon PT entry to room, agreeable to treatment.      Chief Complaint: nonverbal  Patient comments/goals: nonverbal; unable to state  Pain/Comfort:  · Pain Rating 1: 0/10    Patients cultural, spiritual, Hindu conflicts given the current situation:      Objective:     Patient found with: blood pressure cuff, bowel management system, tyler catheter, PEG Tube,  oxygen, peripheral IV, tracheostomy, telemetry, SCD, pulse ox (continuous)     General Precautions: Standard, fall, seizure, aphasia, aspiration   Orthopedic Precautions:N/A   Braces: N/A     Functional Mobility:  · No mobility performed in today's session.        AM-PAC 6 CLICK MOBILITY  Turning over in bed (including adjusting bedclothes, sheets and blankets)?: 1  Sitting down on and standing up from a chair with arms (e.g., wheelchair, bedside commode, etc.): 1  Moving from lying on back to sitting on the side of the bed?: 1  Moving to and from a bed to a chair (including a wheelchair)?: 1  Need to walk in hospital room?: 1  Climbing 3-5 steps with a railing?: 1  Total Score: 6       Therapeutic Activities and Exercises:     PT performed PROM x 20 reps to (B) UE and (B) LE in available planes; continued hip flexor/knee flexor/elbow flexor contracture identified. Pillows placed for comfort and to aide with further contracture formation.     Patient left HOB elevated with all lines intact and call button in reach..    GOALS:    Physical Therapy Goals     Not on file          Multidisciplinary Problems (Resolved)        Problem: Physical Therapy Goal    Goal Priority Disciplines Outcome Goal Variances Interventions   Physical Therapy Goal   (Resolved)     PT/OT, PT Outcome(s) achieved     Description:  Goals to be met by: 14 days (18)    Patient will increase functional independence with mobility by performin. PT will demonstrate to family PROM technique and distribute handout to assist c/ carryover of activity.  2. PT will perform PROM x 20 reps to assist c/ slowed progression of (B) UE and LE contracture while in hospital.                      Time Tracking:     PT Received On: 17  PT Start Time: 1400     PT Stop Time: 1410  PT Total Time (min): 10 min     Billable Minutes: Therapeutic Activity 10    Treatment Type: Treatment  PT/PTA: PT           Marianna Castro, PT, DPT  2017

## 2017-12-29 NOTE — PLAN OF CARE
Problem: Physical Therapy Goal  Goal: Physical Therapy Goal  Goals to be met by: 14 days (18)    Patient will increase functional independence with mobility by performin. PT will demonstrate to family PROM technique and distribute handout to assist c/ carryover of activity.  2. PT will perform PROM x 20 reps to assist c/ slowed progression of (B) UE and LE contracture while in hospital.     Outcome: Outcome(s) achieved Date Met: 17  No goals met at this time with no progression occurring over 3 visit trial. Will be discharged from acute PT services. Please have [facility] staff perform daily PROM to prevent further contracture formation at this time.    Marianna Casrto, PT, DPT  2017

## 2017-12-30 PROBLEM — N40.1 BENIGN PROSTATIC HYPERPLASIA WITH LOWER URINARY TRACT SYMPTOMS: Status: ACTIVE | Noted: 2017-12-25

## 2017-12-30 PROBLEM — D64.9 NORMOCYTIC ANEMIA: Status: ACTIVE | Noted: 2017-12-30

## 2017-12-30 PROBLEM — N30.00 ACUTE CYSTITIS WITHOUT HEMATURIA: Status: RESOLVED | Noted: 2017-12-19 | Resolved: 2017-12-30

## 2017-12-30 PROBLEM — J18.9 PNEUMONIA DUE TO INFECTIOUS ORGANISM: Status: RESOLVED | Noted: 2017-12-19 | Resolved: 2017-12-30

## 2017-12-30 LAB
ALBUMIN SERPL BCP-MCNC: 2.3 G/DL
ALP SERPL-CCNC: 153 U/L
ALT SERPL W/O P-5'-P-CCNC: 36 U/L
ANION GAP SERPL CALC-SCNC: 6 MMOL/L
AST SERPL-CCNC: 31 U/L
BASOPHILS # BLD AUTO: 0.03 K/UL
BASOPHILS NFR BLD: 0.3 %
BILIRUB SERPL-MCNC: 0.2 MG/DL
BUN SERPL-MCNC: 36 MG/DL
CALCIUM SERPL-MCNC: 9 MG/DL
CHLORIDE SERPL-SCNC: 109 MMOL/L
CO2 SERPL-SCNC: 23 MMOL/L
CREAT SERPL-MCNC: 1.1 MG/DL
DIFFERENTIAL METHOD: ABNORMAL
EOSINOPHIL # BLD AUTO: 0.2 K/UL
EOSINOPHIL NFR BLD: 2 %
ERYTHROCYTE [DISTWIDTH] IN BLOOD BY AUTOMATED COUNT: 16.4 %
EST. GFR  (AFRICAN AMERICAN): >60 ML/MIN/1.73 M^2
EST. GFR  (NON AFRICAN AMERICAN): >60 ML/MIN/1.73 M^2
GLUCOSE SERPL-MCNC: 83 MG/DL
HCT VFR BLD AUTO: 30.8 %
HGB BLD-MCNC: 10 G/DL
IMM GRANULOCYTES # BLD AUTO: 0.04 K/UL
IMM GRANULOCYTES NFR BLD AUTO: 0.4 %
LYMPHOCYTES # BLD AUTO: 0.9 K/UL
LYMPHOCYTES NFR BLD: 9.9 %
MAGNESIUM SERPL-MCNC: 1.9 MG/DL
MCH RBC QN AUTO: 30.7 PG
MCHC RBC AUTO-ENTMCNC: 32.5 G/DL
MCV RBC AUTO: 95 FL
MONOCYTES # BLD AUTO: 1.1 K/UL
MONOCYTES NFR BLD: 11.3 %
NEUTROPHILS # BLD AUTO: 7.1 K/UL
NEUTROPHILS NFR BLD: 76.1 %
NRBC BLD-RTO: 0 /100 WBC
PHOSPHATE SERPL-MCNC: 3.1 MG/DL
PLATELET # BLD AUTO: 221 K/UL
PMV BLD AUTO: 12.4 FL
POCT GLUCOSE: 101 MG/DL (ref 70–110)
POCT GLUCOSE: 103 MG/DL (ref 70–110)
POCT GLUCOSE: 133 MG/DL (ref 70–110)
POCT GLUCOSE: 80 MG/DL (ref 70–110)
POCT GLUCOSE: 91 MG/DL (ref 70–110)
POCT GLUCOSE: 97 MG/DL (ref 70–110)
POTASSIUM SERPL-SCNC: 4 MMOL/L
PROT SERPL-MCNC: 7.7 G/DL
RBC # BLD AUTO: 3.26 M/UL
SODIUM SERPL-SCNC: 138 MMOL/L
WBC # BLD AUTO: 9.32 K/UL

## 2017-12-30 PROCEDURE — 99232 SBSQ HOSP IP/OBS MODERATE 35: CPT | Mod: GC,,, | Performed by: HOSPITALIST

## 2017-12-30 PROCEDURE — 80053 COMPREHEN METABOLIC PANEL: CPT

## 2017-12-30 PROCEDURE — 25000003 PHARM REV CODE 250: Performed by: NURSE PRACTITIONER

## 2017-12-30 PROCEDURE — 83735 ASSAY OF MAGNESIUM: CPT

## 2017-12-30 PROCEDURE — 27000221 HC OXYGEN, UP TO 24 HOURS

## 2017-12-30 PROCEDURE — 63600175 PHARM REV CODE 636 W HCPCS: Performed by: PHYSICIAN ASSISTANT

## 2017-12-30 PROCEDURE — 84100 ASSAY OF PHOSPHORUS: CPT

## 2017-12-30 PROCEDURE — 25000003 PHARM REV CODE 250: Performed by: PSYCHIATRY & NEUROLOGY

## 2017-12-30 PROCEDURE — 25000003 PHARM REV CODE 250: Performed by: PHYSICIAN ASSISTANT

## 2017-12-30 PROCEDURE — 94761 N-INVAS EAR/PLS OXIMETRY MLT: CPT

## 2017-12-30 PROCEDURE — 99900026 HC AIRWAY MAINTENANCE (STAT)

## 2017-12-30 PROCEDURE — 85025 COMPLETE CBC W/AUTO DIFF WBC: CPT

## 2017-12-30 PROCEDURE — 36415 COLL VENOUS BLD VENIPUNCTURE: CPT

## 2017-12-30 PROCEDURE — 11000001 HC ACUTE MED/SURG PRIVATE ROOM

## 2017-12-30 RX ADMIN — ENOXAPARIN SODIUM 40 MG: 100 INJECTION SUBCUTANEOUS at 05:12

## 2017-12-30 RX ADMIN — LEVETIRACETAM 1000 MG: 100 SOLUTION ORAL at 09:12

## 2017-12-30 RX ADMIN — MINERAL OIL AND WHITE PETROLATUM: 150; 830 OINTMENT OPHTHALMIC at 09:12

## 2017-12-30 RX ADMIN — LEVOTHYROXINE SODIUM 75 MCG: 75 TABLET ORAL at 10:12

## 2017-12-30 RX ADMIN — TAMSULOSIN HYDROCHLORIDE 0.4 MG: 0.4 CAPSULE ORAL at 10:12

## 2017-12-30 RX ADMIN — VITAMIN C 1 TABLET: TAB at 10:12

## 2017-12-30 RX ADMIN — FINASTERIDE 5 MG: 5 TABLET, FILM COATED ORAL at 10:12

## 2017-12-30 RX ADMIN — ATORVASTATIN CALCIUM 40 MG: 20 TABLET, FILM COATED ORAL at 10:12

## 2017-12-30 RX ADMIN — DANTROLENE SODIUM 25 MG: 25 CAPSULE ORAL at 01:12

## 2017-12-30 RX ADMIN — DANTROLENE SODIUM 25 MG: 25 CAPSULE ORAL at 05:12

## 2017-12-30 RX ADMIN — STANDARDIZED SENNA CONCENTRATE AND DOCUSATE SODIUM 1 TABLET: 8.6; 5 TABLET, FILM COATED ORAL at 10:12

## 2017-12-30 RX ADMIN — BACLOFEN 5 MG: 10 TABLET ORAL at 05:12

## 2017-12-30 RX ADMIN — FLUOXETINE 20 MG: 20 CAPSULE ORAL at 10:12

## 2017-12-30 RX ADMIN — MUPIROCIN: 20 OINTMENT TOPICAL at 09:12

## 2017-12-30 RX ADMIN — DONEPEZIL HYDROCHLORIDE 5 MG: 5 TABLET, FILM COATED ORAL at 10:12

## 2017-12-30 RX ADMIN — FAMOTIDINE 20 MG: 20 TABLET, FILM COATED ORAL at 10:12

## 2017-12-30 RX ADMIN — ASPIRIN 81 MG CHEWABLE TABLET 81 MG: 81 TABLET CHEWABLE at 10:12

## 2017-12-30 RX ADMIN — BETHANECHOL CHLORIDE 10 MG: 10 TABLET ORAL at 01:12

## 2017-12-30 RX ADMIN — MUPIROCIN: 20 OINTMENT TOPICAL at 10:12

## 2017-12-30 RX ADMIN — BETHANECHOL CHLORIDE 10 MG: 10 TABLET ORAL at 09:12

## 2017-12-30 RX ADMIN — BACLOFEN 5 MG: 10 TABLET ORAL at 01:12

## 2017-12-30 RX ADMIN — FAMOTIDINE 20 MG: 20 TABLET, FILM COATED ORAL at 09:12

## 2017-12-30 RX ADMIN — DANTROLENE SODIUM 25 MG: 25 CAPSULE ORAL at 09:12

## 2017-12-30 RX ADMIN — BACLOFEN 5 MG: 10 TABLET ORAL at 09:12

## 2017-12-30 RX ADMIN — TAMSULOSIN HYDROCHLORIDE 0.4 MG: 0.4 CAPSULE ORAL at 09:12

## 2017-12-30 RX ADMIN — LEVETIRACETAM 1000 MG: 100 SOLUTION ORAL at 10:12

## 2017-12-30 RX ADMIN — BETHANECHOL CHLORIDE 10 MG: 10 TABLET ORAL at 05:12

## 2017-12-30 NOTE — SUBJECTIVE & OBJECTIVE
Interval History:   No acute events overnight. Patient did have fever reported of 100.4 at 0349 that resolved without therapy.    Review of Systems   Unable to perform ROS: Patient nonverbal     Objective:     Vital Signs (Most Recent):  Temp: 99 °F (37.2 °C) (12/30/17 1152)  Pulse: 94 (12/30/17 1152)  Resp: 20 (12/30/17 1152)  BP: 118/66 (12/30/17 1152)  SpO2: 96 % (12/30/17 1152) Vital Signs (24h Range):  Temp:  [96.5 °F (35.8 °C)-100.4 °F (38 °C)] 99 °F (37.2 °C)  Pulse:  [] 94  Resp:  [16-24] 20  SpO2:  [93 %-98 %] 96 %  BP: (105-141)/(61-76) 118/66     Weight: 54.4 kg (119 lb 14.9 oz)  Body mass index is 19.96 kg/m².    Intake/Output Summary (Last 24 hours) at 12/30/17 1406  Last data filed at 12/30/17 1010   Gross per 24 hour   Intake             1325 ml   Output                0 ml   Net             1325 ml      Physical Exam   Constitutional: He appears cachectic. He appears ill.   HENT:   Head: Normocephalic and atraumatic.   Eyes: EOM are normal. Pupils are equal, round, and reactive to light.   Neck: Neck supple.   Cardiovascular: Normal rate and regular rhythm.    Pulmonary/Chest: Effort normal. No respiratory distress.   Trach noted    Abdominal: Soft.   PEG placed   Musculoskeletal:   B/l UE & LE contracted   Neurological:   Somnolent but does open eye to voice and stimuli.  Speech: non-verbal.  Does not follow commands.       Skin: Skin is warm and dry.   Psychiatric: Cognition and memory are impaired.       Significant Labs:   BMP:   Recent Labs  Lab 12/30/17  0425   GLU 83      K 4.0      CO2 23   BUN 36*   CREATININE 1.1   CALCIUM 9.0   MG 1.9     CBC:   Recent Labs  Lab 12/29/17  0329 12/30/17  0425   WBC 9.39 9.32   HGB 9.5* 10.0*   HCT 29.2* 30.8*    221

## 2017-12-30 NOTE — ASSESSMENT & PLAN NOTE
- Hemoglobin stable at ~10, without active bleeding.  - Likely component of anemia of chronic disease.

## 2017-12-30 NOTE — PROGRESS NOTES
[facility] Hospital Medicine  Progress Note    Patient Name: Enrique Yancey  MRN: [id number]  Patient Class: IP- Inpatient   Admission Date: 12/17/2017  Length of Stay: 13 days  Attending Physician: Zenaida Chaudhary MD  Primary Care Provider: Ang Hassan MD    [place]ospital Medicine [place] [facility] George Freire MD    Subjective:     Principal Problem:Seizure    HPI:  Enrique Yancey is a 67 y.o. male with with a medical history significant for drug-induced dermatomyositis, essential hypertension, left ICA stenosis with subsequent MCA CVA with residual right spastic hemiparesis who [place] [facility] [place] [facility] on [facility]/17/2017.  He presented to outside ED with seizure activity for approximately 20 minutes. Per EMS, he was diaphoretic on arrival and tachycardic. Per nursing home, pt is nonverbal at baseline, but can focus on the speaker. He has a trach in place. He is being admitted to [facility] for a higher level of care.     Hospital Course:  12/17: Admit to [facility], EEG pending, Keppra 1 G  at OSH and 500 Q 12 started, CTH: No acute process   12/18: Increase keppra per epilepsy, sputum culture, trend procal  12/19: Treated for ESBL UTI and HAP (respiratory cultures growing Providencia/Proteus) with Zosyn for 7 days. Patient remained clinically stable during his hospital course and discharge to [facility] deferred secondary to high settings on trach collar  12/30: Stepped-down to hospital medicine 1    Interval History:   No acute events overnight. Patient did have fever reported of 100.4 at 0349 that resolved without therapy.    Review of Systems   Unable to perform ROS: Patient nonverbal     Objective:     Vital Signs (Most Recent):  Temp: 99 °F (37.2 °C) (12/30/17 1152)  Pulse: 94 (12/30/17 1152)  Resp: 20 (12/30/17 1152)  BP: 118/66 (12/30/17 1152)  SpO2: 96 % (12/30/17 1152) Vital Signs (24h Range):  Temp:  [96.5 °F (35.8 °C)-100.4 °F (38 °C)] 99 °F (37.2 °C)  Pulse:  []  94  Resp:  [16-24] 20  SpO2:  [93 %-98 %] 96 %  BP: (105-141)/(61-76) 118/66     Weight: 54.4 kg (119 lb 14.9 oz)  Body mass index is 19.96 kg/m².    Intake/Output Summary (Last 24 hours) at 12/30/17 1406  Last data filed at 12/30/17 1010   Gross per 24 hour   Intake             1325 ml   Output                0 ml   Net             1325 ml      Physical Exam   Constitutional: He appears cachectic. He appears ill.   HENT:   Head: Normocephalic and atraumatic.   Eyes: EOM are normal. Pupils are equal, round, and reactive to light.   Neck: Neck supple.   Cardiovascular: Normal rate and regular rhythm.    Pulmonary/Chest: Effort normal. No respiratory distress.   Trach noted    Abdominal: Soft.   PEG placed   Musculoskeletal:   B/l UE & LE contracted   Neurological:   Somnolent but does open eye to voice and stimuli.  Speech: non-verbal.  Does not follow commands.       Skin: Skin is warm and dry.   Psychiatric: Cognition and memory are impaired.       Significant Labs:   BMP:   Recent Labs  Lab 12/30/17  0425   GLU 83      K 4.0      CO2 23   BUN 36*   CREATININE 1.1   CALCIUM 9.0   MG 1.9     CBC:   Recent Labs  Lab 12/29/17  0329 12/30/17  0425   WBC 9.39 9.32   HGB 9.5* 10.0*   HCT 29.2* 30.8*    221     Assessment/Plan:      * Seizure    - Patient without tonic-clonic episode since admission.  - Epilepsy has been following patient.  - Continue levetiracetam 1 g by PEG BID.          Cerebrovascular accident (CVA) due to thrombosis of left carotid artery    - Prior left MCA CVA in 2016 with residual right spastic hemiparesis, purportedly s/p CEA.   - See below.         Right spastic hemiparesis    - From previous L MCA CVA.  - Continue dantrolene 25 mg per G tube TID and baclofen 5 mg per G tube TID.          Normocytic anemia    - Hemoglobin stable at ~10, without active bleeding.  - Likely component of anemia of chronic disease.           Benign prostatic hyperplasia with lower urinary tract  symptoms    - Continue finasteride 5 mg per G tuve daily and tamsulosin 0.4 mg per G tube TID.         Hypothyroidism    - Continue levothyroxine 75 mcg per G tube daily.            Pharyngoesophageal dysphagia    - s/p CVA.  - Tolerating TF at goal rate of 40           Aphasia    - s/p CVA          CKD (chronic kidney disease) stage 3, GFR 30-59 ml/min    - Avoid nephrotic agents. ELIECER previously on admission has resolved on IVF and resolution of infections.        HTN (hypertension)                VTE Risk Mitigation         Ordered     enoxaparin injection 40 mg  Every 24 hours (non-standard times)     Route:  Subcutaneous        12/17/17 1131     Medium Risk of VTE  Once      12/17/17 0449     Place sequential compression device  Until discontinued      12/17/17 0449        Disposition: Patient clinically stable. Monitor patient's hemodynamics considering previous ESBL UTI and HAP and mild fever this morning. Discharge pending NH accepting trach collar, at earliest Tuesday 1/2.    George Freire MD  PGY-2 Internal Medicine  [phone]    Department of Hospital Medicine   [facility]

## 2017-12-30 NOTE — PLAN OF CARE
Problem: Patient Care Overview  Goal: Plan of Care Review  Outcome: Ongoing (interventions implemented as appropriate)  Pt respond to voice. No Non verbal signs of distress. No seizure activity noted. Suction as needed, thick yellow-white sputum. turned Q2. Skin remained intact. Contact precautions maintained throughout. Afebrile. See flowsheet for assessment. No acute events. Will continue to monitor and reassess.

## 2017-12-30 NOTE — HPI
Enrique Yancey is a 67 y.o. male with with a medical history significant for drug-induced dermatomyositis, essential hypertension, left ICA stenosis with subsequent MCA CVA with residual right spastic hemiparesis who [place] [facility] [place] [facility] on [facility]/17/2017.  He presented to outside ED with seizure activity for approximately 20 minutes. Per EMS, he was diaphoretic on arrival and tachycardic. Per nursing home, pt is nonverbal at baseline, but can focus on the speaker. He has a trach in place. He is being admitted to [facility] for a higher level of care.

## 2017-12-30 NOTE — HOSPITAL COURSE
12/17: Admit to [facility], EEG pending, Keppra 1 G  at OSH and 500 Q 12 started, CTH: No acute process   12/18: Increase keppra per epilepsy, sputum culture, trend procal  12/19: Treated for ESBL UTI and HAP (respiratory cultures growing Providencia/Proteus) with Zosyn for 7 days. Patient remained clinically stable during his hospital course and discharge to [facility] deferred secondary to high settings on trach collar  12/30: Stepped-down to hospital medicine 1  01/03: Awaiting placement.   01/04: No acute issues over night. Awaiting placement.   1/05: NAEON, TFs running at 40cc/hr increase to 45cc./hr, pt remains severely debilitated still pending re-evaluation by facilities that can provide for him w/ PEG & Trach, will initiate GOC discussion w/ daughter while he is inpatient, plan for discussion Sunday (1/7) morning    01/06: No acute events over night.     01/07: No acute issue over night. Goals of cared discussion with daughter tomorrow 01/08/18. Still awaiting placement that will allow pt with trach and PEG tubes.     01/08 No acute events. Daughter stopped by for goals of discussion. Would like to make patient Full code, at least until he sees his granddaughter yet to be born. Has rejections from a couple of nursing homes per SW. Stays afebrile, HD stable.     01/08: Urine cx on 01/03 with pseudomonas. Repeat ucx to r/o ESBL, f/u results. Deferred not to treat pseudomonas for now, will repeat urine culture and if persistently positive, will consider treating. Most likely colonization. Still awaiting placement.     1/10: Patient with new onset fever overnight up to 101.9°F with associated hypotension and tachycardia.  Good response to fluid bolus. previous urine cultures show pseudomonas sensitive to Cipro ciprofloxacin started.  There is also evidence of tube feeds surrounding trach site concerning for aspiration, chest x-ray with some opacification of right middle lobe, patient started on 7 day course of Unasyn.  "BCx, UCx pending    01/11: Patient HD stable. Repeat UCx, no growth. Will remove contact precaution as pt has two negative cx for ESBL. Previous UCx with pseudomonas, on Cipro. On Unasyn for possible Aspiration PNA. He is otherwise stable. Plan to wean oxygen to 3L: Nursing home will not accept if oxygen requirement is above 3L.     01/12: No acute events over night. Satting in the high 90's with out oxygen requirement. Remains stable. Awaiting placement.     01/13: Patient remains HD stable. Still not requiring oxygen and sats well above the 90's. Awaiting placement.     1/14: ERVIN, pt on last day of cipro for UTI, day 5/7 of unasyn, 97% O2 on RA, pending placement     01/15: No acute events over nigh. Completed 5 day course of Cipro for UTI. On Unasyn for aspiration PNA. Remains afebrile, sating well in the 90's on RA. Per nurse pt is not on oxygen "just flow".     01/16: No acute events over nights. Still awaiting placement. Remains HD stable.     01/17: 7/7 for Unasyn today. Remains HD stable. D/C'd Dantrolene and Zanaflex concerning for drowsiness/unecessary meds. Awaiting placement.     01/18: HD stable. More alert today. Awaiting placement.     1/19: ERVIN, daughter would like to attempt Cap trial of trach to see if can be weaned off. Pt is not on any oxygen but rather on room air flow currently. Discussed with resp therapy. Will attempt Cap trial.     01/20: NAEON. Sating well in the high 90's with cap on trach. Na 128, repeat lab 133, likely lab error. Will continue to monitor. Serum osmolarity wnl. Awaiting placement.     01/21: NAEON. Patient did not void this morning per patient. Plan to bladder scan today. Na 136 today. Awaiting placement.     01/22: NAEON. Patient voiding with out trouble. Increased secretions from trach, uncapped, sating well in the 90's. Awaiting placement.     01/23: NAEON. Sating well in the 90's. Awaiting placement. Removal of trach can be problematic, though pt is sating well " with a cap on, given his debility/bad prognosis may need trach in the long run. Plan to discuss with daughter. Remains HD stable.     1/24: ERVIN, plans to DC to SNF today

## 2017-12-30 NOTE — ASSESSMENT & PLAN NOTE
- Prior left MCA CVA in 2016 with residual right spastic hemiparesis, purportedly s/p CEA.   - See below.

## 2017-12-30 NOTE — ASSESSMENT & PLAN NOTE
- Avoid nephrotic agents. ELIECER previously on admission has resolved on IVF and resolution of infections.

## 2017-12-30 NOTE — ASSESSMENT & PLAN NOTE
- From previous L MCA CVA.  - Continue dantrolene 25 mg per G tube TID and baclofen 5 mg per G tube TID.

## 2017-12-30 NOTE — ASSESSMENT & PLAN NOTE
- Patient without tonic-clonic episode since admission.  - Epilepsy has been following patient.  - Continue levetiracetam 1 g by PEG BID.

## 2017-12-31 LAB
ALBUMIN SERPL BCP-MCNC: 2.3 G/DL
ALP SERPL-CCNC: 162 U/L
ALT SERPL W/O P-5'-P-CCNC: 37 U/L
ANION GAP SERPL CALC-SCNC: 6 MMOL/L
AST SERPL-CCNC: 34 U/L
BASOPHILS # BLD AUTO: 0.04 K/UL
BASOPHILS NFR BLD: 0.5 %
BILIRUB SERPL-MCNC: 0.3 MG/DL
BUN SERPL-MCNC: 40 MG/DL
CALCIUM SERPL-MCNC: 8.7 MG/DL
CHLORIDE SERPL-SCNC: 113 MMOL/L
CO2 SERPL-SCNC: 22 MMOL/L
CREAT SERPL-MCNC: 1.1 MG/DL
DIFFERENTIAL METHOD: ABNORMAL
EOSINOPHIL # BLD AUTO: 0.3 K/UL
EOSINOPHIL NFR BLD: 3.7 %
ERYTHROCYTE [DISTWIDTH] IN BLOOD BY AUTOMATED COUNT: 16.5 %
EST. GFR  (AFRICAN AMERICAN): >60 ML/MIN/1.73 M^2
EST. GFR  (NON AFRICAN AMERICAN): >60 ML/MIN/1.73 M^2
GLUCOSE SERPL-MCNC: 106 MG/DL
HCT VFR BLD AUTO: 29.6 %
HGB BLD-MCNC: 9.7 G/DL
IMM GRANULOCYTES # BLD AUTO: 0.03 K/UL
IMM GRANULOCYTES NFR BLD AUTO: 0.4 %
LYMPHOCYTES # BLD AUTO: 0.8 K/UL
LYMPHOCYTES NFR BLD: 10.5 %
MAGNESIUM SERPL-MCNC: 1.8 MG/DL
MCH RBC QN AUTO: 30.9 PG
MCHC RBC AUTO-ENTMCNC: 32.8 G/DL
MCV RBC AUTO: 94 FL
MONOCYTES # BLD AUTO: 1 K/UL
MONOCYTES NFR BLD: 13 %
NEUTROPHILS # BLD AUTO: 5.7 K/UL
NEUTROPHILS NFR BLD: 71.9 %
NRBC BLD-RTO: 0 /100 WBC
PHOSPHATE SERPL-MCNC: 2.9 MG/DL
PLATELET # BLD AUTO: 224 K/UL
PMV BLD AUTO: 11.6 FL
POCT GLUCOSE: 107 MG/DL (ref 70–110)
POCT GLUCOSE: 110 MG/DL (ref 70–110)
POCT GLUCOSE: 112 MG/DL (ref 70–110)
POCT GLUCOSE: 124 MG/DL (ref 70–110)
POCT GLUCOSE: 92 MG/DL (ref 70–110)
POTASSIUM SERPL-SCNC: 4 MMOL/L
PROT SERPL-MCNC: 7.6 G/DL
RBC # BLD AUTO: 3.14 M/UL
SODIUM SERPL-SCNC: 141 MMOL/L
WBC # BLD AUTO: 7.94 K/UL

## 2017-12-31 PROCEDURE — 85025 COMPLETE CBC W/AUTO DIFF WBC: CPT

## 2017-12-31 PROCEDURE — 83735 ASSAY OF MAGNESIUM: CPT

## 2017-12-31 PROCEDURE — 84100 ASSAY OF PHOSPHORUS: CPT

## 2017-12-31 PROCEDURE — 99231 SBSQ HOSP IP/OBS SF/LOW 25: CPT | Mod: GC,,, | Performed by: HOSPITALIST

## 2017-12-31 PROCEDURE — 25000003 PHARM REV CODE 250: Performed by: NURSE PRACTITIONER

## 2017-12-31 PROCEDURE — 80053 COMPREHEN METABOLIC PANEL: CPT

## 2017-12-31 PROCEDURE — 63600175 PHARM REV CODE 636 W HCPCS: Performed by: PHYSICIAN ASSISTANT

## 2017-12-31 PROCEDURE — 94761 N-INVAS EAR/PLS OXIMETRY MLT: CPT

## 2017-12-31 PROCEDURE — 11000001 HC ACUTE MED/SURG PRIVATE ROOM

## 2017-12-31 PROCEDURE — 36415 COLL VENOUS BLD VENIPUNCTURE: CPT

## 2017-12-31 PROCEDURE — 27000221 HC OXYGEN, UP TO 24 HOURS

## 2017-12-31 PROCEDURE — 25000003 PHARM REV CODE 250: Performed by: PSYCHIATRY & NEUROLOGY

## 2017-12-31 PROCEDURE — 25000003 PHARM REV CODE 250: Performed by: PHYSICIAN ASSISTANT

## 2017-12-31 PROCEDURE — 99900026 HC AIRWAY MAINTENANCE (STAT)

## 2017-12-31 PROCEDURE — 25000003 PHARM REV CODE 250: Performed by: STUDENT IN AN ORGANIZED HEALTH CARE EDUCATION/TRAINING PROGRAM

## 2017-12-31 RX ORDER — AMLODIPINE BESYLATE 10 MG/1
10 TABLET ORAL DAILY
Status: ON HOLD | COMMUNITY
End: 2018-01-24 | Stop reason: HOSPADM

## 2017-12-31 RX ORDER — MULTIVITAMIN
1 TABLET ORAL DAILY
COMMUNITY

## 2017-12-31 RX ORDER — BACLOFEN 20 MG/1
20 TABLET ORAL 3 TIMES DAILY
Status: ON HOLD | COMMUNITY
End: 2018-01-24 | Stop reason: HOSPADM

## 2017-12-31 RX ADMIN — DONEPEZIL HYDROCHLORIDE 5 MG: 5 TABLET, FILM COATED ORAL at 09:12

## 2017-12-31 RX ADMIN — ENOXAPARIN SODIUM 40 MG: 100 INJECTION SUBCUTANEOUS at 05:12

## 2017-12-31 RX ADMIN — DANTROLENE SODIUM 25 MG: 25 CAPSULE ORAL at 03:12

## 2017-12-31 RX ADMIN — BETHANECHOL CHLORIDE 10 MG: 10 TABLET ORAL at 09:12

## 2017-12-31 RX ADMIN — LEVETIRACETAM 1000 MG: 100 SOLUTION ORAL at 08:12

## 2017-12-31 RX ADMIN — DANTROLENE SODIUM 25 MG: 25 CAPSULE ORAL at 05:12

## 2017-12-31 RX ADMIN — FLUOXETINE 20 MG: 20 CAPSULE ORAL at 09:12

## 2017-12-31 RX ADMIN — SODIUM CHLORIDE 500 ML: 900 INJECTION, SOLUTION INTRAVENOUS at 09:12

## 2017-12-31 RX ADMIN — BACLOFEN 5 MG: 10 TABLET ORAL at 05:12

## 2017-12-31 RX ADMIN — FAMOTIDINE 20 MG: 20 TABLET, FILM COATED ORAL at 08:12

## 2017-12-31 RX ADMIN — BACLOFEN 5 MG: 10 TABLET ORAL at 03:12

## 2017-12-31 RX ADMIN — BETHANECHOL CHLORIDE 10 MG: 10 TABLET ORAL at 05:12

## 2017-12-31 RX ADMIN — FAMOTIDINE 20 MG: 20 TABLET, FILM COATED ORAL at 09:12

## 2017-12-31 RX ADMIN — LEVETIRACETAM 1000 MG: 100 SOLUTION ORAL at 09:12

## 2017-12-31 RX ADMIN — MUPIROCIN: 20 OINTMENT TOPICAL at 09:12

## 2017-12-31 RX ADMIN — ATORVASTATIN CALCIUM 40 MG: 20 TABLET, FILM COATED ORAL at 09:12

## 2017-12-31 RX ADMIN — BACLOFEN 5 MG: 10 TABLET ORAL at 09:12

## 2017-12-31 RX ADMIN — LEVOTHYROXINE SODIUM 75 MCG: 75 TABLET ORAL at 09:12

## 2017-12-31 RX ADMIN — VITAMIN C 1 TABLET: TAB at 09:12

## 2017-12-31 RX ADMIN — MINERAL OIL AND WHITE PETROLATUM: 150; 830 OINTMENT OPHTHALMIC at 09:12

## 2017-12-31 RX ADMIN — FINASTERIDE 5 MG: 5 TABLET, FILM COATED ORAL at 09:12

## 2017-12-31 RX ADMIN — BETHANECHOL CHLORIDE 10 MG: 10 TABLET ORAL at 10:12

## 2017-12-31 RX ADMIN — TAMSULOSIN HYDROCHLORIDE 0.4 MG: 0.4 CAPSULE ORAL at 08:12

## 2017-12-31 RX ADMIN — TAMSULOSIN HYDROCHLORIDE 0.4 MG: 0.4 CAPSULE ORAL at 09:12

## 2017-12-31 RX ADMIN — BETHANECHOL CHLORIDE 10 MG: 10 TABLET ORAL at 03:12

## 2017-12-31 RX ADMIN — ASPIRIN 81 MG CHEWABLE TABLET 81 MG: 81 TABLET CHEWABLE at 09:12

## 2017-12-31 RX ADMIN — DANTROLENE SODIUM 25 MG: 25 CAPSULE ORAL at 08:12

## 2017-12-31 NOTE — ASSESSMENT & PLAN NOTE
- Patient without tonic-clonic episode since admission.  - Epilepsy has been following patient.  - Likely instigated by dehydration and infections (ESBL UTI and HAP - patient has completed therapy).   - Continue levetiracetam 1 g by PEG BID.

## 2017-12-31 NOTE — SUBJECTIVE & OBJECTIVE
Interval History:   No acute events overnight. Patient has been afebrile and without increasing supplemental O2 requirements (at 5L trach collar).     Review of Systems   Unable to perform ROS: Patient unresponsive     Objective:     Vital Signs (Most Recent):  Temp: 98.6 °F (37 °C) (12/31/17 1059)  Pulse: 94 (12/31/17 1059)  Resp: 19 (12/31/17 1059)  BP: 121/79 (12/31/17 1059)  SpO2: 100 % (12/31/17 1059) Vital Signs (24h Range):  Temp:  [98.4 °F (36.9 °C)-99.6 °F (37.6 °C)] 98.6 °F (37 °C)  Pulse:  [] 94  Resp:  [16-20] 19  SpO2:  [94 %-100 %] 100 %  BP: ()/(59-82) 121/79     Weight: 54.4 kg (119 lb 14.9 oz)  Body mass index is 19.96 kg/m².    Intake/Output Summary (Last 24 hours) at 12/31/17 1301  Last data filed at 12/31/17 0915   Gross per 24 hour   Intake              320 ml   Output                0 ml   Net              320 ml      Physical Exam   Constitutional: He appears cachectic. He appears ill.   HENT:   Head: Normocephalic and atraumatic.   Eyes: EOM are normal. Pupils are equal, round, and reactive to light.   Neck: Neck supple.   Cardiovascular: Normal rate and regular rhythm.    Pulmonary/Chest: Effort normal. No respiratory distress.   Trach noted    Abdominal: Soft.   PEG placed   Musculoskeletal:   B/l UE & LE contracted   Neurological:   Somnolent but does open eye to voice and stimuli.  Speech: non-verbal.  Does not follow commands.       Skin: Skin is warm and dry.   Psychiatric: Cognition and memory are impaired.       Significant Labs:   BMP:   Recent Labs  Lab 12/31/17  0537         K 4.0   *   CO2 22*   BUN 40*   CREATININE 1.1   CALCIUM 8.7   MG 1.8     CBC:   Recent Labs  Lab 12/30/17  0425 12/31/17  0537   WBC 9.32 7.94   HGB 10.0* 9.7*   HCT 30.8* 29.6*    224

## 2017-12-31 NOTE — PLAN OF CARE
Problem: Patient Care Overview  Goal: Plan of Care Review  Outcome: Ongoing (interventions implemented as appropriate)  Safety:  call light in reach, patient oriented to room & instructed how to notify nurse if assistance is needed, non verbal, bed in lowest position with wheels locked & side rails up X 2, fall precautions followed, patient free from fall & injury thus far this shift;  VTE/bleeding precautions maintained.  Activity: strict bed rest, weight shifted at least every other hour.  Neurological:  Alert, rouses easily, unable to fully assess neuro status due to verbal aphasia..  Respiratory:  Patient on trach collar, requires suction, SPO denies SOB.  Cardiac:  Denies chest pain, BP stable.  HR stable.  NSR on telemetry.  Afebrile this shift.  GI:  Patient tolerates PO intake well, den acceptable. No evidence of nausea, LBM 12/31/17.  :  patient voids clear yellow urine without foul odor spontaneously & without difficulty, adequate output for shift.  Skin:  CDI.  Devices:  PIV CDI, negative for s/sx of infection & infiltration.  Pain:  patient denies pain.  Will continue to monitor patient.

## 2017-12-31 NOTE — PT/OT/SLP PROGRESS
Occupational Therapy      Patient Name:  Enrique Yancey   MRN:  [id number]    OT evaluation orders acknowledge. Communicated with Physical therapist (Marianna Castro) regarding patients prior and current level of function. Pt not appropriate for acute OT services at this time.   Melania campbell, OT  12/31/2017

## 2017-12-31 NOTE — PLAN OF CARE
Problem: Patient Care Overview  Goal: Plan of Care Review  Outcome: Ongoing (interventions implemented as appropriate)  Discussed plan of care with the patient's daughter, including medications given, tracheostomy care.  Patient remained stable throughout the shift.    Problem: Seizure Disorder/Epilepsy (Adult)  Goal: Signs and Symptoms of Listed Potential Problems Will be Absent, Minimized or Managed (Seizure Disorder/Epilepsy)  Signs and symptoms of listed potential problems will be absent, minimized or managed by discharge/transition of care (reference Seizure Disorder/Epilepsy (Adult) CPG).   Outcome: Ongoing (interventions implemented as appropriate)  Patient administered anticonvulsants as prescribed, no seizure activity.

## 2017-12-31 NOTE — PROGRESS NOTES
[facility] Hospital Medicine  Progress Note    Patient Name: Enrique Yancey  MRN: [id number]  Patient Class: IP- Inpatient   Admission Date: 12/17/2017  Length of Stay: 14 days  Attending Physician: Zenaida Chaudhary MD  Primary Care Provider: Ang Hassan MD    [place]ospital Medicine [place] [facility] George Freire MD    Subjective:     Principal Problem:Seizure    HPI:  Enrique Yancey is a 67 y.o. male with with a medical history significant for drug-induced dermatomyositis, essential hypertension, left ICA stenosis with subsequent MCA CVA with residual right spastic hemiparesis who [place] [facility] [place] from [facility] on 12/17/2017.  He presented to outside ED with seizure activity for approximately 20 minutes. Per EMS, he was diaphoretic on arrival and tachycardic. Per nursing home, pt is nonverbal at baseline, but can focus on the speaker. He has a trach in place. He is being admitted to [facility] for a higher level of care.     Hospital Course:  12/17: Admit to [facility], EEG pending, Keppra 1 G  at OSH and 500 Q 12 started, CTH: No acute process   12/18: Increase keppra per epilepsy, sputum culture, trend procal  12/19: Treated for ESBL UTI and HAP (respiratory cultures growing Providencia/Proteus) with Zosyn for 7 days. Patient remained clinically stable during his hospital course and discharge to [facility] deferred secondary to high settings on trach collar  12/30: Stepped-down to hospital medicine 1    Interval History:   No acute events overnight. Patient has been afebrile and without increasing supplemental O2 requirements (at 5L trach collar).     Review of Systems   Unable to perform ROS: Patient unresponsive     Objective:     Vital Signs (Most Recent):  Temp: 98.6 °F (37 °C) (12/31/17 1059)  Pulse: 94 (12/31/17 1059)  Resp: 19 (12/31/17 1059)  BP: 121/79 (12/31/17 1059)  SpO2: 100 % (12/31/17 1059) Vital Signs (24h Range):  Temp:  [98.4 °F (36.9 °C)-99.6 °F (37.6 °C)] 98.6 °F  (37 °C)  Pulse:  [] 94  Resp:  [16-20] 19  SpO2:  [94 %-100 %] 100 %  BP: ()/(59-82) 121/79     Weight: 54.4 kg (119 lb 14.9 oz)  Body mass index is 19.96 kg/m².    Intake/Output Summary (Last 24 hours) at 12/31/17 1301  Last data filed at 12/31/17 0915   Gross per 24 hour   Intake              320 ml   Output                0 ml   Net              320 ml      Physical Exam   Constitutional: He appears cachectic. He appears ill.   HENT:   Head: Normocephalic and atraumatic.   Eyes: EOM are normal. Pupils are equal, round, and reactive to light.   Neck: Neck supple.   Cardiovascular: Normal rate and regular rhythm.    Pulmonary/Chest: Effort normal. No respiratory distress.   Trach noted    Abdominal: Soft.   PEG placed   Musculoskeletal:   B/l UE & LE contracted   Neurological:   Somnolent but does open eye to voice and stimuli.  Speech: non-verbal.  Does not follow commands.       Skin: Skin is warm and dry.   Psychiatric: Cognition and memory are impaired.       Significant Labs:   BMP:   Recent Labs  Lab 12/31/17  0537         K 4.0   *   CO2 22*   BUN 40*   CREATININE 1.1   CALCIUM 8.7   MG 1.8     CBC:   Recent Labs  Lab 12/30/17  0425 12/31/17  0537   WBC 9.32 7.94   HGB 10.0* 9.7*   HCT 30.8* 29.6*    224         Assessment/Plan:      * Seizure    - Patient without tonic-clonic episode since admission.  - Epilepsy has been following patient.  - Likely instigated by dehydration and infections (ESBL UTI and HAP - patient has completed therapy).   - Continue levetiracetam 1 g by PEG BID.          Cerebrovascular accident (CVA) due to thrombosis of left carotid artery    - Prior left MCA CVA in 2016 with residual right spastic hemiparesis, purportedly s/p CEA.   - See below.         Right spastic hemiparesis    - From previous L MCA CVA.  - Continue dantrolene 25 mg per G tube TID and baclofen 5 mg per G tube TID.          Normocytic anemia    - Hemoglobin stable at ~10,  without active bleeding.  - Likely component of anemia of chronic disease.           Benign prostatic hyperplasia with lower urinary tract symptoms    - Continue finasteride 5 mg per G tuve daily and tamsulosin 0.4 mg per G tube TID.         Hypothyroidism    - Continue levothyroxine 75 mcg per G tube daily.            Pharyngoesophageal dysphagia    - s/p CVA.  - Tolerating TF at goal rate of 40           Aphasia    - s/p CVA          CKD (chronic kidney disease) stage 3, GFR 30-59 ml/min    - Avoid nephrotic agents. ELIECER previously on admission has resolved on IVF and resolution of infections.        HTN (hypertension)                VTE Risk Mitigation         Ordered     enoxaparin injection 40 mg  Every 24 hours (non-standard times)     Route:  Subcutaneous        12/17/17 1131     Medium Risk of VTE  Once      12/17/17 0449     Place sequential compression device  Until discontinued      12/17/17 0449              George Freire MD  PGY-2 Internal Medicine  [phone]    Department of Hospital Medicine   [facility]

## 2018-01-01 LAB
POCT GLUCOSE: 107 MG/DL (ref 70–110)
POCT GLUCOSE: 125 MG/DL (ref 70–110)
POCT GLUCOSE: 83 MG/DL (ref 70–110)
POCT GLUCOSE: 87 MG/DL (ref 70–110)

## 2018-01-01 PROCEDURE — 27000221 HC OXYGEN, UP TO 24 HOURS

## 2018-01-01 PROCEDURE — 25000003 PHARM REV CODE 250: Performed by: PHYSICIAN ASSISTANT

## 2018-01-01 PROCEDURE — 94761 N-INVAS EAR/PLS OXIMETRY MLT: CPT

## 2018-01-01 PROCEDURE — 99231 SBSQ HOSP IP/OBS SF/LOW 25: CPT | Mod: GC,,, | Performed by: HOSPITALIST

## 2018-01-01 PROCEDURE — 99900026 HC AIRWAY MAINTENANCE (STAT)

## 2018-01-01 PROCEDURE — 25000003 PHARM REV CODE 250: Performed by: PSYCHIATRY & NEUROLOGY

## 2018-01-01 PROCEDURE — 25000003 PHARM REV CODE 250: Performed by: NURSE PRACTITIONER

## 2018-01-01 PROCEDURE — 11000001 HC ACUTE MED/SURG PRIVATE ROOM

## 2018-01-01 PROCEDURE — 63600175 PHARM REV CODE 636 W HCPCS: Performed by: PHYSICIAN ASSISTANT

## 2018-01-01 RX ADMIN — BACLOFEN 5 MG: 10 TABLET ORAL at 05:01

## 2018-01-01 RX ADMIN — MUPIROCIN: 20 OINTMENT TOPICAL at 09:01

## 2018-01-01 RX ADMIN — BETHANECHOL CHLORIDE 10 MG: 10 TABLET ORAL at 05:01

## 2018-01-01 RX ADMIN — FLUOXETINE 20 MG: 20 CAPSULE ORAL at 09:01

## 2018-01-01 RX ADMIN — TAMSULOSIN HYDROCHLORIDE 0.4 MG: 0.4 CAPSULE ORAL at 09:01

## 2018-01-01 RX ADMIN — BACLOFEN 5 MG: 10 TABLET ORAL at 09:01

## 2018-01-01 RX ADMIN — LEVOTHYROXINE SODIUM 75 MCG: 75 TABLET ORAL at 08:01

## 2018-01-01 RX ADMIN — ATORVASTATIN CALCIUM 40 MG: 20 TABLET, FILM COATED ORAL at 09:01

## 2018-01-01 RX ADMIN — POLYETHYLENE GLYCOL 3350 17 G: 17 POWDER, FOR SOLUTION ORAL at 09:01

## 2018-01-01 RX ADMIN — DANTROLENE SODIUM 25 MG: 25 CAPSULE ORAL at 01:01

## 2018-01-01 RX ADMIN — BETHANECHOL CHLORIDE 10 MG: 10 TABLET ORAL at 09:01

## 2018-01-01 RX ADMIN — FINASTERIDE 5 MG: 5 TABLET, FILM COATED ORAL at 08:01

## 2018-01-01 RX ADMIN — FAMOTIDINE 20 MG: 20 TABLET, FILM COATED ORAL at 08:01

## 2018-01-01 RX ADMIN — TAMSULOSIN HYDROCHLORIDE 0.4 MG: 0.4 CAPSULE ORAL at 08:01

## 2018-01-01 RX ADMIN — BACLOFEN 5 MG: 10 TABLET ORAL at 01:01

## 2018-01-01 RX ADMIN — BETHANECHOL CHLORIDE 10 MG: 10 TABLET ORAL at 01:01

## 2018-01-01 RX ADMIN — DANTROLENE SODIUM 25 MG: 25 CAPSULE ORAL at 09:01

## 2018-01-01 RX ADMIN — STANDARDIZED SENNA CONCENTRATE AND DOCUSATE SODIUM 1 TABLET: 8.6; 5 TABLET, FILM COATED ORAL at 08:01

## 2018-01-01 RX ADMIN — ASPIRIN 81 MG CHEWABLE TABLET 81 MG: 81 TABLET CHEWABLE at 09:01

## 2018-01-01 RX ADMIN — LEVETIRACETAM 1000 MG: 100 SOLUTION ORAL at 08:01

## 2018-01-01 RX ADMIN — MINERAL OIL AND WHITE PETROLATUM: 150; 830 OINTMENT OPHTHALMIC at 09:01

## 2018-01-01 RX ADMIN — DONEPEZIL HYDROCHLORIDE 5 MG: 5 TABLET, FILM COATED ORAL at 09:01

## 2018-01-01 RX ADMIN — DANTROLENE SODIUM 25 MG: 25 CAPSULE ORAL at 05:01

## 2018-01-01 RX ADMIN — MUPIROCIN: 20 OINTMENT TOPICAL at 08:01

## 2018-01-01 RX ADMIN — ENOXAPARIN SODIUM 40 MG: 100 INJECTION SUBCUTANEOUS at 05:01

## 2018-01-01 RX ADMIN — VITAMIN C 1 TABLET: TAB at 08:01

## 2018-01-01 NOTE — PLAN OF CARE
Problem: Patient Care Overview  Goal: Plan of Care Review  Outcome: Ongoing (interventions implemented as appropriate)  Uneventful shift. No falls or skin tears. Vitals stable, Sats maintained on ordered O2 therapy per trach collar. Skin needs addressed w frequent diaper/ pad changes as well as turning Q2 hrs. Trach suctioned PRN to maintain airway patency. Nutritional needs maintained w continuous tube feeding well tolerated at goal weight. Last BM 12/31/17. Patient w expressive aphasia, not able to accurately assess what he comprehends but POC reviewed w him. No family w him this lisa.

## 2018-01-01 NOTE — PROGRESS NOTES
[facility] Hospital Medicine  Progress Note    Patient Name: Enrique Yancey  MRN: [id number]  Patient Class: IP- Inpatient   Admission Date: 12/17/2017  Length of Stay: 15 days  Attending Physician: Zenaida Chaudhary MD  Primary Care Provider: Ang Hassan MD    Hospital Medicine Team: [facility]OS[facility] MED 1 George Freire MD    Subjective:     Principal Problem:Seizure    HPI:  Enrique Yancey is a 67 y.o. male with with a medical history significant for drug-induced dermatomyositis, essential hypertension, left ICA stenosis with subsequent MCA CVA with residual right spastic hemiparesis who presented to [facility] with new onset seizure from [facility] on 12/17/2017.  He presented to outside ED with seizure activity for approximately 20 minutes. Per EMS, he was diaphoretic on arrival and tachycardic. Per nursing home, pt is nonverbal at baseline, but can focus on the speaker. He has a trach in place. He is being admitted to [facility] for a higher level of care.     Hospital Course:  12/17: Admit to [facility], EEG pending, Keppra 1 G  at OSH and 500 Q 12 started, CTH: No acute process   12/18: Increase keppra per epilepsy, sputum culture, trend procal  12/19: Treated for ESBL UTI and HAP (respiratory cultures growing Providencia/Proteus) with Zosyn for 7 days. Patient remained clinically stable during his hospital course and discharge to [facility] deferred secondary to high settings on trach collar  12/30: Stepped-down to hospital medicine 1    Interval History:   No acute events overnight. Patient opened eyes to verbal stimuli this morning. Without reported fevers overnight.    Review of Systems   Unable to perform ROS: Patient unresponsive     Objective:     Vital Signs (Most Recent):  Temp: 99.5 °F (37.5 °C) (01/01/18 1324)  Pulse: 90 (01/01/18 1324)  Resp: 18 (01/01/18 1324)  BP: 115/64 (01/01/18 1324)  SpO2: 96 % (01/01/18 1211) Vital Signs (24h Range):  Temp:  [98.2 °F (36.8 °C)-99.6 °F (37.6 °C)] 99.5 °F (37.5  °C)  Pulse:  [] 90  Resp:  [16-20] 18  SpO2:  [93 %-97 %] 96 %  BP: (109-121)/(64-76) 115/64     Weight: 54.4 kg (119 lb 14.9 oz)  Body mass index is 19.96 kg/m².    Intake/Output Summary (Last 24 hours) at 01/01/18 1523  Last data filed at 01/01/18 1200   Gross per 24 hour   Intake              480 ml   Output                0 ml   Net              480 ml      Physical Exam   Constitutional: He appears cachectic. He appears ill.   HENT:   Head: Normocephalic and atraumatic.   Eyes: EOM are normal. Pupils are equal, round, and reactive to light.   Neck: Neck supple.   Cardiovascular: Normal rate and regular rhythm.    Pulmonary/Chest: Effort normal. No respiratory distress.   Trach noted    Abdominal: Soft.   PEG placed   Musculoskeletal:   B/l UE & LE contracted   Neurological:   Somnolent but does open eye to voice and stimuli.  Speech: non-verbal.  Does not follow commands.       Skin: Skin is warm and dry.   Psychiatric: Cognition and memory are impaired.       Assessment/Plan:      * Seizure    - Patient without tonic-clonic episode since admission.  - Epilepsy has been following patient.  - Likely instigated by dehydration and infections (ESBL UTI and HAP - patient has completed therapy).   - Continue levetiracetam 1 g by PEG BID.          Cerebrovascular accident (CVA) due to thrombosis of left carotid artery    - Prior left MCA CVA in 2016 with residual right spastic hemiparesis, purportedly s/p CEA.   - See below.         Right spastic hemiparesis    - From previous L MCA CVA.  - Continue dantrolene 25 mg per G tube TID and baclofen 5 mg per G tube TID.          Normocytic anemia    - Hemoglobin stable at ~10, without active bleeding.  - Likely component of anemia of chronic disease.           Benign prostatic hyperplasia with lower urinary tract symptoms    - Continue finasteride 5 mg per G tuve daily and tamsulosin 0.4 mg per G tube TID.         Hypothyroidism    - Continue levothyroxine 75 mcg per  G tube daily.            Pharyngoesophageal dysphagia    - s/p CVA.  - Tolerating TF at goal rate of 40           Aphasia    - s/p CVA          CKD (chronic kidney disease) stage 3, GFR 30-59 ml/min    - Avoid nephrotic agents. ELIECER previously on admission has resolved on IVF and resolution of infections.        HTN (hypertension)                VTE Risk Mitigation         Ordered     enoxaparin injection 40 mg  Every 24 hours (non-standard times)     Route:  Subcutaneous        12/17/17 1131     Medium Risk of VTE  Once      12/17/17 0449     Place sequential compression device  Until discontinued      12/17/17 0449        Disposition: Awaiting placement to NH accepting trach collar. Patient is hemodynamically and clinically stable.     George Freire MD  PGY-2 Internal Medicine  [phone]    Department of Hospital Medicine   [facility]

## 2018-01-01 NOTE — SUBJECTIVE & OBJECTIVE
Interval History:   No acute events overnight. Patient opened eyes to verbal stimuli this morning. Without reported fevers overnight.    Review of Systems   Unable to perform ROS: Patient unresponsive     Objective:     Vital Signs (Most Recent):  Temp: 99.5 °F (37.5 °C) (01/01/18 1324)  Pulse: 90 (01/01/18 1324)  Resp: 18 (01/01/18 1324)  BP: 115/64 (01/01/18 1324)  SpO2: 96 % (01/01/18 1211) Vital Signs (24h Range):  Temp:  [98.2 °F (36.8 °C)-99.6 °F (37.6 °C)] 99.5 °F (37.5 °C)  Pulse:  [] 90  Resp:  [16-20] 18  SpO2:  [93 %-97 %] 96 %  BP: (109-121)/(64-76) 115/64     Weight: 54.4 kg (119 lb 14.9 oz)  Body mass index is 19.96 kg/m².    Intake/Output Summary (Last 24 hours) at 01/01/18 1523  Last data filed at 01/01/18 1200   Gross per 24 hour   Intake              480 ml   Output                0 ml   Net              480 ml      Physical Exam   Constitutional: He appears cachectic. He appears ill.   HENT:   Head: Normocephalic and atraumatic.   Eyes: EOM are normal. Pupils are equal, round, and reactive to light.   Neck: Neck supple.   Cardiovascular: Normal rate and regular rhythm.    Pulmonary/Chest: Effort normal. No respiratory distress.   Trach noted    Abdominal: Soft.   PEG placed   Musculoskeletal:   B/l UE & LE contracted   Neurological:   Somnolent but does open eye to voice and stimuli.  Speech: non-verbal.  Does not follow commands.       Skin: Skin is warm and dry.   Psychiatric: Cognition and memory are impaired.

## 2018-01-01 NOTE — PLAN OF CARE
Problem: Patient Care Overview  Goal: Plan of Care Review  Outcome: Ongoing (interventions implemented as appropriate)  Explained care to the patient prior to performing care, patient given medications as ordered.  Blood sugar monitored as ordered.  Isosource going continuously at 40ml/hr.

## 2018-01-01 NOTE — PLAN OF CARE
Problem: Patient Care Overview  Goal: Plan of Care Review  Outcome: Ongoing (interventions implemented as appropriate)  Vital signs stable. Patient on trach collar maintaining sats. Trach suctioned PRN. Blood glucose monitored. Tube feedings continued. No falls throughout shift.

## 2018-01-02 LAB
ALBUMIN SERPL BCP-MCNC: 2.4 G/DL
ALP SERPL-CCNC: 166 U/L
ALT SERPL W/O P-5'-P-CCNC: 49 U/L
ANION GAP SERPL CALC-SCNC: 7 MMOL/L
AST SERPL-CCNC: 46 U/L
BASOPHILS # BLD AUTO: 0.06 K/UL
BASOPHILS NFR BLD: 1 %
BILIRUB SERPL-MCNC: 0.3 MG/DL
BUN SERPL-MCNC: 35 MG/DL
CALCIUM SERPL-MCNC: 8.9 MG/DL
CHLORIDE SERPL-SCNC: 114 MMOL/L
CO2 SERPL-SCNC: 22 MMOL/L
CREAT SERPL-MCNC: 1.1 MG/DL
DIFFERENTIAL METHOD: ABNORMAL
EOSINOPHIL # BLD AUTO: 0.3 K/UL
EOSINOPHIL NFR BLD: 4.8 %
ERYTHROCYTE [DISTWIDTH] IN BLOOD BY AUTOMATED COUNT: 16.7 %
EST. GFR  (AFRICAN AMERICAN): >60 ML/MIN/1.73 M^2
EST. GFR  (NON AFRICAN AMERICAN): >60 ML/MIN/1.73 M^2
GLUCOSE SERPL-MCNC: 112 MG/DL
HCT VFR BLD AUTO: 32.5 %
HGB BLD-MCNC: 10.1 G/DL
IMM GRANULOCYTES # BLD AUTO: 0.02 K/UL
IMM GRANULOCYTES NFR BLD AUTO: 0.3 %
LYMPHOCYTES # BLD AUTO: 0.9 K/UL
LYMPHOCYTES NFR BLD: 14 %
MAGNESIUM SERPL-MCNC: 1.9 MG/DL
MCH RBC QN AUTO: 30.3 PG
MCHC RBC AUTO-ENTMCNC: 31.1 G/DL
MCV RBC AUTO: 98 FL
MONOCYTES # BLD AUTO: 0.8 K/UL
MONOCYTES NFR BLD: 12.1 %
NEUTROPHILS # BLD AUTO: 4.2 K/UL
NEUTROPHILS NFR BLD: 67.8 %
NRBC BLD-RTO: 0 /100 WBC
PHOSPHATE SERPL-MCNC: 3 MG/DL
PLATELET # BLD AUTO: 207 K/UL
PMV BLD AUTO: 11.9 FL
POCT GLUCOSE: 110 MG/DL (ref 70–110)
POCT GLUCOSE: 114 MG/DL (ref 70–110)
POCT GLUCOSE: 122 MG/DL (ref 70–110)
POCT GLUCOSE: 132 MG/DL (ref 70–110)
POCT GLUCOSE: 132 MG/DL (ref 70–110)
POTASSIUM SERPL-SCNC: 4.2 MMOL/L
PROT SERPL-MCNC: 7.8 G/DL
RBC # BLD AUTO: 3.33 M/UL
SODIUM SERPL-SCNC: 143 MMOL/L
WBC # BLD AUTO: 6.21 K/UL

## 2018-01-02 PROCEDURE — 83735 ASSAY OF MAGNESIUM: CPT

## 2018-01-02 PROCEDURE — 99233 SBSQ HOSP IP/OBS HIGH 50: CPT | Mod: ,,, | Performed by: HOSPITALIST

## 2018-01-02 PROCEDURE — 36415 COLL VENOUS BLD VENIPUNCTURE: CPT

## 2018-01-02 PROCEDURE — 25000003 PHARM REV CODE 250: Performed by: PHYSICIAN ASSISTANT

## 2018-01-02 PROCEDURE — 11000001 HC ACUTE MED/SURG PRIVATE ROOM

## 2018-01-02 PROCEDURE — 80053 COMPREHEN METABOLIC PANEL: CPT

## 2018-01-02 PROCEDURE — 99900026 HC AIRWAY MAINTENANCE (STAT)

## 2018-01-02 PROCEDURE — 63600175 PHARM REV CODE 636 W HCPCS: Performed by: PHYSICIAN ASSISTANT

## 2018-01-02 PROCEDURE — 25000003 PHARM REV CODE 250: Performed by: PSYCHIATRY & NEUROLOGY

## 2018-01-02 PROCEDURE — 94761 N-INVAS EAR/PLS OXIMETRY MLT: CPT

## 2018-01-02 PROCEDURE — 25000003 PHARM REV CODE 250: Performed by: NURSE PRACTITIONER

## 2018-01-02 PROCEDURE — 85025 COMPLETE CBC W/AUTO DIFF WBC: CPT

## 2018-01-02 PROCEDURE — 84100 ASSAY OF PHOSPHORUS: CPT

## 2018-01-02 PROCEDURE — 27000221 HC OXYGEN, UP TO 24 HOURS

## 2018-01-02 RX ADMIN — FLUOXETINE 20 MG: 20 CAPSULE ORAL at 08:01

## 2018-01-02 RX ADMIN — MINERAL OIL AND WHITE PETROLATUM: 150; 830 OINTMENT OPHTHALMIC at 10:01

## 2018-01-02 RX ADMIN — BACLOFEN 5 MG: 10 TABLET ORAL at 10:01

## 2018-01-02 RX ADMIN — MUPIROCIN: 20 OINTMENT TOPICAL at 09:01

## 2018-01-02 RX ADMIN — LEVOTHYROXINE SODIUM 75 MCG: 75 TABLET ORAL at 08:01

## 2018-01-02 RX ADMIN — MUPIROCIN: 20 OINTMENT TOPICAL at 10:01

## 2018-01-02 RX ADMIN — DANTROLENE SODIUM 25 MG: 25 CAPSULE ORAL at 02:01

## 2018-01-02 RX ADMIN — DANTROLENE SODIUM 25 MG: 25 CAPSULE ORAL at 05:01

## 2018-01-02 RX ADMIN — ENOXAPARIN SODIUM 40 MG: 100 INJECTION SUBCUTANEOUS at 04:01

## 2018-01-02 RX ADMIN — BETHANECHOL CHLORIDE 10 MG: 10 TABLET ORAL at 02:01

## 2018-01-02 RX ADMIN — BACLOFEN 5 MG: 10 TABLET ORAL at 05:01

## 2018-01-02 RX ADMIN — ASPIRIN 81 MG CHEWABLE TABLET 81 MG: 81 TABLET CHEWABLE at 08:01

## 2018-01-02 RX ADMIN — ATORVASTATIN CALCIUM 40 MG: 20 TABLET, FILM COATED ORAL at 08:01

## 2018-01-02 RX ADMIN — POLYETHYLENE GLYCOL 3350 17 G: 17 POWDER, FOR SOLUTION ORAL at 08:01

## 2018-01-02 RX ADMIN — FAMOTIDINE 20 MG: 20 TABLET, FILM COATED ORAL at 08:01

## 2018-01-02 RX ADMIN — BETHANECHOL CHLORIDE 10 MG: 10 TABLET ORAL at 10:01

## 2018-01-02 RX ADMIN — BETHANECHOL CHLORIDE 10 MG: 10 TABLET ORAL at 05:01

## 2018-01-02 RX ADMIN — TAMSULOSIN HYDROCHLORIDE 0.4 MG: 0.4 CAPSULE ORAL at 10:01

## 2018-01-02 RX ADMIN — DANTROLENE SODIUM 25 MG: 25 CAPSULE ORAL at 10:01

## 2018-01-02 RX ADMIN — STANDARDIZED SENNA CONCENTRATE AND DOCUSATE SODIUM 1 TABLET: 8.6; 5 TABLET, FILM COATED ORAL at 08:01

## 2018-01-02 RX ADMIN — DONEPEZIL HYDROCHLORIDE 5 MG: 5 TABLET, FILM COATED ORAL at 08:01

## 2018-01-02 RX ADMIN — BACLOFEN 5 MG: 10 TABLET ORAL at 01:01

## 2018-01-02 RX ADMIN — FINASTERIDE 5 MG: 5 TABLET, FILM COATED ORAL at 08:01

## 2018-01-02 RX ADMIN — FAMOTIDINE 20 MG: 20 TABLET, FILM COATED ORAL at 10:01

## 2018-01-02 RX ADMIN — LEVETIRACETAM 1000 MG: 100 SOLUTION ORAL at 08:01

## 2018-01-02 RX ADMIN — LEVETIRACETAM 1000 MG: 100 SOLUTION ORAL at 10:01

## 2018-01-02 RX ADMIN — VITAMIN C 1 TABLET: TAB at 08:01

## 2018-01-02 RX ADMIN — TAMSULOSIN HYDROCHLORIDE 0.4 MG: 0.4 CAPSULE ORAL at 08:01

## 2018-01-02 NOTE — PLAN OF CARE
01/02/18 1456   Discharge Reassessment   Assessment Type Discharge Planning Reassessment   Provided patient/caregiver education on the expected discharge date and the discharge plan No   Do you have any problems affording any of your prescribed medications? No   Discharge Plan A New Nursing Home placement - long term care facility   Discharge Plan B Return to Nursing Home   Patient choice form signed by patient/caregiver No   Can the patient answer the patient profile reliably? No, cognitively impaired   Describe the patient's ability to walk at the present time. Does not walk or unable to take any steps at all   How often would a person be available to care for the patient? Often   Number of comorbid conditions (as recorded on the chart) Five or more

## 2018-01-02 NOTE — PHARMACY MED REC
"Admission Medication Reconciliation - Pharmacy Consult Note    The home medication history was taken by Mirian Reeder, Pharmacy Technician.  Based on information gathered and subsequent review by the clinical pharmacist, the items below may need attention.     You may go to "Admission" then "Reconcile Home Medications" tabs to review and/or act upon these items.     Potentially problematic discrepancies with current MAR  o Patient IS taking the following which was not ordered upon admit  o Labetalol 300 mg per G tube BID; /87, HR 98  o Patient is taking a drug DIFFERENTLY than how ordered upon admit  o Bethanechol 25 mg per G tube q8h (home regimen); inpatient order- 10 mg q8h    Please address this information as you see fit.  Feel free to contact us if you have any questions or require assistance.    Jing Magaña, PharmD  [id number]     Patient was admitted on 12/17/2017 for Seizure.      Patient's prior to admission medication regimen was as follows:  Medication Sig    acetaminophen (TYLENOL) 160 mg/5 mL Elix 20.3 mLs by Per G Tube route every 4 (four) hours as needed.    amLODIPine (NORVASC) 10 MG tablet 10 mg by Per G Tube route once daily.    ascorbic acid, vitamin C, (VITAMIN C) 500 mg/5 mL Syrp syrup 500 mg by Per G Tube route 2 (two) times daily.     B-complex with vitamin C (Z-BEC OR EQUIV) tablet 1 tablet by Per G Tube route once daily.    baclofen (LIORESAL) 20 MG tablet 20 mg by Per G Tube route 3 (three) times daily.    bethanechol (URECHOLINE) 25 MG Tab 25 mg by Per G Tube route every 8 (eight) hours.     chlorhexidine (PERIDEX) 0.12 % solution Use as directed 15 mLs in the mouth or throat 2 (two) times daily.    donepezil (ARICEPT) 5 MG tablet 5 mg by Per G Tube route once daily.    finasteride (PROSCAR) 5 mg tablet 5 mg by Per G Tube route once daily.    FLUoxetine (PROZAC) 20 MG capsule 20 mg by Per G Tube route once daily.    labetalol (NORMODYNE) 300 MG tablet 300 mg by Per G Tube " route 2 (two) times daily.    levothyroxine (SYNTHROID) 75 MCG tablet 75 mcg by Per G Tube route once daily.    lidocaine 5 % Gel Apply topically. For trach change    loperamide (ANTI-DIARRHEAL, LOPERAMIDE,) 2 mg capsule 2 mg by Per G Tube route. Take 1 tablet per PEG after each diarrhea to a max of 8 doses/24 hours    multivitamin (ONE DAILY MULTIVITAMIN) per tablet 1 tablet by Per G Tube route once daily.    mupirocin (BACTROBAN) 2 % ointment Apply topically 2 (two) times daily. Apply to penis two times a day until healed.    omeprazole (PRILOSEC) 40 MG capsule Take 1 capsule by g tube once daily    polyethylene glycol (GLYCOLAX) 17 gram PwPk 17 g by Per G Tube route once daily.     potassium chloride SA (K-DUR,KLOR-CON) 20 MEQ tablet Take 1 tablet per g tube twice daily    sodium phosphates (ENEMA) 19-7 gram/118 mL Enem Place 1 enema rectally every Mon, Wed, Fri.    tamsulosin (FLOMAX) 0.4 mg Cp24 0.4 mg by Per G Tube route 2 (two) times daily.    baclofen (LIORESAL) 20 MG tablet 20 mg by Per G Tube route 3 (three) times daily.    sodium phosphates (ENEMA) 19-7 gram/118 mL Enem Place 1 enema rectally every Mon, Wed, Fri.    CYANOCOBALAMIN, VITAMIN B-12, (VITAMIN B-12 ORAL) 2,500 mcg by Per G Tube route once daily.          Please add appropriate    SmartPhrase below:

## 2018-01-03 LAB
POCT GLUCOSE: 101 MG/DL (ref 70–110)
POCT GLUCOSE: 110 MG/DL (ref 70–110)
POCT GLUCOSE: 118 MG/DL (ref 70–110)
POCT GLUCOSE: 127 MG/DL (ref 70–110)
POCT GLUCOSE: 132 MG/DL (ref 70–110)
POCT GLUCOSE: 96 MG/DL (ref 70–110)

## 2018-01-03 PROCEDURE — 87088 URINE BACTERIA CULTURE: CPT

## 2018-01-03 PROCEDURE — 25000003 PHARM REV CODE 250: Performed by: PHYSICIAN ASSISTANT

## 2018-01-03 PROCEDURE — 27000221 HC OXYGEN, UP TO 24 HOURS

## 2018-01-03 PROCEDURE — 11000001 HC ACUTE MED/SURG PRIVATE ROOM

## 2018-01-03 PROCEDURE — 99900026 HC AIRWAY MAINTENANCE (STAT)

## 2018-01-03 PROCEDURE — 94761 N-INVAS EAR/PLS OXIMETRY MLT: CPT

## 2018-01-03 PROCEDURE — 25000003 PHARM REV CODE 250: Performed by: PSYCHIATRY & NEUROLOGY

## 2018-01-03 PROCEDURE — 87086 URINE CULTURE/COLONY COUNT: CPT

## 2018-01-03 PROCEDURE — 99232 SBSQ HOSP IP/OBS MODERATE 35: CPT | Mod: ,,, | Performed by: HOSPITALIST

## 2018-01-03 PROCEDURE — 25000003 PHARM REV CODE 250: Performed by: NURSE PRACTITIONER

## 2018-01-03 PROCEDURE — 87186 SC STD MICRODIL/AGAR DIL: CPT

## 2018-01-03 PROCEDURE — 63600175 PHARM REV CODE 636 W HCPCS: Performed by: PHYSICIAN ASSISTANT

## 2018-01-03 PROCEDURE — 87077 CULTURE AEROBIC IDENTIFY: CPT

## 2018-01-03 RX ADMIN — DONEPEZIL HYDROCHLORIDE 5 MG: 5 TABLET, FILM COATED ORAL at 09:01

## 2018-01-03 RX ADMIN — STANDARDIZED SENNA CONCENTRATE AND DOCUSATE SODIUM 1 TABLET: 8.6; 5 TABLET, FILM COATED ORAL at 09:01

## 2018-01-03 RX ADMIN — BETHANECHOL CHLORIDE 10 MG: 10 TABLET ORAL at 01:01

## 2018-01-03 RX ADMIN — POLYETHYLENE GLYCOL 3350 17 G: 17 POWDER, FOR SOLUTION ORAL at 09:01

## 2018-01-03 RX ADMIN — LEVETIRACETAM 1000 MG: 100 SOLUTION ORAL at 09:01

## 2018-01-03 RX ADMIN — MINERAL OIL AND WHITE PETROLATUM: 150; 830 OINTMENT OPHTHALMIC at 09:01

## 2018-01-03 RX ADMIN — BACLOFEN 5 MG: 10 TABLET ORAL at 06:01

## 2018-01-03 RX ADMIN — VITAMIN C 1 TABLET: TAB at 09:01

## 2018-01-03 RX ADMIN — TAMSULOSIN HYDROCHLORIDE 0.4 MG: 0.4 CAPSULE ORAL at 09:01

## 2018-01-03 RX ADMIN — FLUOXETINE 20 MG: 20 CAPSULE ORAL at 09:01

## 2018-01-03 RX ADMIN — FAMOTIDINE 20 MG: 20 TABLET, FILM COATED ORAL at 09:01

## 2018-01-03 RX ADMIN — BETHANECHOL CHLORIDE 10 MG: 10 TABLET ORAL at 06:01

## 2018-01-03 RX ADMIN — ASPIRIN 81 MG CHEWABLE TABLET 81 MG: 81 TABLET CHEWABLE at 09:01

## 2018-01-03 RX ADMIN — BETHANECHOL CHLORIDE 10 MG: 10 TABLET ORAL at 09:01

## 2018-01-03 RX ADMIN — ATORVASTATIN CALCIUM 40 MG: 20 TABLET, FILM COATED ORAL at 09:01

## 2018-01-03 RX ADMIN — LEVOTHYROXINE SODIUM 75 MCG: 75 TABLET ORAL at 09:01

## 2018-01-03 RX ADMIN — ENOXAPARIN SODIUM 40 MG: 100 INJECTION SUBCUTANEOUS at 04:01

## 2018-01-03 RX ADMIN — FINASTERIDE 5 MG: 5 TABLET, FILM COATED ORAL at 09:01

## 2018-01-03 RX ADMIN — DANTROLENE SODIUM 25 MG: 25 CAPSULE ORAL at 06:01

## 2018-01-03 RX ADMIN — MUPIROCIN: 20 OINTMENT TOPICAL at 09:01

## 2018-01-03 RX ADMIN — BACLOFEN 5 MG: 10 TABLET ORAL at 09:01

## 2018-01-03 RX ADMIN — DANTROLENE SODIUM 25 MG: 25 CAPSULE ORAL at 09:01

## 2018-01-03 RX ADMIN — BACLOFEN 5 MG: 10 TABLET ORAL at 01:01

## 2018-01-03 RX ADMIN — DANTROLENE SODIUM 25 MG: 25 CAPSULE ORAL at 01:01

## 2018-01-03 NOTE — PROGRESS NOTES
[facility] Hospital Medicine  Progress Note    Patient Name: Enrique Yancey  MRN: [id number]  Patient Class: IP- Inpatient   Admission Date: 12/17/2017  Length of Stay: 16 days  Attending Physician: Anna Marie Holguin MD  Primary Care Provider: Ang Hassan MD    Hospital Medicine Team: [facility] MED 1 George Freire MD    Subjective:     Principal Problem:Seizure    HPI:  Enrique Yancey is a 67 y.o. male with with a medical history significant for drug-induced dermatomyositis, essential hypertension, left ICA stenosis with subsequent MCA CVA with residual right spastic hemiparesis who presented to [facility] with new onset seizure from [facility] on 12/17/2017.  He presented to outside ED with seizure activity for approximately 20 minutes. Per EMS, he was diaphoretic on arrival and tachycardic. Per nursing home, pt is nonverbal at baseline, but can focus on the speaker. He has a trach in place. He is being admitted to [facility] for a higher level of care.     Hospital Course:  12/17: Admit to [facility], EEG pending, Keppra 1 G  at OSH and 500 Q 12 started, CTH: No acute process   12/18: Increase keppra per epilepsy, sputum culture, trend procal  12/19: Treated for ESBL UTI and HAP (respiratory cultures growing Providencia/Proteus) with Zosyn for 7 days. Patient remained clinically stable during his hospital course and discharge to [facility] deferred secondary to high settings on trach collar  12/30: Stepped-down to hospital medicine 1    Interval History:   No acute events overnight. Patient with no reported fevers and remains hemodynamically stable. Can open eyes and occasionally track but non-responsive verbally.      Review of Systems   Unable to perform ROS: Patient unresponsive     Objective:     Vital Signs (Most Recent):  Temp: 97.9 °F (36.6 °C) (01/02/18 1648)  Pulse: 86 (01/02/18 1829)  Resp: 18 (01/02/18 1829)  BP: 139/88 (01/02/18 1648)  SpO2: 96 % (01/02/18 1829) Vital Signs (24h Range):  Temp:  [97.9 °F  (36.6 °C)-99.6 °F (37.6 °C)] 97.9 °F (36.6 °C)  Pulse:  [86-98] 86  Resp:  [16-18] 18  SpO2:  [93 %-99 %] 96 %  BP: (123-139)/(72-93) 139/88     Weight: 54.4 kg (119 lb 14.9 oz)  Body mass index is 19.96 kg/m².    Intake/Output Summary (Last 24 hours) at 01/02/18 1957  Last data filed at 01/02/18 1647   Gross per 24 hour   Intake              840 ml   Output                0 ml   Net              840 ml      Physical Exam   Constitutional: He appears cachectic. He appears ill.   HENT:   Head: Normocephalic and atraumatic.   Eyes: EOM are normal. Pupils are equal, round, and reactive to light.   Neck: Neck supple.   Cardiovascular: Normal rate and regular rhythm.    Pulmonary/Chest: Effort normal. No respiratory distress.   Trach noted    Abdominal: Soft.   PEG placed   Musculoskeletal:   B/l UE & LE contracted   Neurological:   Somnolent but does open eye to voice and stimuli.  Speech: non-verbal.  Does not follow commands.       Skin: Skin is warm and dry.   Psychiatric: Cognition and memory are impaired.       Significant Labs:   BMP:   Recent Labs  Lab 01/02/18  0528   *      K 4.2   *   CO2 22*   BUN 35*   CREATININE 1.1   CALCIUM 8.9   MG 1.9     CBC:   Recent Labs  Lab 01/02/18  0528   WBC 6.21   HGB 10.1*   HCT 32.5*        Assessment/Plan:      * Seizure    - Patient without tonic-clonic episode since admission.  - Epilepsy has been following patient.  - Likely instigated by dehydration and infections (ESBL UTI and HAP - patient has completed therapy).   - Continue levetiracetam 1 g by PEG BID.          Cerebrovascular accident (CVA) due to thrombosis of left carotid artery    - Prior left MCA CVA in 2016 with residual right spastic hemiparesis, purportedly s/p CEA.   - See below.         Right spastic hemiparesis    - From previous L MCA CVA.  - Continue dantrolene 25 mg per G tube TID and baclofen 5 mg per G tube TID.          Normocytic anemia    - Hemoglobin stable at ~10,  without active bleeding.  - Likely component of anemia of chronic disease.           Benign prostatic hyperplasia with lower urinary tract symptoms    - Continue finasteride 5 mg per G tuve daily and tamsulosin 0.4 mg per G tube TID.         Hypothyroidism    - Continue levothyroxine 75 mcg per G tube daily.            Pharyngoesophageal dysphagia    - s/p CVA.  - Tolerating TF at goal rate of 40           Aphasia    - s/p CVA          CKD (chronic kidney disease) stage 3, GFR 30-59 ml/min    - Avoid nephrotic agents. ELIECER previously on admission has resolved on IVF and resolution of infections.        HTN (hypertension)                VTE Risk Mitigation         Ordered     enoxaparin injection 40 mg  Every 24 hours (non-standard times)     Route:  Subcutaneous        12/17/17 1131     Medium Risk of VTE  Once      12/17/17 0449     Place sequential compression device  Until discontinued      12/17/17 0449        Disposition: Awaiting placement to NH accepting trach collar. Patient remains hemodynamically stable.     George Freire MD  PGY-2 Internal Medicine  [phone]    Department of Hospital Medicine   [facility]                    01/03/2018                             STAFF PHYSICIAN NOTE                                   Attending Attestation for Rounds with Resident  I have reviewed and concur with the resident's history, physical, assessment, and plan.  I have personally interviewed and examined the patient at bedside and agree with the resident's findings.                                  ________________________________________                                     REASON FOR ADMISSION:     Patient is 67 y.o.male    Body mass index is 19.96 kg/m².,  Seizure

## 2018-01-03 NOTE — PLAN OF CARE
Problem: Fall Risk (Adult)  Goal: Identify Related Risk Factors and Signs and Symptoms  Related risk factors and signs and symptoms are identified upon initiation of Human Response Clinical Practice Guideline (CPG)   Outcome: Ongoing (interventions implemented as appropriate)  Bed rails raised. Put bed on lowest position. Regularly checked on patient. Will continue to monitor.

## 2018-01-03 NOTE — PLAN OF CARE
Problem: Pressure Ulcer Risk (Wing Scale) (Adult,Obstetrics,Pediatric)  Goal: Identify Related Risk Factors and Signs and Symptoms  Related risk factors and signs and symptoms are identified upon initiation of Human Response Clinical Practice Guideline (CPG)   Repositioned every 2 hours. Regular enteral feeding bolus. Checked regularly for incontinence. Will continue to monitor.

## 2018-01-03 NOTE — PLAN OF CARE
Problem: Patient Care Overview  Goal: Plan of Care Review  Outcome: Ongoing (interventions implemented as appropriate)  Pt remains free from falls and injury. Pt turned every 2 hours. Pt is non-verbal however shows no signs of pain. Bed low and locked, Call light within reach.

## 2018-01-03 NOTE — PLAN OF CARE
JOSE LUIS placed call [facility], left message for Nonya [phone] and JOSE LUIS also sent referral back to [facility] where pt initially came from. No response from [place], however [place] denied pt's return and will not readmit.     · 1/3/2018 12:38:55 PM Note: This patient is not on census and no longer has a bed in addition to the patients daughter picking up his belongings and saying that she didn't want him to return here. Felipe.  Leonardo [email or web address]  · 1/3/2018 12:38:05 PM Declined: Other  Leonardo [email or web address]@[employer]  · 1/3/2018 12:36:34 PM New: Patient Returning 1/4/18 Pamela is the  [phone]

## 2018-01-03 NOTE — PROGRESS NOTES
[facility] Hospital Medicine  Progress Note    Patient Name: Enrique Yancey  MRN: [id number]  Patient Class: IP- Inpatient   Admission Date: 12/17/2017  Length of Stay: 17 days  Attending Physician: Anna Marie Holguin MD  Primary Care Provider: Ang Hassan MD    [place] Medicine [place] [facility] [place] 1 Phu Regan MD    Subjective:     Principal Problem:Seizure    HPI:  Enrique Yancey is a 67 y.o. male with with a medical history significant for drug-induced dermatomyositis, essential hypertension, left ICA stenosis with subsequent MCA CVA with residual right spastic hemiparesis who [place] [facility] [place] from [facility] on 12/17/2017.  He presented to outside ED with seizure activity for approximately 20 minutes. Per EMS, he was diaphoretic on arrival and tachycardic. Per nursing home, pt is nonverbal at baseline, but can focus on the speaker. He has a trach in place. He is being admitted to [facility] for a higher level of care.     Hospital Course:  12/17: Admit to [facility], EEG pending, Keppra 1 G  at OSH and 500 Q 12 started, CTH: No acute process   12/18: Increase keppra per epilepsy, sputum culture, trend procal  12/19: Treated for ESBL UTI and HAP (respiratory cultures growing Providencia/Proteus) with Zosyn for 7 days. Patient remained clinically stable during his hospital course and discharge to [facility] deferred secondary to high settings on trach collar  12/30: Stepped-down to hospital medicine 1  01/03: Awaiting placement.     Interval History: NAEON.     Review of Systems   Unable to perform ROS: Patient unresponsive     Objective:     Vital Signs (Most Recent):  Temp: 98.3 °F (36.8 °C) (01/03/18 1544)  Pulse: 80 (01/03/18 1544)  Resp: 16 (01/03/18 1544)  BP: 115/66 (01/03/18 1544)  SpO2: 95 % (01/03/18 1544) Vital Signs (24h Range):  Temp:  [96.9 °F (36.1 °C)-99.1 °F (37.3 °C)] 98.3 °F (36.8 °C)  Pulse:  [78-99] 80  Resp:  [16-19] 16  SpO2:  [95 %-100 %] 95 %  BP:  (106-139)/(66-88) 115/66     Weight: 54.4 kg (119 lb 14.9 oz)  Body mass index is 19.96 kg/m².    Intake/Output Summary (Last 24 hours) at 01/03/18 1557  Last data filed at 01/03/18 0600   Gross per 24 hour   Intake             1120 ml   Output                0 ml   Net             1120 ml      Physical Exam   Constitutional: He appears cachectic. He appears ill.   HENT:   Head: Normocephalic and atraumatic.   Eyes: EOM are normal. Pupils are equal, round, and reactive to light.   Neck: Neck supple.   Cardiovascular: Normal rate and regular rhythm.    Pulmonary/Chest: Effort normal. No respiratory distress.   Trach noted    Abdominal: Soft.   PEG placed   Musculoskeletal:   B/l UE & LE contracted   Neurological:   Somnolent but does open eye to voice and stimuli.  Speech: non-verbal.  Does not follow commands.       Skin: Skin is warm and dry.   Psychiatric: Cognition and memory are impaired.       Significant Labs:   CBC:   Recent Labs  Lab 01/02/18  0528   WBC 6.21   HGB 10.1*   HCT 32.5*        CMP:   Recent Labs  Lab 01/02/18  0528      K 4.2   *   CO2 22*   *   BUN 35*   CREATININE 1.1   CALCIUM 8.9   PROT 7.8   ALBUMIN 2.4*   BILITOT 0.3   ALKPHOS 166*   AST 46*   ALT 49*   ANIONGAP 7*   EGFRNONAA >60.0       Significant Imaging: I have reviewed all pertinent imaging results/findings within the past 24 hours.    Assessment/Plan:      * Seizure    - Patient without tonic-clonic episode since admission.  - Epilepsy has been following patient.  - Likely instigated by dehydration and infections (ESBL UTI and HAP - patient has completed therapy).   - Continue levetiracetam 1 g by PEG BID.      Normocytic anemia    - Hemoglobin stable at ~10, without active bleeding.  - Likely component of anemia of chronic disease.       Benign prostatic hyperplasia with lower urinary tract symptoms    - Continue finasteride 5 mg per G tuve daily and tamsulosin 0.4 mg per G tube TID.         Hypothyroidism     - Continue levothyroxine 75 mcg per G tube daily.      Cerebrovascular accident (CVA) due to thrombosis of left carotid artery    - Prior left MCA CVA in 2016 with residual right spastic hemiparesis, purportedly s/p CEA.   - See below.         Pharyngoesophageal dysphagia    - s/p CVA.  - Tolerating TF at goal rate of 40       Right spastic hemiparesis    - From previous L MCA CVA.  - Continue dantrolene 25 mg per G tube TID and baclofen 5 mg per G tube TID      Aphasia    - s/p CVA      CKD (chronic kidney disease) stage 3, GFR 30-59 ml/min    - Avoid nephrotic agents. ELIECER previously on admission has resolved on IVF and resolution of infections.           VTE Risk Mitigation         Ordered     enoxaparin injection 40 mg  Every 24 hours (non-standard times)     Route:  Subcutaneous        12/17/17 1131     Medium Risk of VTE  Once      12/17/17 0449     Place sequential compression device  Until discontinued      12/17/17 0449              Phu Regan MD  Department of Hospital Medicine   [facility]                    01/03/2018                             STAFF PHYSICIAN NOTE                                   Attending Attestation for Rounds with Resident  I have reviewed and concur with the resident's history, physical, assessment, and plan.  I have personally interviewed and examined the patient at bedside and agree with the resident's findings.                                  ________________________________________                                     REASON FOR ADMISSION:     Patient is 67 y.o.male    Body mass index is 19.96 kg/m².,  Seizure

## 2018-01-03 NOTE — SUBJECTIVE & OBJECTIVE
Interval History: NAEON.     Review of Systems   Unable to perform ROS: Patient unresponsive     Objective:     Vital Signs (Most Recent):  Temp: 98.3 °F (36.8 °C) (01/03/18 1544)  Pulse: 80 (01/03/18 1544)  Resp: 16 (01/03/18 1544)  BP: 115/66 (01/03/18 1544)  SpO2: 95 % (01/03/18 1544) Vital Signs (24h Range):  Temp:  [96.9 °F (36.1 °C)-99.1 °F (37.3 °C)] 98.3 °F (36.8 °C)  Pulse:  [78-99] 80  Resp:  [16-19] 16  SpO2:  [95 %-100 %] 95 %  BP: (106-139)/(66-88) 115/66     Weight: 54.4 kg (119 lb 14.9 oz)  Body mass index is 19.96 kg/m².    Intake/Output Summary (Last 24 hours) at 01/03/18 1557  Last data filed at 01/03/18 0600   Gross per 24 hour   Intake             1120 ml   Output                0 ml   Net             1120 ml      Physical Exam   Constitutional: He appears cachectic. He appears ill.   HENT:   Head: Normocephalic and atraumatic.   Eyes: EOM are normal. Pupils are equal, round, and reactive to light.   Neck: Neck supple.   Cardiovascular: Normal rate and regular rhythm.    Pulmonary/Chest: Effort normal. No respiratory distress.   Trach noted    Abdominal: Soft.   PEG placed   Musculoskeletal:   B/l UE & LE contracted   Neurological:   Somnolent but does open eye to voice and stimuli.  Speech: non-verbal.  Does not follow commands.       Skin: Skin is warm and dry.   Psychiatric: Cognition and memory are impaired.       Significant Labs:   CBC:   Recent Labs  Lab 01/02/18  0528   WBC 6.21   HGB 10.1*   HCT 32.5*        CMP:   Recent Labs  Lab 01/02/18  0528      K 4.2   *   CO2 22*   *   BUN 35*   CREATININE 1.1   CALCIUM 8.9   PROT 7.8   ALBUMIN 2.4*   BILITOT 0.3   ALKPHOS 166*   AST 46*   ALT 49*   ANIONGAP 7*   EGFRNONAA >60.0       Significant Imaging: I have reviewed all pertinent imaging results/findings within the past 24 hours.

## 2018-01-03 NOTE — SUBJECTIVE & OBJECTIVE
Interval History:   No acute events overnight. Patient with no reported fevers and remains hemodynamically stable. Can open eyes and occasionally track but non-responsive verbally.      Review of Systems   Unable to perform ROS: Patient unresponsive     Objective:     Vital Signs (Most Recent):  Temp: 97.9 °F (36.6 °C) (01/02/18 1648)  Pulse: 86 (01/02/18 1829)  Resp: 18 (01/02/18 1829)  BP: 139/88 (01/02/18 1648)  SpO2: 96 % (01/02/18 1829) Vital Signs (24h Range):  Temp:  [97.9 °F (36.6 °C)-99.6 °F (37.6 °C)] 97.9 °F (36.6 °C)  Pulse:  [86-98] 86  Resp:  [16-18] 18  SpO2:  [93 %-99 %] 96 %  BP: (123-139)/(72-93) 139/88     Weight: 54.4 kg (119 lb 14.9 oz)  Body mass index is 19.96 kg/m².    Intake/Output Summary (Last 24 hours) at 01/02/18 1957  Last data filed at 01/02/18 1647   Gross per 24 hour   Intake              840 ml   Output                0 ml   Net              840 ml      Physical Exam   Constitutional: He appears cachectic. He appears ill.   HENT:   Head: Normocephalic and atraumatic.   Eyes: EOM are normal. Pupils are equal, round, and reactive to light.   Neck: Neck supple.   Cardiovascular: Normal rate and regular rhythm.    Pulmonary/Chest: Effort normal. No respiratory distress.   Trach noted    Abdominal: Soft.   PEG placed   Musculoskeletal:   B/l UE & LE contracted   Neurological:   Somnolent but does open eye to voice and stimuli.  Speech: non-verbal.  Does not follow commands.       Skin: Skin is warm and dry.   Psychiatric: Cognition and memory are impaired.       Significant Labs:   BMP:   Recent Labs  Lab 01/02/18  0528   *      K 4.2   *   CO2 22*   BUN 35*   CREATININE 1.1   CALCIUM 8.9   MG 1.9     CBC:   Recent Labs  Lab 01/02/18  0528   WBC 6.21   HGB 10.1*   HCT 32.5*

## 2018-01-04 LAB
ALBUMIN SERPL BCP-MCNC: 2.4 G/DL
ALP SERPL-CCNC: 165 U/L
ALT SERPL W/O P-5'-P-CCNC: 58 U/L
ANION GAP SERPL CALC-SCNC: 8 MMOL/L
AST SERPL-CCNC: 51 U/L
BASOPHILS # BLD AUTO: 0.04 K/UL
BASOPHILS NFR BLD: 0.7 %
BILIRUB SERPL-MCNC: 0.4 MG/DL
BUN SERPL-MCNC: 36 MG/DL
CALCIUM SERPL-MCNC: 8.9 MG/DL
CHLORIDE SERPL-SCNC: 111 MMOL/L
CO2 SERPL-SCNC: 21 MMOL/L
CREAT SERPL-MCNC: 1.1 MG/DL
DIFFERENTIAL METHOD: ABNORMAL
EOSINOPHIL # BLD AUTO: 0.4 K/UL
EOSINOPHIL NFR BLD: 6.4 %
ERYTHROCYTE [DISTWIDTH] IN BLOOD BY AUTOMATED COUNT: 16.5 %
EST. GFR  (AFRICAN AMERICAN): >60 ML/MIN/1.73 M^2
EST. GFR  (NON AFRICAN AMERICAN): >60 ML/MIN/1.73 M^2
GLUCOSE SERPL-MCNC: 97 MG/DL
HCT VFR BLD AUTO: 31.2 %
HGB BLD-MCNC: 9.9 G/DL
IMM GRANULOCYTES # BLD AUTO: 0.02 K/UL
IMM GRANULOCYTES NFR BLD AUTO: 0.4 %
LYMPHOCYTES # BLD AUTO: 0.9 K/UL
LYMPHOCYTES NFR BLD: 16.4 %
MAGNESIUM SERPL-MCNC: 1.9 MG/DL
MCH RBC QN AUTO: 30.8 PG
MCHC RBC AUTO-ENTMCNC: 31.7 G/DL
MCV RBC AUTO: 97 FL
MONOCYTES # BLD AUTO: 0.6 K/UL
MONOCYTES NFR BLD: 11.4 %
NEUTROPHILS # BLD AUTO: 3.6 K/UL
NEUTROPHILS NFR BLD: 64.7 %
NRBC BLD-RTO: 0 /100 WBC
PHOSPHATE SERPL-MCNC: 3.1 MG/DL
PLATELET # BLD AUTO: 196 K/UL
PMV BLD AUTO: 12 FL
POCT GLUCOSE: 110 MG/DL (ref 70–110)
POCT GLUCOSE: 129 MG/DL (ref 70–110)
POCT GLUCOSE: 75 MG/DL (ref 70–110)
POCT GLUCOSE: 84 MG/DL (ref 70–110)
POCT GLUCOSE: 86 MG/DL (ref 70–110)
POTASSIUM SERPL-SCNC: 4.3 MMOL/L
PROT SERPL-MCNC: 8 G/DL
RBC # BLD AUTO: 3.21 M/UL
SODIUM SERPL-SCNC: 140 MMOL/L
WBC # BLD AUTO: 5.62 K/UL

## 2018-01-04 PROCEDURE — 25000003 PHARM REV CODE 250: Performed by: NURSE PRACTITIONER

## 2018-01-04 PROCEDURE — 63600175 PHARM REV CODE 636 W HCPCS: Performed by: PHYSICIAN ASSISTANT

## 2018-01-04 PROCEDURE — 84100 ASSAY OF PHOSPHORUS: CPT

## 2018-01-04 PROCEDURE — 25000003 PHARM REV CODE 250: Performed by: PHYSICIAN ASSISTANT

## 2018-01-04 PROCEDURE — 36415 COLL VENOUS BLD VENIPUNCTURE: CPT

## 2018-01-04 PROCEDURE — 99232 SBSQ HOSP IP/OBS MODERATE 35: CPT | Mod: ,,, | Performed by: HOSPITALIST

## 2018-01-04 PROCEDURE — 11000001 HC ACUTE MED/SURG PRIVATE ROOM

## 2018-01-04 PROCEDURE — 99900026 HC AIRWAY MAINTENANCE (STAT)

## 2018-01-04 PROCEDURE — 25000003 PHARM REV CODE 250: Performed by: PSYCHIATRY & NEUROLOGY

## 2018-01-04 PROCEDURE — 83735 ASSAY OF MAGNESIUM: CPT

## 2018-01-04 PROCEDURE — 80053 COMPREHEN METABOLIC PANEL: CPT

## 2018-01-04 PROCEDURE — 85025 COMPLETE CBC W/AUTO DIFF WBC: CPT

## 2018-01-04 PROCEDURE — 27000221 HC OXYGEN, UP TO 24 HOURS

## 2018-01-04 RX ADMIN — BETHANECHOL CHLORIDE 10 MG: 10 TABLET ORAL at 02:01

## 2018-01-04 RX ADMIN — DANTROLENE SODIUM 25 MG: 25 CAPSULE ORAL at 02:01

## 2018-01-04 RX ADMIN — ATORVASTATIN CALCIUM 40 MG: 20 TABLET, FILM COATED ORAL at 09:01

## 2018-01-04 RX ADMIN — BACLOFEN 5 MG: 10 TABLET ORAL at 10:01

## 2018-01-04 RX ADMIN — BACLOFEN 5 MG: 10 TABLET ORAL at 02:01

## 2018-01-04 RX ADMIN — STANDARDIZED SENNA CONCENTRATE AND DOCUSATE SODIUM 1 TABLET: 8.6; 5 TABLET, FILM COATED ORAL at 09:01

## 2018-01-04 RX ADMIN — DONEPEZIL HYDROCHLORIDE 5 MG: 5 TABLET, FILM COATED ORAL at 09:01

## 2018-01-04 RX ADMIN — FINASTERIDE 5 MG: 5 TABLET, FILM COATED ORAL at 09:01

## 2018-01-04 RX ADMIN — TAMSULOSIN HYDROCHLORIDE 0.4 MG: 0.4 CAPSULE ORAL at 09:01

## 2018-01-04 RX ADMIN — TAMSULOSIN HYDROCHLORIDE 0.4 MG: 0.4 CAPSULE ORAL at 10:01

## 2018-01-04 RX ADMIN — MUPIROCIN: 20 OINTMENT TOPICAL at 09:01

## 2018-01-04 RX ADMIN — DANTROLENE SODIUM 25 MG: 25 CAPSULE ORAL at 06:01

## 2018-01-04 RX ADMIN — FAMOTIDINE 20 MG: 20 TABLET, FILM COATED ORAL at 10:01

## 2018-01-04 RX ADMIN — VITAMIN C 1 TABLET: TAB at 09:01

## 2018-01-04 RX ADMIN — FAMOTIDINE 20 MG: 20 TABLET, FILM COATED ORAL at 09:01

## 2018-01-04 RX ADMIN — BETHANECHOL CHLORIDE 10 MG: 10 TABLET ORAL at 10:01

## 2018-01-04 RX ADMIN — LEVETIRACETAM 1000 MG: 100 SOLUTION ORAL at 09:01

## 2018-01-04 RX ADMIN — LEVOTHYROXINE SODIUM 75 MCG: 75 TABLET ORAL at 09:01

## 2018-01-04 RX ADMIN — ENOXAPARIN SODIUM 40 MG: 100 INJECTION SUBCUTANEOUS at 05:01

## 2018-01-04 RX ADMIN — BACLOFEN 5 MG: 10 TABLET ORAL at 06:01

## 2018-01-04 RX ADMIN — BETHANECHOL CHLORIDE 10 MG: 10 TABLET ORAL at 06:01

## 2018-01-04 RX ADMIN — DANTROLENE SODIUM 25 MG: 25 CAPSULE ORAL at 10:01

## 2018-01-04 RX ADMIN — POLYETHYLENE GLYCOL 3350 17 G: 17 POWDER, FOR SOLUTION ORAL at 09:01

## 2018-01-04 RX ADMIN — FLUOXETINE 20 MG: 20 CAPSULE ORAL at 09:01

## 2018-01-04 RX ADMIN — LEVETIRACETAM 1000 MG: 100 SOLUTION ORAL at 10:01

## 2018-01-04 RX ADMIN — ASPIRIN 81 MG CHEWABLE TABLET 81 MG: 81 TABLET CHEWABLE at 09:01

## 2018-01-04 RX ADMIN — MINERAL OIL AND WHITE PETROLATUM: 150; 830 OINTMENT OPHTHALMIC at 10:01

## 2018-01-04 RX ADMIN — MUPIROCIN: 20 OINTMENT TOPICAL at 10:01

## 2018-01-04 NOTE — ASSESSMENT & PLAN NOTE
Nutrition Problem  Inadequate oral intake    Related to (etiology):   dysphagia    Signs and Symptoms (as evidenced by):   Pt NPO w/ trach. TF running at goal  Rate via PEG    Interventions/Recommendations (treatment strategy):  See recs above    Nutrition Diagnosis Status:   Continues

## 2018-01-04 NOTE — PLAN OF CARE
Problem: Patient Care Overview  Goal: Plan of Care Review  Outcome: Ongoing (interventions implemented as appropriate)  Recommendations     Recommendation/Intervention: Pt with 50lb weight loss over past 11 months. Pt with generalized wasting. Recommend increasing TF. Isosource 1.5 @ 45mL/hr. Hold for residuals >500mL. RD to monitor.  Goals: Meet % EEN, EPN  Nutrition Goal Status: goal met  Communication of RD Recs: reviewed with RN    Assessment and Plan         Pharyngoesophageal dysphagia     Nutrition Problem  Inadequate oral intake     Related to (etiology):   dysphagia     Signs and Symptoms (as evidenced by):   Pt NPO w/ trach. TF running at goal  Rate via PEG     Interventions/Recommendations (treatment strategy):  See recs above     Nutrition Diagnosis Status:   Continues

## 2018-01-04 NOTE — PROGRESS NOTES
[facility]  Adult Nutrition  Progress Note    SUMMARY     Recommendations    Recommendation/Intervention: Pt with 50lb weight loss over past 11 months. Pt with generalized wasting. Recommend increasing TF. Isosource 1.5 @ 45mL/hr. Hold for residuals >500mL. RD to monitor.  Goals: Meet % EEN, EPN  Nutrition Goal Status: goal met  Communication of RD Recs: reviewed with RN    Reason for Assessment    Reason for Assessment: RD follow-up  Diagnosis: other (see comments) (Seizure)  Relevent Medical History: HTN, PEG placement (12/2016)   Interdisciplinary Rounds: did not attend  General Information Comments: Pt remains on trach collar. Pt nonverbal, does not follow commands. Per chart review,  TF isosource 1.5 running at goal rate of 40ml/hr. RN reports no conplication/ residuals  Nutrition Discharge Planning: optimal nutrition via PEG with tolerance    Nutrition Prescription Ordered    Current Diet Order: NPO  Nutrition Order Comments: TF at goal  Current Nutrition Support Formula Ordered: Isosource 1.5  Current Nutrition Support Rate Ordered: 40 (ml)  Current Nutrition Support Frequency Ordered: mL/hr     Evaluation of Received Nutrients/Fluid Intake    Enteral Calories (kcal): 1440  Enteral Protein (gm): 65  Enteral (Free Water) Fluid (mL): 733  Free Water Flush Fluid (mL): 800   Energy Calories Required: meeting needs  % Kcal Needs: 92   Protein Required: meeting needs  % Protein Needs: 100  IV Fluid (mL): 0   I/O: +I/O, low UOP        Intake/Output Summary (Last 24 hours) at 01/04/18 1410  Last data filed at 01/03/18 2300   Gross per 24 hour   Intake              880 ml   Output                0 ml   Net              880 ml   Fluid Required: meeting needs  Comments: 20mL residuals 12/28  Tolerance: tolerating  % Intake of Estimated Energy Needs: 75 - 100 %  % Meal Intake: 100%     Nutrition Risk Screen     Nutrition Risk Screen: tube feeding or parenteral nutrition, other (see comments)  "(trach)    Nutrition/Diet History    Patient Reported Diet/Restrictions/Preferences: other (see comments) (SHELLY)  Typical Food/Fluid Intake: TF at goal, tolerating appropriately per nsg.  Food Preferences: SHELLY Latter-day/cultural food preferences.   Factors Affecting Nutritional Intake: impaired cognitive status/motor control, NPO, chewing difficulties/inability to chew food, difficulty/impaired swallowing     Labs/Tests/Procedures/Meds    Pertinent Labs Reviewed: reviewed, pertinent  Pertinent Labs Comments: BUN-36, Alk PO-165, AST-51, ALT-58  Pertinent Medications Reviewed: reviewed, pertinent  Pertinent Medications Comments: statin, b-complex, insulin (PRN)    Physical Findings    Overall Physical Appearance: generalized wasting, other (see comments) (trach collar)  Tubes: gastrostomy tube  Oral/Mouth Cavity: WDL  Skin: intact    Anthropometrics    Temp: 98 °F (36.7 °C)  Height: 5' 5" (165.1 cm) (Obtained from chart review)  Weight Method: Bed Scale  Weight: 54.4 kg (119 lb 14.9 oz)  Ideal Body Weight (IBW), Male: 136 lb  % Ideal Body Weight, Male (lb): 88.18 lb  BMI (Calculated): 20  BMI Grade: 18.5-24.9 - normal  Usual Body Weight (UBW), k.6 kg  % Usual Body Weight: 70.25  % Weight Change From Usual Weight: -29.9 %     Estimated/Assessed Needs    Weight Used For Calorie Calculations: 54.4 kg (119 lb 14.9 oz)   Energy Calorie Requirements (kcal): 1563 kcal/d  Energy Need Method: [place]-St Jeor (1.25 PAL)  RMR ([place]-St. Jeor Equation): 1250.88   Weight Used For Protein Calculations: 54.4 kg (119 lb 14.9 oz)  Protein Requirements: 55-65 g/d (1-1.2  g/kg)  Fluid Need Method: RDA Method, other (see comments) (Per MD or 1 mL/kcal)  RDA Method (mL): 1563       Assessment and Plan    Pharyngoesophageal dysphagia    Nutrition Problem  Inadequate oral intake    Related to (etiology):   dysphagia    Signs and Symptoms (as evidenced by):   Pt NPO w/ trach. TF running at goal  Rate via " PEG    Interventions/Recommendations (treatment strategy):  See recs above    Nutrition Diagnosis Status:   Continues                  Monitor and Evaluation    Food and Nutrient Intake: enteral nutrition intake  Food and Nutrient Adminstration: enteral and parenteral nutrition administration  Physical Activity and Function: nutrition-related ADLs and IADLs  Anthropometric Measurements: weight, weight change  Biochemical Data, Medical Tests and Procedures: lipid profile, inflammatory profile, glucose/endocrine profile, gastrointestinal profile, electrolyte and renal panel  Nutrition-Focused Physical Findings: overall appearance    Nutrition Risk    Level of Risk: other (see comments) (f/u 1x/week)    Nutrition Follow-Up    RD Follow-up?: Yes

## 2018-01-05 PROBLEM — R53.81 DEBILITY: Status: ACTIVE | Noted: 2018-01-05

## 2018-01-05 LAB
POCT GLUCOSE: 104 MG/DL (ref 70–110)
POCT GLUCOSE: 109 MG/DL (ref 70–110)
POCT GLUCOSE: 111 MG/DL (ref 70–110)
POCT GLUCOSE: 117 MG/DL (ref 70–110)
POCT GLUCOSE: 97 MG/DL (ref 70–110)

## 2018-01-05 PROCEDURE — 25000003 PHARM REV CODE 250: Performed by: NURSE PRACTITIONER

## 2018-01-05 PROCEDURE — 25000003 PHARM REV CODE 250: Performed by: PHYSICIAN ASSISTANT

## 2018-01-05 PROCEDURE — 63600175 PHARM REV CODE 636 W HCPCS: Performed by: PHYSICIAN ASSISTANT

## 2018-01-05 PROCEDURE — 99900026 HC AIRWAY MAINTENANCE (STAT)

## 2018-01-05 PROCEDURE — 27000221 HC OXYGEN, UP TO 24 HOURS

## 2018-01-05 PROCEDURE — 25000003 PHARM REV CODE 250: Performed by: PSYCHIATRY & NEUROLOGY

## 2018-01-05 PROCEDURE — 94761 N-INVAS EAR/PLS OXIMETRY MLT: CPT

## 2018-01-05 PROCEDURE — 11000001 HC ACUTE MED/SURG PRIVATE ROOM

## 2018-01-05 PROCEDURE — 99233 SBSQ HOSP IP/OBS HIGH 50: CPT | Mod: ,,, | Performed by: HOSPITALIST

## 2018-01-05 RX ADMIN — VITAMIN C 1 TABLET: TAB at 09:01

## 2018-01-05 RX ADMIN — MUPIROCIN: 20 OINTMENT TOPICAL at 10:01

## 2018-01-05 RX ADMIN — TAMSULOSIN HYDROCHLORIDE 0.4 MG: 0.4 CAPSULE ORAL at 10:01

## 2018-01-05 RX ADMIN — STANDARDIZED SENNA CONCENTRATE AND DOCUSATE SODIUM 1 TABLET: 8.6; 5 TABLET, FILM COATED ORAL at 09:01

## 2018-01-05 RX ADMIN — DONEPEZIL HYDROCHLORIDE 5 MG: 5 TABLET, FILM COATED ORAL at 09:01

## 2018-01-05 RX ADMIN — ATORVASTATIN CALCIUM 40 MG: 20 TABLET, FILM COATED ORAL at 09:01

## 2018-01-05 RX ADMIN — FINASTERIDE 5 MG: 5 TABLET, FILM COATED ORAL at 09:01

## 2018-01-05 RX ADMIN — LEVOTHYROXINE SODIUM 75 MCG: 75 TABLET ORAL at 09:01

## 2018-01-05 RX ADMIN — LEVETIRACETAM 1000 MG: 100 SOLUTION ORAL at 10:01

## 2018-01-05 RX ADMIN — DANTROLENE SODIUM 25 MG: 25 CAPSULE ORAL at 05:01

## 2018-01-05 RX ADMIN — BACLOFEN 5 MG: 10 TABLET ORAL at 10:01

## 2018-01-05 RX ADMIN — BETHANECHOL CHLORIDE 10 MG: 10 TABLET ORAL at 10:01

## 2018-01-05 RX ADMIN — FAMOTIDINE 20 MG: 20 TABLET, FILM COATED ORAL at 09:01

## 2018-01-05 RX ADMIN — DANTROLENE SODIUM 25 MG: 25 CAPSULE ORAL at 10:01

## 2018-01-05 RX ADMIN — FAMOTIDINE 20 MG: 20 TABLET, FILM COATED ORAL at 10:01

## 2018-01-05 RX ADMIN — MINERAL OIL AND WHITE PETROLATUM: 150; 830 OINTMENT OPHTHALMIC at 10:01

## 2018-01-05 RX ADMIN — BETHANECHOL CHLORIDE 10 MG: 10 TABLET ORAL at 05:01

## 2018-01-05 RX ADMIN — ASPIRIN 81 MG CHEWABLE TABLET 81 MG: 81 TABLET CHEWABLE at 10:01

## 2018-01-05 RX ADMIN — FLUOXETINE 20 MG: 20 CAPSULE ORAL at 09:01

## 2018-01-05 RX ADMIN — BACLOFEN 5 MG: 10 TABLET ORAL at 05:01

## 2018-01-05 RX ADMIN — ENOXAPARIN SODIUM 40 MG: 100 INJECTION SUBCUTANEOUS at 05:01

## 2018-01-05 RX ADMIN — POLYETHYLENE GLYCOL 3350 17 G: 17 POWDER, FOR SOLUTION ORAL at 10:01

## 2018-01-05 NOTE — PLAN OF CARE
Problem: Patient Care Overview  Goal: Plan of Care Review  Outcome: Ongoing (interventions implemented as appropriate)  Patient remains free from falls and injury.Pt is non-verbal and contracted, turned every 2 hours. Mouth care provided. Bed low and locked, Call light within reach.

## 2018-01-05 NOTE — SUBJECTIVE & OBJECTIVE
Interval History:   NAEON, TFs running at 40cc/hr increase to 45cc./hr, pt remains severely debilitated still pending re-evaluation by facilities that can provide for him w/ PEG & Trach, will initiate GOC discussion w/ daughter while he is inpatient, plan for discussion Sunday (1/7) morning    Review of Systems   Unable to perform ROS: Acuity of condition     Objective:     Vital Signs (Most Recent):  Temp: 97.3 °F (36.3 °C) (01/05/18 1114)  Pulse: 79 (01/05/18 1114)  Resp: 18 (01/05/18 1114)  BP: 128/75 (01/05/18 1114)  SpO2: 96 % (01/05/18 1217) Vital Signs (24h Range):  Temp:  [97.3 °F (36.3 °C)-99.4 °F (37.4 °C)] 97.3 °F (36.3 °C)  Pulse:  [75-93] 79  Resp:  [16-18] 18  SpO2:  [94 %-100 %] 96 %  BP: (109-133)/(65-82) 128/75     Weight: 54.4 kg (119 lb 14.9 oz)  Body mass index is 19.96 kg/m².    Intake/Output Summary (Last 24 hours) at 01/05/18 1524  Last data filed at 01/05/18 1114   Gross per 24 hour   Intake             1110 ml   Output              200 ml   Net              910 ml      Physical Exam   Constitutional: He appears cachectic. He appears ill.   HENT:   Head: Normocephalic and atraumatic.   Eyes: EOM are normal. Pupils are equal, round, and reactive to light.   Neck: Neck supple.   Cardiovascular: Normal rate and regular rhythm.    Pulmonary/Chest: Effort normal. No respiratory distress.   Trach noted    Abdominal: Soft.   PEG placed   Musculoskeletal:   B/l UE & LE contracted   Neurological:   Somnolent but does open eye to voice and stimuli.  Speech: non-verbal.  Does not follow commands.       Skin: Skin is warm and dry.   Psychiatric: Cognition and memory are impaired.       Significant Labs:   CBC:     Recent Labs  Lab 01/04/18  0355   WBC 5.62   HGB 9.9*   HCT 31.2*        CMP:     Recent Labs  Lab 01/04/18  0355      K 4.3   *   CO2 21*   GLU 97   BUN 36*   CREATININE 1.1   CALCIUM 8.9   PROT 8.0   ALBUMIN 2.4*   BILITOT 0.4   ALKPHOS 165*   AST 51*   ALT 58*   ANIONGAP 8    EGFRNONAA >60.0       Significant Imaging: I have reviewed all pertinent imaging results/findings within the past 24 hours.

## 2018-01-05 NOTE — ASSESSMENT & PLAN NOTE
- Patient without seizure activity since admission.  - Likely instigated by dehydration and infections (ESBL UTI and HAP - patient has completed therapy).   - Continue levetiracetam 1 g by PEG BID.

## 2018-01-05 NOTE — PROGRESS NOTES
[facility] Hospital Medicine  Progress Note    Patient Name: Enrique Yancey  MRN: [id number]  Patient Class: IP- Inpatient   Admission Date: 12/17/2017  Length of Stay: 19 days  Attending Physician: Anna Marie Holguin MD  Primary Care Provider: Ang Hassan MD    Hospital Medicine Team: [facility] HOSP MED 1 Ivonne Hess MD    Subjective:     Principal Problem:Seizure    HPI:  Enrique Yancey is a 67 y.o. male with with a medical history significant for drug-induced dermatomyositis, essential hypertension, left ICA stenosis with subsequent MCA CVA with residual right spastic hemiparesis who presented to [facility].  He presented to outside ED with seizure activity for approximately 20 minutes. Per EMS, he was diaphoretic on arrival and tachycardic. Per nursing home, pt is nonverbal at baseline, but can focus on the speaker. He has a trach in place. He is being admitted to [facility] for a higher level of care.     Hospital Course:  12/17: Admit to [facility], EEG pending, Keppra 1 G  at OSH and 500 Q 12 started, CTH: No acute process   12/18: Increase keppra per epilepsy, sputum culture, trend procal  12/19: Treated for ESBL UTI and HAP (respiratory cultures growing Providencia/Proteus) with Zosyn for 7 days. Patient remained clinically stable during his hospital course and discharge to [facility] deferred secondary to high settings on trach collar  12/30: Stepped-down to hospital medicine 1  01/03: Awaiting placement.   01/04: No acute issues over night. Awaiting placement.   1/05: ERVIN TFs running at 40cc/hr increase to 45cc./hr, pt remains severely debilitated still pending re-evaluation by facilities that can provide for him w/ PEG & Trach, will initiate GOC discussion w/ daughter while he is inpatient, plan for discussion Sunday (1/7) morning    Interval History:   ERVIN TFs running at 40cc/hr increase to 45cc./hr, pt remains severely debilitated still pending  re-evaluation by facilities that can provide for him w/ PEG & Trach, will initiate GOC discussion w/ daughter while he is inpatient, plan for discussion Sunday (1/7) morning    Review of Systems   Unable to perform ROS: Acuity of condition     Objective:     Vital Signs (Most Recent):  Temp: 97.3 °F (36.3 °C) (01/05/18 1114)  Pulse: 79 (01/05/18 1114)  Resp: 18 (01/05/18 1114)  BP: 128/75 (01/05/18 1114)  SpO2: 96 % (01/05/18 1217) Vital Signs (24h Range):  Temp:  [97.3 °F (36.3 °C)-99.4 °F (37.4 °C)] 97.3 °F (36.3 °C)  Pulse:  [75-93] 79  Resp:  [16-18] 18  SpO2:  [94 %-100 %] 96 %  BP: (109-133)/(65-82) 128/75     Weight: 54.4 kg (119 lb 14.9 oz)  Body mass index is 19.96 kg/m².    Intake/Output Summary (Last 24 hours) at 01/05/18 1524  Last data filed at 01/05/18 1114   Gross per 24 hour   Intake             1110 ml   Output              200 ml   Net              910 ml      Physical Exam   Constitutional: He appears cachectic. He appears ill.   HENT:   Head: Normocephalic and atraumatic.   Eyes: EOM are normal. Pupils are equal, round, and reactive to light.   Neck: Neck supple.   Cardiovascular: Normal rate and regular rhythm.    Pulmonary/Chest: Effort normal. No respiratory distress.   Trach noted    Abdominal: Soft.   PEG placed   Musculoskeletal:   B/l UE & LE contracted   Neurological:   Somnolent but does open eye to voice and stimuli.  Speech: non-verbal.  Does not follow commands.       Skin: Skin is warm and dry.   Psychiatric: Cognition and memory are impaired.       Significant Labs:   CBC:     Recent Labs  Lab 01/04/18  0355   WBC 5.62   HGB 9.9*   HCT 31.2*        CMP:     Recent Labs  Lab 01/04/18  0355      K 4.3   *   CO2 21*   GLU 97   BUN 36*   CREATININE 1.1   CALCIUM 8.9   PROT 8.0   ALBUMIN 2.4*   BILITOT 0.4   ALKPHOS 165*   AST 51*   ALT 58*   ANIONGAP 8   EGFRNONAA >60.0       Significant Imaging: I have reviewed all pertinent imaging results/findings within the past  24 hours.    Assessment/Plan:      * Seizure    - Patient without seizure activity since admission.  - Likely instigated by dehydration and infections (ESBL UTI and HAP - patient has completed therapy).   - Continue levetiracetam 1 g by PEG BID.          Debility      Pt remains severely debilitated still pending re-evaluation by facilities that can provide for him w/ PEG & Trach, will initiate GOC discussion w/ daughter while he is inpatient, plan for discussion Sunday (1/7) morning        Normocytic anemia    - Hemoglobin stable at ~10, without active bleeding.  - Likely component of anemia of chronic disease.           Benign prostatic hyperplasia with lower urinary tract symptoms    - Continue finasteride 5 mg per G tuve daily and tamsulosin 0.4 mg per G tube TID.         Hypothyroidism    - Continue levothyroxine 75 mcg per G tube daily.            Cerebrovascular accident (CVA) due to thrombosis of left carotid artery    - Prior left MCA CVA in 2016 with residual right spastic hemiparesis, purportedly s/p CEA.   - See below.         Pharyngoesophageal dysphagia    - s/p CVA.  - Tolerating TF at goal rate of 40, increase to goal of 45cc/hr today          Right spastic hemiparesis    - From previous L MCA CVA.  - Continue dantrolene 25 mg per G tube TID and baclofen 5 mg per G tube TID.          Aphasia    - s/p CVA          CKD (chronic kidney disease) stage 3, GFR 30-59 ml/min    - Avoid nephrotic agents  - Renal function at baseline  - continue to monitor         HTN (hypertension)                VTE Risk Mitigation         Ordered     enoxaparin injection 40 mg  Every 24 hours (non-standard times)     Route:  Subcutaneous        12/17/17 1131     Medium Risk of VTE  Once      12/17/17 0449     Place sequential compression device  Until discontinued      12/17/17 0449              Ivonne Hess MD  Department of Hospital Medicine   [facility]                    01/05/2018                              STAFF PHYSICIAN NOTE                                   Attending Attestation for Rounds with Resident  I have reviewed and concur with the resident's history, physical, assessment, and plan.  I have personally interviewed and examined the patient at bedside and agree with the resident's findings.                                  ________________________________________                                     REASON FOR ADMISSION:     Patient is 67 y.o.male    Body mass index is 19.96 kg/m².,  Seizure    :

## 2018-01-05 NOTE — PLAN OF CARE
Problem: Fall Risk (Adult)  Goal: Identify Related Risk Factors and Signs and Symptoms  Related risk factors and signs and symptoms are identified upon initiation of Human Response Clinical Practice Guideline (CPG)   Outcome: Ongoing (interventions implemented as appropriate)  Remains fall free, side rails elevated,

## 2018-01-05 NOTE — PLAN OF CARE
Problem: Pressure Ulcer Risk (Wing Scale) (Adult,Obstetrics,Pediatric)  Goal: Skin Integrity  Patient will demonstrate the desired outcomes by discharge/transition of care.   Skin remain intact, turning Q2 hours, pillows for support, skin care done QD and PRN, diapered.

## 2018-01-05 NOTE — PROGRESS NOTES
[facility] Hospital Medicine  Progress Note    Patient Name: Enrique Yancey  MRN: [id number]  Patient Class: IP- Inpatient   Admission Date: 12/17/2017  Length of Stay: 18 days  Attending Physician: Anna Marie Holguin MD  Primary Care Provider: Ang Hassan MD    Hospital Medicine Team: [facility] 1 Phu Regan MD    Subjective:     Principal Problem:Seizure    HPI:  Enrique Yancey is a 67 y.o. male with with a medical history significant for drug-induced dermatomyositis, essential hypertension, left ICA stenosis with subsequent MCA CVA with residual right spastic hemiparesis who presented to [facility] with new onset seizure from [facility] on 12/17/2017.  He presented to outside ED with seizure activity for approximately 20 minutes. Per EMS, he was diaphoretic on arrival and tachycardic. Per nursing home, pt is nonverbal at baseline, but can focus on the speaker. He has a trach in place. He is being admitted to [facility] for a higher level of care.     Hospital Course:  12/17: Admit to [facility], EEG pending, Keppra 1 G  at OSH and 500 Q 12 started, CTH: No acute process   12/18: Increase keppra per epilepsy, sputum culture, trend procal  12/19: Treated for ESBL UTI and HAP (respiratory cultures growing Providencia/Proteus) with Zosyn for 7 days. Patient remained clinically stable during his hospital course and discharge to [facility] deferred secondary to high settings on trach collar  12/30: Stepped-down to hospital medicine 1  01/03: Awaiting placement.   01/04: No acute issues over night. Awaiting placement.     Interval History: NAEON    Review of Systems   Unable to perform ROS: Acuity of condition     Objective:     Vital Signs (Most Recent):  Temp: 99 °F (37.2 °C) (01/04/18 1631)  Pulse: 86 (01/04/18 1631)  Resp: 18 (01/04/18 1631)  BP: 111/65 (01/04/18 1631)  SpO2: 100 % (01/04/18 1631) Vital Signs (24h Range):  Temp:  [98 °F (36.7 °C)-99.3 °F (37.4 °C)] 99 °F (37.2 °C)  Pulse:  [78-93] 86  Resp:   [16-18] 18  SpO2:  [96 %-100 %] 100 %  BP: (107-133)/(65-81) 111/65     Weight: 54.4 kg (119 lb 14.9 oz)  Body mass index is 19.96 kg/m².    Intake/Output Summary (Last 24 hours) at 01/04/18 1801  Last data filed at 01/03/18 2300   Gross per 24 hour   Intake              880 ml   Output                0 ml   Net              880 ml      Physical Exam   Constitutional: He appears cachectic. He appears ill.   HENT:   Head: Normocephalic and atraumatic.   Eyes: EOM are normal. Pupils are equal, round, and reactive to light.   Neck: Neck supple.   Cardiovascular: Normal rate and regular rhythm.    Pulmonary/Chest: Effort normal. No respiratory distress.   Trach noted    Abdominal: Soft.   PEG placed   Musculoskeletal:   B/l UE & LE contracted   Neurological:   Somnolent but does open eye to voice and stimuli.  Speech: non-verbal.  Does not follow commands.       Skin: Skin is warm and dry.   Psychiatric: Cognition and memory are impaired.       Significant Labs:   CBC:   Recent Labs  Lab 01/04/18  0355   WBC 5.62   HGB 9.9*   HCT 31.2*        CMP:   Recent Labs  Lab 01/04/18  0355      K 4.3   *   CO2 21*   GLU 97   BUN 36*   CREATININE 1.1   CALCIUM 8.9   PROT 8.0   ALBUMIN 2.4*   BILITOT 0.4   ALKPHOS 165*   AST 51*   ALT 58*   ANIONGAP 8   EGFRNONAA >60.0       Significant Imaging: I have reviewed all pertinent imaging results/findings within the past 24 hours.    Assessment/Plan:      * Seizure    - Patient without seizure activity since admission.  - Likely instigated by dehydration and infections (ESBL UTI and HAP - patient has completed therapy).   - Continue levetiracetam 1 g by PEG BID.      Benign prostatic hyperplasia with lower urinary tract symptoms    - Continue finasteride 5 mg per G tuve daily and tamsulosin 0.4 mg per G tube TID.       Hypothyroidism    - Continue levothyroxine 75 mcg per G tube daily.      Cerebrovascular accident (CVA) due to thrombosis of left carotid artery    -  Prior left MCA CVA in 2016 with residual right spastic hemiparesis, purportedly s/p CEA.   - See below.       Pharyngoesophageal dysphagia    - s/p CVA.  - Tolerating TF at goal rate of 40       Right spastic hemiparesis    - From previous L MCA CVA.  - Continue dantrolene 25 mg per G tube TID and baclofen 5 mg per G tube TID.      Aphasia    - s/p CVA      CKD (chronic kidney disease) stage 3, GFR 30-59 ml/min    - Avoid nephrotic agents  - Renal function at baseline  - continue to monitor         VTE Risk Mitigation         Ordered     enoxaparin injection 40 mg  Every 24 hours (non-standard times)     Route:  Subcutaneous        12/17/17 1131     Medium Risk of VTE  Once      12/17/17 0449     Place sequential compression device  Until discontinued      12/17/17 0449          Phu Regan MD  Department of Hospital Medicine   [facility]                    01/05/2018                             STAFF PHYSICIAN NOTE                                   Attending Attestation for Rounds with Resident  I have reviewed and concur with the resident's history, physical, assessment, and plan.  I have personally interviewed and examined the patient at bedside and agree with the resident's findings.                                  ________________________________________                                     REASON FOR ADMISSION:     Patient is 67 y.o.male    Body mass index is 19.96 kg/m².,  Seizure

## 2018-01-05 NOTE — SUBJECTIVE & OBJECTIVE
Interval History: NAEON    Review of Systems   Unable to perform ROS: Acuity of condition     Objective:     Vital Signs (Most Recent):  Temp: 99 °F (37.2 °C) (01/04/18 1631)  Pulse: 86 (01/04/18 1631)  Resp: 18 (01/04/18 1631)  BP: 111/65 (01/04/18 1631)  SpO2: 100 % (01/04/18 1631) Vital Signs (24h Range):  Temp:  [98 °F (36.7 °C)-99.3 °F (37.4 °C)] 99 °F (37.2 °C)  Pulse:  [78-93] 86  Resp:  [16-18] 18  SpO2:  [96 %-100 %] 100 %  BP: (107-133)/(65-81) 111/65     Weight: 54.4 kg (119 lb 14.9 oz)  Body mass index is 19.96 kg/m².    Intake/Output Summary (Last 24 hours) at 01/04/18 1801  Last data filed at 01/03/18 2300   Gross per 24 hour   Intake              880 ml   Output                0 ml   Net              880 ml      Physical Exam   Constitutional: He appears cachectic. He appears ill.   HENT:   Head: Normocephalic and atraumatic.   Eyes: EOM are normal. Pupils are equal, round, and reactive to light.   Neck: Neck supple.   Cardiovascular: Normal rate and regular rhythm.    Pulmonary/Chest: Effort normal. No respiratory distress.   Trach noted    Abdominal: Soft.   PEG placed   Musculoskeletal:   B/l UE & LE contracted   Neurological:   Somnolent but does open eye to voice and stimuli.  Speech: non-verbal.  Does not follow commands.       Skin: Skin is warm and dry.   Psychiatric: Cognition and memory are impaired.       Significant Labs:   CBC:   Recent Labs  Lab 01/04/18  0355   WBC 5.62   HGB 9.9*   HCT 31.2*        CMP:   Recent Labs  Lab 01/04/18  0355      K 4.3   *   CO2 21*   GLU 97   BUN 36*   CREATININE 1.1   CALCIUM 8.9   PROT 8.0   ALBUMIN 2.4*   BILITOT 0.4   ALKPHOS 165*   AST 51*   ALT 58*   ANIONGAP 8   EGFRNONAA >60.0       Significant Imaging: I have reviewed all pertinent imaging results/findings within the past 24 hours.

## 2018-01-05 NOTE — ASSESSMENT & PLAN NOTE
Pt remains severely debilitated still pending re-evaluation by facilities that can provide for him w/ PEG & Trach, will initiate GOC discussion w/ daughter while he is inpatient, plan for discussion Sunday (1/7) morning

## 2018-01-06 LAB
ALBUMIN SERPL BCP-MCNC: 2.3 G/DL
ALP SERPL-CCNC: 156 U/L
ALT SERPL W/O P-5'-P-CCNC: 55 U/L
ANION GAP SERPL CALC-SCNC: 7 MMOL/L
AST SERPL-CCNC: 46 U/L
BACTERIA UR CULT: NORMAL
BASOPHILS # BLD AUTO: 0.03 K/UL
BASOPHILS NFR BLD: 0.5 %
BILIRUB SERPL-MCNC: 0.4 MG/DL
BUN SERPL-MCNC: 31 MG/DL
CALCIUM SERPL-MCNC: 8.7 MG/DL
CHLORIDE SERPL-SCNC: 107 MMOL/L
CO2 SERPL-SCNC: 21 MMOL/L
CREAT SERPL-MCNC: 1 MG/DL
DIFFERENTIAL METHOD: ABNORMAL
EOSINOPHIL # BLD AUTO: 0.2 K/UL
EOSINOPHIL NFR BLD: 4 %
ERYTHROCYTE [DISTWIDTH] IN BLOOD BY AUTOMATED COUNT: 15.9 %
EST. GFR  (AFRICAN AMERICAN): >60 ML/MIN/1.73 M^2
EST. GFR  (NON AFRICAN AMERICAN): >60 ML/MIN/1.73 M^2
GLUCOSE SERPL-MCNC: 79 MG/DL
HCT VFR BLD AUTO: 30.9 %
HGB BLD-MCNC: 9.7 G/DL
IMM GRANULOCYTES # BLD AUTO: 0.03 K/UL
IMM GRANULOCYTES NFR BLD AUTO: 0.5 %
LYMPHOCYTES # BLD AUTO: 0.9 K/UL
LYMPHOCYTES NFR BLD: 16.2 %
MAGNESIUM SERPL-MCNC: 1.8 MG/DL
MCH RBC QN AUTO: 30.2 PG
MCHC RBC AUTO-ENTMCNC: 31.4 G/DL
MCV RBC AUTO: 96 FL
MONOCYTES # BLD AUTO: 0.7 K/UL
MONOCYTES NFR BLD: 12.1 %
NEUTROPHILS # BLD AUTO: 3.9 K/UL
NEUTROPHILS NFR BLD: 66.7 %
NRBC BLD-RTO: 0 /100 WBC
PHOSPHATE SERPL-MCNC: 3.1 MG/DL
PLATELET # BLD AUTO: 184 K/UL
PMV BLD AUTO: 12.2 FL
POCT GLUCOSE: 103 MG/DL (ref 70–110)
POCT GLUCOSE: 106 MG/DL (ref 70–110)
POCT GLUCOSE: 117 MG/DL (ref 70–110)
POCT GLUCOSE: 123 MG/DL (ref 70–110)
POCT GLUCOSE: 91 MG/DL (ref 70–110)
POTASSIUM SERPL-SCNC: 4.1 MMOL/L
PROT SERPL-MCNC: 7.8 G/DL
RBC # BLD AUTO: 3.21 M/UL
SODIUM SERPL-SCNC: 135 MMOL/L
WBC # BLD AUTO: 5.8 K/UL

## 2018-01-06 PROCEDURE — 11000001 HC ACUTE MED/SURG PRIVATE ROOM

## 2018-01-06 PROCEDURE — 36415 COLL VENOUS BLD VENIPUNCTURE: CPT

## 2018-01-06 PROCEDURE — 25000003 PHARM REV CODE 250: Performed by: PSYCHIATRY & NEUROLOGY

## 2018-01-06 PROCEDURE — 25000003 PHARM REV CODE 250: Performed by: NURSE PRACTITIONER

## 2018-01-06 PROCEDURE — 99233 SBSQ HOSP IP/OBS HIGH 50: CPT | Mod: ,,, | Performed by: HOSPITALIST

## 2018-01-06 PROCEDURE — 25000003 PHARM REV CODE 250: Performed by: PHYSICIAN ASSISTANT

## 2018-01-06 PROCEDURE — 99900026 HC AIRWAY MAINTENANCE (STAT)

## 2018-01-06 PROCEDURE — A4216 STERILE WATER/SALINE, 10 ML: HCPCS | Performed by: NURSE PRACTITIONER

## 2018-01-06 PROCEDURE — 85025 COMPLETE CBC W/AUTO DIFF WBC: CPT

## 2018-01-06 PROCEDURE — 84100 ASSAY OF PHOSPHORUS: CPT

## 2018-01-06 PROCEDURE — 27000221 HC OXYGEN, UP TO 24 HOURS

## 2018-01-06 PROCEDURE — 80053 COMPREHEN METABOLIC PANEL: CPT

## 2018-01-06 PROCEDURE — 94761 N-INVAS EAR/PLS OXIMETRY MLT: CPT

## 2018-01-06 PROCEDURE — 83735 ASSAY OF MAGNESIUM: CPT

## 2018-01-06 PROCEDURE — 63600175 PHARM REV CODE 636 W HCPCS: Performed by: PHYSICIAN ASSISTANT

## 2018-01-06 RX ADMIN — MUPIROCIN: 20 OINTMENT TOPICAL at 09:01

## 2018-01-06 RX ADMIN — BACLOFEN 5 MG: 10 TABLET ORAL at 06:01

## 2018-01-06 RX ADMIN — DANTROLENE SODIUM 25 MG: 25 CAPSULE ORAL at 10:01

## 2018-01-06 RX ADMIN — VITAMIN C 1 TABLET: TAB at 10:01

## 2018-01-06 RX ADMIN — BACLOFEN 5 MG: 10 TABLET ORAL at 10:01

## 2018-01-06 RX ADMIN — SODIUM CHLORIDE, PRESERVATIVE FREE 3 ML: 5 INJECTION INTRAVENOUS at 10:01

## 2018-01-06 RX ADMIN — BETHANECHOL CHLORIDE 10 MG: 10 TABLET ORAL at 10:01

## 2018-01-06 RX ADMIN — LEVOTHYROXINE SODIUM 75 MCG: 75 TABLET ORAL at 09:01

## 2018-01-06 RX ADMIN — LEVETIRACETAM 1000 MG: 100 SOLUTION ORAL at 09:01

## 2018-01-06 RX ADMIN — FAMOTIDINE 20 MG: 20 TABLET, FILM COATED ORAL at 10:01

## 2018-01-06 RX ADMIN — DANTROLENE SODIUM 25 MG: 25 CAPSULE ORAL at 01:01

## 2018-01-06 RX ADMIN — ENOXAPARIN SODIUM 40 MG: 100 INJECTION SUBCUTANEOUS at 05:01

## 2018-01-06 RX ADMIN — TAMSULOSIN HYDROCHLORIDE 0.4 MG: 0.4 CAPSULE ORAL at 09:01

## 2018-01-06 RX ADMIN — MUPIROCIN: 20 OINTMENT TOPICAL at 10:01

## 2018-01-06 RX ADMIN — DONEPEZIL HYDROCHLORIDE 5 MG: 5 TABLET, FILM COATED ORAL at 10:01

## 2018-01-06 RX ADMIN — ATORVASTATIN CALCIUM 40 MG: 20 TABLET, FILM COATED ORAL at 09:01

## 2018-01-06 RX ADMIN — BETHANECHOL CHLORIDE 10 MG: 10 TABLET ORAL at 01:01

## 2018-01-06 RX ADMIN — FLUOXETINE 20 MG: 20 CAPSULE ORAL at 09:01

## 2018-01-06 RX ADMIN — BETHANECHOL CHLORIDE 10 MG: 10 TABLET ORAL at 06:01

## 2018-01-06 RX ADMIN — FINASTERIDE 5 MG: 5 TABLET, FILM COATED ORAL at 10:01

## 2018-01-06 RX ADMIN — TAMSULOSIN HYDROCHLORIDE 0.4 MG: 0.4 CAPSULE ORAL at 10:01

## 2018-01-06 RX ADMIN — ASPIRIN 81 MG CHEWABLE TABLET 81 MG: 81 TABLET CHEWABLE at 09:01

## 2018-01-06 RX ADMIN — LEVETIRACETAM 1000 MG: 100 SOLUTION ORAL at 10:01

## 2018-01-06 RX ADMIN — STANDARDIZED SENNA CONCENTRATE AND DOCUSATE SODIUM 1 TABLET: 8.6; 5 TABLET, FILM COATED ORAL at 09:01

## 2018-01-06 RX ADMIN — MINERAL OIL AND WHITE PETROLATUM: 150; 830 OINTMENT OPHTHALMIC at 10:01

## 2018-01-06 RX ADMIN — POLYETHYLENE GLYCOL 3350 17 G: 17 POWDER, FOR SOLUTION ORAL at 09:01

## 2018-01-06 RX ADMIN — BACLOFEN 5 MG: 10 TABLET ORAL at 01:01

## 2018-01-06 RX ADMIN — DANTROLENE SODIUM 25 MG: 25 CAPSULE ORAL at 06:01

## 2018-01-06 RX ADMIN — FAMOTIDINE 20 MG: 20 TABLET, FILM COATED ORAL at 09:01

## 2018-01-06 NOTE — PLAN OF CARE
Problem: Patient Care Overview  Goal: Plan of Care Review  Outcome: Ongoing (interventions implemented as appropriate)  No falls throughout shift. No signs of pain. Patient tolerating feedings through PEG tube at goal of 45ml/hr. Patient receives 200ml free water boluses through PEG tube. Blood glucose monitored. Patient turned every 2 hours.

## 2018-01-06 NOTE — PROGRESS NOTES
[facility] Hospital Medicine  Progress Note    Patient Name: Enrique Yancey  MRN: [id number]  Patient Class: IP- Inpatient   Admission Date: 12/17/2017  Length of Stay: 20 days  Attending Physician: Anna Marie Holguin MD  Primary Care Provider: Ang Hassan MD    Hospital Medicine Team: [facility]OSP MED 1 Phu Regan MD    Subjective:     Principal Problem:Seizure    HPI:  Enrique Yancey is a 67 y.o. male with with a medical history significant for drug-induced dermatomyositis, essential hypertension, left ICA stenosis with subsequent MCA CVA with residual right spastic hemiparesis who presented to [facility] with new onset seizure from [facility] on 12/17/2017.  He presented to outside ED with seizure activity for approximately 20 minutes. Per EMS, he was diaphoretic on arrival and tachycardic. Per nursing home, pt is nonverbal at baseline, but can focus on the speaker. He has a trach in place. He is being admitted to [facility] for a higher level of care.     Hospital Course:  12/17: Admit to [facility], EEG pending, Keppra 1 G  at OSH and 500 Q 12 started, CTH: No acute process   12/18: Increase keppra per epilepsy, sputum culture, trend procal  12/19: Treated for ESBL UTI and HAP (respiratory cultures growing Providencia/Proteus) with Zosyn for 7 days. Patient remained clinically stable during his hospital course and discharge to [facility] deferred secondary to high settings on trach collar  12/30: Stepped-down to hospital medicine 1  01/03: Awaiting placement.   01/04: No acute issues over night. Awaiting placement.   1/05: NAEON, TFs running at 40cc/hr increase to 45cc./hr, pt remains severely debilitated still pending re-evaluation by facilities that can provide for him w/ PEG & Trach, will initiate GOC discussion w/ daughter while he is inpatient, plan for discussion Sunday (1/7) morning    01/06: No acute events over night.     Interval History: NAEON.     Review of Systems   Unable to perform ROS: Acuity of  condition     Objective:     Vital Signs (Most Recent):  Temp: 99 °F (37.2 °C) (01/06/18 0824)  Pulse: 86 (01/06/18 0934)  Resp: 18 (01/06/18 0934)  BP: (!) 148/82 (01/06/18 0824)  SpO2: 95 % (01/06/18 0934) Vital Signs (24h Range):  Temp:  [97.3 °F (36.3 °C)-99.8 °F (37.7 °C)] 99 °F (37.2 °C)  Pulse:  [76-91] 86  Resp:  [16-18] 18  SpO2:  [94 %-98 %] 95 %  BP: (116-148)/(66-82) 148/82     Weight: 54.4 kg (119 lb 14.9 oz)  Body mass index is 19.96 kg/m².    Intake/Output Summary (Last 24 hours) at 01/06/18 1045  Last data filed at 01/06/18 0029   Gross per 24 hour   Intake             1110 ml   Output                0 ml   Net             1110 ml      Physical Exam   Constitutional: He appears cachectic. He appears ill.   HENT:   Head: Normocephalic and atraumatic.   Eyes: EOM are normal. Pupils are equal, round, and reactive to light.   Neck: Neck supple.   Cardiovascular: Normal rate and regular rhythm.    Pulmonary/Chest: Effort normal. No respiratory distress.   Trach noted    Abdominal: Soft.   PEG placed   Musculoskeletal:   B/l UE & LE contracted   Neurological:   Somnolent but does open eye to voice and stimuli.  Speech: non-verbal.  Does not follow commands.       Skin: Skin is warm and dry.   Psychiatric: Cognition and memory are impaired.       Significant Labs:   CBC:   Recent Labs  Lab 01/06/18  0435   WBC 5.80   HGB 9.7*   HCT 30.9*        CMP:   Recent Labs  Lab 01/06/18  0435   *   K 4.1      CO2 21*   GLU 79   BUN 31*   CREATININE 1.0   CALCIUM 8.7   PROT 7.8   ALBUMIN 2.3*   BILITOT 0.4   ALKPHOS 156*   AST 46*   ALT 55*   ANIONGAP 7*   EGFRNONAA >60.0       Significant Imaging: I have reviewed all pertinent imaging results/findings within the past 24 hours.    Assessment/Plan:      * Seizure    - Patient without seizure activity since admission.  - Likely instigated by dehydration and infections (ESBL UTI and HAP - patient has completed therapy).   - Continue levetiracetam 1 g  by PEG BID.      Debility      Pt remains severely debilitated still pending re-evaluation by facilities that can provide for him w/ PEG & Trach, will initiate GOC discussion w/ daughter while he is inpatient, plan for discussion Sunday (1/7) morning      Normocytic anemia    - Hemoglobin stable at ~10, without active bleeding.  - Likely component of anemia of chronic disease.       Benign prostatic hyperplasia with lower urinary tract symptoms    - Continue finasteride 5 mg per G tuve daily and tamsulosin 0.4 mg per G tube TID.       Hypothyroidism    - Continue levothyroxine 75 mcg per G tube daily.      Cerebrovascular accident (CVA) due to thrombosis of left carotid artery    - Prior left MCA CVA in 2016 with residual right spastic hemiparesis, purportedly s/p CEA.   - See below.       Pharyngoesophageal dysphagia    - s/p CVA.  - Tolerating TF at goal rate of 40, increase to goal of 45cc/hr       Right spastic hemiparesis    - From previous L MCA CVA.  - Continue dantrolene 25 mg per G tube TID and baclofen 5 mg per G tube TID.      Aphasia    - s/p CVA      CKD (chronic kidney disease) stage 3, GFR 30-59 ml/min    - Avoid nephrotic agents  - Renal function at baseline  - continue to monitor        VTE Risk Mitigation         Ordered     enoxaparin injection 40 mg  Every 24 hours (non-standard times)     Route:  Subcutaneous        12/17/17 1131     Medium Risk of VTE  Once      12/17/17 0449     Place sequential compression device  Until discontinued      12/17/17 0449          Phu Regan MD  Department of Hospital Medicine   [facility]                    01/06/2018                             STAFF PHYSICIAN NOTE                                   Attending Attestation for Rounds with Resident  I have reviewed and concur with the resident's history, physical, assessment, and plan.  I have personally interviewed and examined the patient at bedside and agree with the resident's  findings.                                  ________________________________________                                     REASON FOR ADMISSION:     Patient is 67 y.o.male    Body mass index is 19.96 kg/m².,  Seizure

## 2018-01-06 NOTE — PLAN OF CARE
Problem: Patient Care Overview  Goal: Plan of Care Review  Outcome: Ongoing (interventions implemented as appropriate)  POC: remains in contact isolation for ESBL in urine, trach collar 28% o2, trach care done, diaper, incontinent of urine, pericare given prn, skin remain intact, fall free, peg tube to LUQ intact without complication, tolerating isosource 1.5 marvin at 45cc/hr/pump, residule 40cc noted, water flush 200cc given QID, accu check Q4 hours, WNL, seizure & aspiration precautions.

## 2018-01-06 NOTE — SUBJECTIVE & OBJECTIVE
Interval History: NAEON.     Review of Systems   Unable to perform ROS: Acuity of condition     Objective:     Vital Signs (Most Recent):  Temp: 99 °F (37.2 °C) (01/06/18 0824)  Pulse: 86 (01/06/18 0934)  Resp: 18 (01/06/18 0934)  BP: (!) 148/82 (01/06/18 0824)  SpO2: 95 % (01/06/18 0934) Vital Signs (24h Range):  Temp:  [97.3 °F (36.3 °C)-99.8 °F (37.7 °C)] 99 °F (37.2 °C)  Pulse:  [76-91] 86  Resp:  [16-18] 18  SpO2:  [94 %-98 %] 95 %  BP: (116-148)/(66-82) 148/82     Weight: 54.4 kg (119 lb 14.9 oz)  Body mass index is 19.96 kg/m².    Intake/Output Summary (Last 24 hours) at 01/06/18 1045  Last data filed at 01/06/18 0029   Gross per 24 hour   Intake             1110 ml   Output                0 ml   Net             1110 ml      Physical Exam   Constitutional: He appears cachectic. He appears ill.   HENT:   Head: Normocephalic and atraumatic.   Eyes: EOM are normal. Pupils are equal, round, and reactive to light.   Neck: Neck supple.   Cardiovascular: Normal rate and regular rhythm.    Pulmonary/Chest: Effort normal. No respiratory distress.   Trach noted    Abdominal: Soft.   PEG placed   Musculoskeletal:   B/l UE & LE contracted   Neurological:   Somnolent but does open eye to voice and stimuli.  Speech: non-verbal.  Does not follow commands.       Skin: Skin is warm and dry.   Psychiatric: Cognition and memory are impaired.       Significant Labs:   CBC:   Recent Labs  Lab 01/06/18  0435   WBC 5.80   HGB 9.7*   HCT 30.9*        CMP:   Recent Labs  Lab 01/06/18  0435   *   K 4.1      CO2 21*   GLU 79   BUN 31*   CREATININE 1.0   CALCIUM 8.7   PROT 7.8   ALBUMIN 2.3*   BILITOT 0.4   ALKPHOS 156*   AST 46*   ALT 55*   ANIONGAP 7*   EGFRNONAA >60.0       Significant Imaging: I have reviewed all pertinent imaging results/findings within the past 24 hours.

## 2018-01-07 LAB
POCT GLUCOSE: 108 MG/DL (ref 70–110)
POCT GLUCOSE: 115 MG/DL (ref 70–110)
POCT GLUCOSE: 133 MG/DL (ref 70–110)
POCT GLUCOSE: 94 MG/DL (ref 70–110)

## 2018-01-07 PROCEDURE — 25000003 PHARM REV CODE 250: Performed by: PSYCHIATRY & NEUROLOGY

## 2018-01-07 PROCEDURE — 27000221 HC OXYGEN, UP TO 24 HOURS

## 2018-01-07 PROCEDURE — 11000001 HC ACUTE MED/SURG PRIVATE ROOM

## 2018-01-07 PROCEDURE — 25000003 PHARM REV CODE 250: Performed by: PHYSICIAN ASSISTANT

## 2018-01-07 PROCEDURE — 99900026 HC AIRWAY MAINTENANCE (STAT)

## 2018-01-07 PROCEDURE — 94761 N-INVAS EAR/PLS OXIMETRY MLT: CPT

## 2018-01-07 PROCEDURE — 63600175 PHARM REV CODE 636 W HCPCS: Performed by: PHYSICIAN ASSISTANT

## 2018-01-07 PROCEDURE — 25000003 PHARM REV CODE 250: Performed by: NURSE PRACTITIONER

## 2018-01-07 PROCEDURE — 99231 SBSQ HOSP IP/OBS SF/LOW 25: CPT | Mod: GC,,, | Performed by: HOSPITALIST

## 2018-01-07 RX ORDER — DANTROLENE SODIUM 25 MG/1
25 CAPSULE ORAL EVERY 8 HOURS
Status: DISCONTINUED | OUTPATIENT
Start: 2018-01-08 | End: 2018-01-16

## 2018-01-07 RX ADMIN — MINERAL OIL AND WHITE PETROLATUM: 150; 830 OINTMENT OPHTHALMIC at 08:01

## 2018-01-07 RX ADMIN — VITAMIN C 1 TABLET: TAB at 08:01

## 2018-01-07 RX ADMIN — LEVOTHYROXINE SODIUM 75 MCG: 75 TABLET ORAL at 08:01

## 2018-01-07 RX ADMIN — DONEPEZIL HYDROCHLORIDE 5 MG: 5 TABLET, FILM COATED ORAL at 08:01

## 2018-01-07 RX ADMIN — BETHANECHOL CHLORIDE 10 MG: 10 TABLET ORAL at 05:01

## 2018-01-07 RX ADMIN — FINASTERIDE 5 MG: 5 TABLET, FILM COATED ORAL at 08:01

## 2018-01-07 RX ADMIN — BACLOFEN 5 MG: 10 TABLET ORAL at 05:01

## 2018-01-07 RX ADMIN — BACLOFEN 5 MG: 10 TABLET ORAL at 09:01

## 2018-01-07 RX ADMIN — FAMOTIDINE 20 MG: 20 TABLET, FILM COATED ORAL at 08:01

## 2018-01-07 RX ADMIN — TAMSULOSIN HYDROCHLORIDE 0.4 MG: 0.4 CAPSULE ORAL at 08:01

## 2018-01-07 RX ADMIN — LEVETIRACETAM 1000 MG: 100 SOLUTION ORAL at 08:01

## 2018-01-07 RX ADMIN — POLYETHYLENE GLYCOL 3350 17 G: 17 POWDER, FOR SOLUTION ORAL at 08:01

## 2018-01-07 RX ADMIN — DANTROLENE SODIUM 25 MG: 25 CAPSULE ORAL at 01:01

## 2018-01-07 RX ADMIN — FLUOXETINE 20 MG: 20 CAPSULE ORAL at 08:01

## 2018-01-07 RX ADMIN — BETHANECHOL CHLORIDE 10 MG: 10 TABLET ORAL at 01:01

## 2018-01-07 RX ADMIN — DANTROLENE SODIUM 25 MG: 25 CAPSULE ORAL at 05:01

## 2018-01-07 RX ADMIN — ATORVASTATIN CALCIUM 40 MG: 20 TABLET, FILM COATED ORAL at 08:01

## 2018-01-07 RX ADMIN — BACLOFEN 5 MG: 10 TABLET ORAL at 01:01

## 2018-01-07 RX ADMIN — ENOXAPARIN SODIUM 40 MG: 100 INJECTION SUBCUTANEOUS at 05:01

## 2018-01-07 RX ADMIN — ACETAMINOPHEN 650 MG: 650 SOLUTION ORAL at 05:01

## 2018-01-07 RX ADMIN — MUPIROCIN: 20 OINTMENT TOPICAL at 08:01

## 2018-01-07 RX ADMIN — BETHANECHOL CHLORIDE 10 MG: 10 TABLET ORAL at 09:01

## 2018-01-07 RX ADMIN — STANDARDIZED SENNA CONCENTRATE AND DOCUSATE SODIUM 1 TABLET: 8.6; 5 TABLET, FILM COATED ORAL at 08:01

## 2018-01-07 RX ADMIN — ASPIRIN 81 MG CHEWABLE TABLET 81 MG: 81 TABLET CHEWABLE at 08:01

## 2018-01-07 NOTE — PLAN OF CARE
Problem: Patient Care Overview  Goal: Plan of Care Review  Outcome: Ongoing (interventions implemented as appropriate)  Vital signs stable. No falls throughout shift. No signs of pain. Patient tolerating tube feedings at goal rate of 45ml/hr. Patient receiving 200ml free water boluses through PEG tube four times a day. Blood glucose monitored every 4 hours. Patient turned every 2 hours. Tylenol administered for temperature of 100.2F.

## 2018-01-07 NOTE — ASSESSMENT & PLAN NOTE
Pt remains severely debilitated still pending re-evaluation by facilities that can provide for him w/ PEG & Trach, will initiate GOC discussion w/ daughter while he is inpatient, plan for discussion Monday (1/8) morning

## 2018-01-07 NOTE — PROGRESS NOTES
[facility] Hospital Medicine  Progress Note    Patient Name: Enrique Yancey  MRN: [id number]  Patient Class: IP- Inpatient   Admission Date: 12/17/2017  Length of Stay: 21 days  Attending Physician: Anna Marie Holguin MD  Primary Care Provider: Ang Hassan MD    Hospital Medicine Team: [facility] HOSP MED 1 Phu Regan MD    Subjective:     Principal Problem:Seizure    HPI:  Enrique Yancey is a 67 y.o. male with with a medical history significant for drug-induced dermatomyositis, essential hypertension, left ICA stenosis with subsequent MCA CVA with residual right spastic hemiparesis who presented to [facility] with new onset seizure from [facility] on 12/17/2017.  He presented to outside ED with seizure activity for approximately 20 minutes. Per EMS, he was diaphoretic on arrival and tachycardic. Per nursing home, pt is nonverbal at baseline, but can focus on the speaker. He has a trach in place. He is being admitted to [facility] for a higher level of care.     Hospital Course:  12/17: Admit to [facility], EEG pending, Keppra 1 G  at OSH and 500 Q 12 started, CTH: No acute process   12/18: Increase keppra per epilepsy, sputum culture, trend procal  12/19: Treated for ESBL UTI and HAP (respiratory cultures growing Providencia/Proteus) with Zosyn for 7 days. Patient remained clinically stable during his hospital course and discharge to [facility] deferred secondary to high settings on trach collar  12/30: Stepped-down to hospital medicine 1  01/03: Awaiting placement.   01/04: No acute issues over night. Awaiting placement.   1/05: NAEON, TFs running at 40cc/hr increase to 45cc./hr, pt remains severely debilitated still pending re-evaluation by facilities that can provide for him w/ PEG & Trach, will initiate GOC discussion w/ daughter while he is inpatient, plan for discussion Sunday (1/7) morning    01/06: No acute events over night.     01/07: No acute issue over night. Goals of cared discussion with daughter tomorrow  01/08/18. Still awaiting placement that will allow pt with trach and PEG tubes.     Interval History: NAEON.     Review of Systems   Unable to perform ROS: Acuity of condition     Objective:     Vital Signs (Most Recent):  Temp: 99.4 °F (37.4 °C) (01/07/18 0717)  Pulse: 95 (01/07/18 0717)  Resp: 18 (01/07/18 0717)  BP: 130/81 (01/07/18 0717)  SpO2: 96 % (01/07/18 1118) Vital Signs (24h Range):  Temp:  [96.6 °F (35.9 °C)-100.1 °F (37.8 °C)] 99.4 °F (37.4 °C)  Pulse:  [88-99] 95  Resp:  [18-20] 18  SpO2:  [94 %-98 %] 96 %  BP: (119-153)/(65-94) 130/81     Weight: 54.4 kg (119 lb 14.9 oz)  Body mass index is 19.96 kg/m².    Intake/Output Summary (Last 24 hours) at 01/07/18 1207  Last data filed at 01/07/18 0600   Gross per 24 hour   Intake              800 ml   Output                0 ml   Net              800 ml      Physical Exam   Constitutional: He appears cachectic. He appears ill.   HENT:   Head: Normocephalic and atraumatic.   Eyes: EOM are normal. Pupils are equal, round, and reactive to light.   Neck: Neck supple.   Cardiovascular: Normal rate and regular rhythm.    Pulmonary/Chest: Effort normal. No respiratory distress.   Trach noted    Abdominal: Soft.   PEG placed   Musculoskeletal:   B/l UE & LE contracted   Neurological:   Somnolent but does open eye to voice and stimuli.  Speech: non-verbal.  Does not follow commands.       Skin: Skin is warm and dry.   Psychiatric: Cognition and memory are impaired.       Significant Labs:   CBC:   Recent Labs  Lab 01/06/18  0435   WBC 5.80   HGB 9.7*   HCT 30.9*        CMP:   Recent Labs  Lab 01/06/18  0435   *   K 4.1      CO2 21*   GLU 79   BUN 31*   CREATININE 1.0   CALCIUM 8.7   PROT 7.8   ALBUMIN 2.3*   BILITOT 0.4   ALKPHOS 156*   AST 46*   ALT 55*   ANIONGAP 7*   EGFRNONAA >60.0       Significant Imaging: I have reviewed all pertinent imaging results/findings within the past 24 hours.    Assessment/Plan:      * Seizure    - Patient without  seizure activity since admission.  - Likely instigated by dehydration and infections (ESBL UTI and HAP - patient has completed therapy).   - Continue levetiracetam 1 g by PEG BID.      Debility      Pt remains severely debilitated still pending re-evaluation by facilities that can provide for him w/ PEG & Trach, will initiate GOC discussion w/ daughter while he is inpatient, plan for discussion Monday (1/8) morning        Normocytic anemia    - Hemoglobin stable at ~10, without active bleeding.  - Likely component of anemia of chronic disease.       Benign prostatic hyperplasia with lower urinary tract symptoms    - Continue finasteride 5 mg per G tuve daily and tamsulosin 0.4 mg per G tube TID.       Hypothyroidism    - Continue levothyroxine 75 mcg per G tube daily      Cerebrovascular accident (CVA) due to thrombosis of left carotid artery    - Prior left MCA CVA in 2016 with residual right spastic hemiparesis, purportedly s/p CEA.   - See below.       Pharyngoesophageal dysphagia    - s/p CVA.  - Tolerating TF at goal rate of 40, increase to goal of 45cc/hr       Right spastic hemiparesis    - From previous L MCA CVA.  - Continue dantrolene 25 mg per G tube TID and baclofen 5 mg per G tube TID.      Aphasia    - s/p CVA  -Patient alert to voice, does not follow command  -residual deficit from MCA stroke       CKD (chronic kidney disease) stage 3, GFR 30-59 ml/min    - Avoid nephrotic agents  - Renal function at baseline  - continue to monitor        VTE Risk Mitigation         Ordered     enoxaparin injection 40 mg  Every 24 hours (non-standard times)     Route:  Subcutaneous        12/17/17 1131     Medium Risk of VTE  Once      12/17/17 0449     Place sequential compression device  Until discontinued      12/17/17 0449          Phu Regan MD  Department of Hospital Medicine   [facility]                    01/07/2018                             STAFF PHYSICIAN NOTE                                    Attending Attestation for Rounds with Resident  I have reviewed and concur with the resident's history, physical, assessment, and plan.  I have personally interviewed and examined the patient at bedside and agree with the resident's findings.                                  ________________________________________                                     REASON FOR ADMISSION:     Patient is 67 y.o.male    Body mass index is 19.96 kg/m².,  Seizure

## 2018-01-07 NOTE — SUBJECTIVE & OBJECTIVE
Interval History: NAEON.     Review of Systems   Unable to perform ROS: Acuity of condition     Objective:     Vital Signs (Most Recent):  Temp: 99.4 °F (37.4 °C) (01/07/18 0717)  Pulse: 95 (01/07/18 0717)  Resp: 18 (01/07/18 0717)  BP: 130/81 (01/07/18 0717)  SpO2: 96 % (01/07/18 1118) Vital Signs (24h Range):  Temp:  [96.6 °F (35.9 °C)-100.1 °F (37.8 °C)] 99.4 °F (37.4 °C)  Pulse:  [88-99] 95  Resp:  [18-20] 18  SpO2:  [94 %-98 %] 96 %  BP: (119-153)/(65-94) 130/81     Weight: 54.4 kg (119 lb 14.9 oz)  Body mass index is 19.96 kg/m².    Intake/Output Summary (Last 24 hours) at 01/07/18 1207  Last data filed at 01/07/18 0600   Gross per 24 hour   Intake              800 ml   Output                0 ml   Net              800 ml      Physical Exam   Constitutional: He appears cachectic. He appears ill.   HENT:   Head: Normocephalic and atraumatic.   Eyes: EOM are normal. Pupils are equal, round, and reactive to light.   Neck: Neck supple.   Cardiovascular: Normal rate and regular rhythm.    Pulmonary/Chest: Effort normal. No respiratory distress.   Trach noted    Abdominal: Soft.   PEG placed   Musculoskeletal:   B/l UE & LE contracted   Neurological:   Somnolent but does open eye to voice and stimuli.  Speech: non-verbal.  Does not follow commands.       Skin: Skin is warm and dry.   Psychiatric: Cognition and memory are impaired.       Significant Labs:   CBC:   Recent Labs  Lab 01/06/18  0435   WBC 5.80   HGB 9.7*   HCT 30.9*        CMP:   Recent Labs  Lab 01/06/18  0435   *   K 4.1      CO2 21*   GLU 79   BUN 31*   CREATININE 1.0   CALCIUM 8.7   PROT 7.8   ALBUMIN 2.3*   BILITOT 0.4   ALKPHOS 156*   AST 46*   ALT 55*   ANIONGAP 7*   EGFRNONAA >60.0       Significant Imaging: I have reviewed all pertinent imaging results/findings within the past 24 hours.

## 2018-01-07 NOTE — PLAN OF CARE
Problem: Patient Care Overview  Goal: Plan of Care Review  Outcome: Ongoing (interventions implemented as appropriate)  Pt is responsive to voice does not talk or make sounds, is unable to follow commands, all extremities are contracted, mouth cares completed, PEG placement check, residual of 105 at 2250 and 0 at 605. Is incontinent of urine and stool, no stools during this shift. Pt is waiting for placement.

## 2018-01-08 LAB
ALBUMIN SERPL BCP-MCNC: 2.4 G/DL
ALP SERPL-CCNC: 159 U/L
ALT SERPL W/O P-5'-P-CCNC: 53 U/L
ANION GAP SERPL CALC-SCNC: 6 MMOL/L
AST SERPL-CCNC: 41 U/L
BASOPHILS # BLD AUTO: 0.01 K/UL
BASOPHILS NFR BLD: 0.2 %
BILIRUB SERPL-MCNC: 0.4 MG/DL
BUN SERPL-MCNC: 30 MG/DL
CALCIUM SERPL-MCNC: 8.2 MG/DL
CHLORIDE SERPL-SCNC: 105 MMOL/L
CO2 SERPL-SCNC: 21 MMOL/L
CREAT SERPL-MCNC: 1 MG/DL
DIFFERENTIAL METHOD: ABNORMAL
EOSINOPHIL # BLD AUTO: 0.3 K/UL
EOSINOPHIL NFR BLD: 5.4 %
ERYTHROCYTE [DISTWIDTH] IN BLOOD BY AUTOMATED COUNT: 15.8 %
EST. GFR  (AFRICAN AMERICAN): >60 ML/MIN/1.73 M^2
EST. GFR  (NON AFRICAN AMERICAN): >60 ML/MIN/1.73 M^2
GLUCOSE SERPL-MCNC: 93 MG/DL
HCT VFR BLD AUTO: 29.4 %
HGB BLD-MCNC: 9.8 G/DL
IMM GRANULOCYTES # BLD AUTO: 0.02 K/UL
IMM GRANULOCYTES NFR BLD AUTO: 0.4 %
LYMPHOCYTES # BLD AUTO: 0.8 K/UL
LYMPHOCYTES NFR BLD: 16.1 %
MAGNESIUM SERPL-MCNC: 1.9 MG/DL
MCH RBC QN AUTO: 31.4 PG
MCHC RBC AUTO-ENTMCNC: 33.3 G/DL
MCV RBC AUTO: 94 FL
MONOCYTES # BLD AUTO: 0.5 K/UL
MONOCYTES NFR BLD: 10.9 %
NEUTROPHILS # BLD AUTO: 3.3 K/UL
NEUTROPHILS NFR BLD: 67 %
NRBC BLD-RTO: 0 /100 WBC
PHOSPHATE SERPL-MCNC: 3.1 MG/DL
PLATELET # BLD AUTO: 182 K/UL
PMV BLD AUTO: 12.3 FL
POCT GLUCOSE: 103 MG/DL (ref 70–110)
POCT GLUCOSE: 118 MG/DL (ref 70–110)
POCT GLUCOSE: 87 MG/DL (ref 70–110)
POCT GLUCOSE: 90 MG/DL (ref 70–110)
POCT GLUCOSE: 93 MG/DL (ref 70–110)
POTASSIUM SERPL-SCNC: 4.1 MMOL/L
PROT SERPL-MCNC: 7.8 G/DL
RBC # BLD AUTO: 3.12 M/UL
SODIUM SERPL-SCNC: 132 MMOL/L
WBC # BLD AUTO: 4.85 K/UL

## 2018-01-08 PROCEDURE — 94761 N-INVAS EAR/PLS OXIMETRY MLT: CPT

## 2018-01-08 PROCEDURE — 99900026 HC AIRWAY MAINTENANCE (STAT)

## 2018-01-08 PROCEDURE — 25000003 PHARM REV CODE 250: Performed by: PSYCHIATRY & NEUROLOGY

## 2018-01-08 PROCEDURE — 36415 COLL VENOUS BLD VENIPUNCTURE: CPT

## 2018-01-08 PROCEDURE — 27000221 HC OXYGEN, UP TO 24 HOURS

## 2018-01-08 PROCEDURE — 84100 ASSAY OF PHOSPHORUS: CPT

## 2018-01-08 PROCEDURE — 80053 COMPREHEN METABOLIC PANEL: CPT

## 2018-01-08 PROCEDURE — 25000003 PHARM REV CODE 250: Performed by: STUDENT IN AN ORGANIZED HEALTH CARE EDUCATION/TRAINING PROGRAM

## 2018-01-08 PROCEDURE — 25000003 PHARM REV CODE 250: Performed by: NURSE PRACTITIONER

## 2018-01-08 PROCEDURE — 99233 SBSQ HOSP IP/OBS HIGH 50: CPT | Mod: ,,, | Performed by: HOSPITALIST

## 2018-01-08 PROCEDURE — 63600175 PHARM REV CODE 636 W HCPCS: Performed by: PHYSICIAN ASSISTANT

## 2018-01-08 PROCEDURE — 11000001 HC ACUTE MED/SURG PRIVATE ROOM

## 2018-01-08 PROCEDURE — 83735 ASSAY OF MAGNESIUM: CPT

## 2018-01-08 PROCEDURE — 25000003 PHARM REV CODE 250: Performed by: PHYSICIAN ASSISTANT

## 2018-01-08 PROCEDURE — 85025 COMPLETE CBC W/AUTO DIFF WBC: CPT

## 2018-01-08 RX ADMIN — LEVETIRACETAM 1000 MG: 100 SOLUTION ORAL at 09:01

## 2018-01-08 RX ADMIN — MUPIROCIN: 20 OINTMENT TOPICAL at 10:01

## 2018-01-08 RX ADMIN — VITAMIN C 1 TABLET: TAB at 09:01

## 2018-01-08 RX ADMIN — BETHANECHOL CHLORIDE 10 MG: 10 TABLET ORAL at 01:01

## 2018-01-08 RX ADMIN — BACLOFEN 5 MG: 10 TABLET ORAL at 05:01

## 2018-01-08 RX ADMIN — BACLOFEN 5 MG: 10 TABLET ORAL at 01:01

## 2018-01-08 RX ADMIN — ATORVASTATIN CALCIUM 40 MG: 20 TABLET, FILM COATED ORAL at 09:01

## 2018-01-08 RX ADMIN — FAMOTIDINE 20 MG: 20 TABLET, FILM COATED ORAL at 09:01

## 2018-01-08 RX ADMIN — DANTROLENE SODIUM 25 MG: 25 CAPSULE ORAL at 09:01

## 2018-01-08 RX ADMIN — STANDARDIZED SENNA CONCENTRATE AND DOCUSATE SODIUM 1 TABLET: 8.6; 5 TABLET, FILM COATED ORAL at 09:01

## 2018-01-08 RX ADMIN — TAMSULOSIN HYDROCHLORIDE 0.4 MG: 0.4 CAPSULE ORAL at 09:01

## 2018-01-08 RX ADMIN — DANTROLENE SODIUM 25 MG: 25 CAPSULE ORAL at 01:01

## 2018-01-08 RX ADMIN — POLYETHYLENE GLYCOL 3350 17 G: 17 POWDER, FOR SOLUTION ORAL at 09:01

## 2018-01-08 RX ADMIN — FLUOXETINE 20 MG: 20 CAPSULE ORAL at 09:01

## 2018-01-08 RX ADMIN — LEVOTHYROXINE SODIUM 75 MCG: 75 TABLET ORAL at 09:01

## 2018-01-08 RX ADMIN — BETHANECHOL CHLORIDE 10 MG: 10 TABLET ORAL at 09:01

## 2018-01-08 RX ADMIN — ENOXAPARIN SODIUM 40 MG: 100 INJECTION SUBCUTANEOUS at 05:01

## 2018-01-08 RX ADMIN — BETHANECHOL CHLORIDE 10 MG: 10 TABLET ORAL at 05:01

## 2018-01-08 RX ADMIN — BACLOFEN 5 MG: 10 TABLET ORAL at 09:01

## 2018-01-08 RX ADMIN — FINASTERIDE 5 MG: 5 TABLET, FILM COATED ORAL at 09:01

## 2018-01-08 RX ADMIN — DONEPEZIL HYDROCHLORIDE 5 MG: 5 TABLET, FILM COATED ORAL at 09:01

## 2018-01-08 RX ADMIN — MINERAL OIL AND WHITE PETROLATUM: 150; 830 OINTMENT OPHTHALMIC at 09:01

## 2018-01-08 RX ADMIN — ASPIRIN 81 MG CHEWABLE TABLET 81 MG: 81 TABLET CHEWABLE at 09:01

## 2018-01-08 RX ADMIN — DANTROLENE SODIUM 25 MG: 25 CAPSULE ORAL at 05:01

## 2018-01-08 NOTE — ASSESSMENT & PLAN NOTE
Pt remains severely debilitated still pending re-evaluation by facilities that can provide for him w/ PEG & Trach, GOC discussed with daughter who stated, would like patient full code for now.

## 2018-01-08 NOTE — PLAN OF CARE
SW met with pt's daughter face to face at her request. Pt's daughter wanted a update on placement, she claimed that [facility] had accepted, however this SW received other information not confirming pt acceptance. The following facilities denied admission: [facility], [facility]. [facility] is no longer responding to SW calls or message, yet this SW will continue to send updated clinical. Irma at [facility] confirmed that they no longer accept trach pts.   JOSE LUIS will send referral to [facility].

## 2018-01-08 NOTE — SUBJECTIVE & OBJECTIVE
Interval History: NAEON. Please see hospital course.     Review of Systems   Unable to perform ROS: Acuity of condition     Objective:     Vital Signs (Most Recent):  Temp: 99 °F (37.2 °C) (01/08/18 1149)  Pulse: 79 (01/08/18 1149)  Resp: 16 (01/08/18 1149)  BP: 124/75 (01/08/18 1149)  SpO2: 97 % (01/08/18 1149) Vital Signs (24h Range):  Temp:  [98.4 °F (36.9 °C)-100.2 °F (37.9 °C)] 99 °F (37.2 °C)  Pulse:  [79-86] 79  Resp:  [16-22] 16  SpO2:  [95 %-100 %] 97 %  BP: (116-140)/(73-88) 124/75     Weight: 54.4 kg (119 lb 14.9 oz)  Body mass index is 19.96 kg/m².    Intake/Output Summary (Last 24 hours) at 01/08/18 1517  Last data filed at 01/08/18 0952   Gross per 24 hour   Intake              440 ml   Output                0 ml   Net              440 ml      Physical Exam   Constitutional: He appears cachectic. He appears ill.   HENT:   Head: Normocephalic and atraumatic.   Eyes: EOM are normal. Pupils are equal, round, and reactive to light.   Neck: Neck supple.   Cardiovascular: Normal rate and regular rhythm.    Pulmonary/Chest: Effort normal. No respiratory distress.   Trach noted    Abdominal: Soft.   PEG placed   Musculoskeletal:   B/l UE & LE contracted   Neurological:   Somnolent but does open eye to voice and stimuli.  Speech: non-verbal.  Does not follow commands.       Skin: Skin is warm and dry.   Psychiatric: Cognition and memory are impaired.       Significant Labs:   CBC:   Recent Labs  Lab 01/08/18  0610   WBC 4.85   HGB 9.8*   HCT 29.4*        CMP:   Recent Labs  Lab 01/08/18  0610   *   K 4.1      CO2 21*   GLU 93   BUN 30*   CREATININE 1.0   CALCIUM 8.2*   PROT 7.8   ALBUMIN 2.4*   BILITOT 0.4   ALKPHOS 159*   AST 41*   ALT 53*   ANIONGAP 6*   EGFRNONAA >60.0       Significant Imaging: I have reviewed all pertinent imaging results/findings within the past 24 hours.

## 2018-01-08 NOTE — PROGRESS NOTES
[facility] Hospital Medicine  Progress Note    Patient Name: Enrique Yancey  MRN: [id number]  Patient Class: IP- Inpatient   Admission Date: 12/17/2017  Length of Stay: 22 days  Attending Physician: Anna Marie Holguin MD  Primary Care Provider: Ang Hassan MD    Hospital Medicine Team: [facility] 1 Phu Regan MD    Subjective:     Principal Problem:Seizure    HPI:  Enrique Yancey is a 67 y.o. male with with a medical history significant for drug-induced dermatomyositis, essential hypertension, left ICA stenosis with subsequent MCA CVA with residual right spastic hemiparesis who [place] [facility] [place] onset seizure from [facility] on 12/17/2017.  He presented to outside ED with seizure activity for approximately 20 minutes. Per EMS, he was diaphoretic on arrival and tachycardic. Per nursing home, pt is nonverbal at baseline, but can focus on the speaker. He has a trach in place. He is being admitted to [facility] for a higher level of care.     Hospital Course:  12/17: Admit to [facility], EEG pending, Keppra 1 G  at OSH and 500 Q 12 started, CTH: No acute process   12/18: Increase keppra per epilepsy, sputum culture, trend procal  12/19: Treated for ESBL UTI and HAP (respiratory cultures growing Providencia/Proteus) with Zosyn for 7 days. Patient remained clinically stable during his hospital course and discharge to [facility] deferred secondary to high settings on trach collar  12/30: Stepped-down to hospital medicine 1  01/03: Awaiting placement.   01/04: No acute issues over night. Awaiting placement.   1/05: NAEON, TFs running at 40cc/hr increase to 45cc./hr, pt remains severely debilitated still pending re-evaluation by facilities that can provide for him w/ PEG & Trach, will initiate GOC discussion w/ daughter while he is inpatient, plan for discussion Sunday (1/7) morning    01/06: No acute events over night.     01/07: No acute issue over night. Goals of cared discussion with daughter tomorrow  01/08/18. Still awaiting placement that will allow pt with trach and PEG tubes.     01/08 No acute events. Daughter stopped by for goals of discussion. Would like to make patient Full code, at least until he sees his granddaughter yet to be born. Has rejections from a couple of nursing homes per SW. Stays afebrile, HD stable.     Interval History: NAEON. Please see hospital course.     Review of Systems   Unable to perform ROS: Acuity of condition     Objective:     Vital Signs (Most Recent):  Temp: 99 °F (37.2 °C) (01/08/18 1149)  Pulse: 79 (01/08/18 1149)  Resp: 16 (01/08/18 1149)  BP: 124/75 (01/08/18 1149)  SpO2: 97 % (01/08/18 1149) Vital Signs (24h Range):  Temp:  [98.4 °F (36.9 °C)-100.2 °F (37.9 °C)] 99 °F (37.2 °C)  Pulse:  [79-86] 79  Resp:  [16-22] 16  SpO2:  [95 %-100 %] 97 %  BP: (116-140)/(73-88) 124/75     Weight: 54.4 kg (119 lb 14.9 oz)  Body mass index is 19.96 kg/m².    Intake/Output Summary (Last 24 hours) at 01/08/18 1517  Last data filed at 01/08/18 0952   Gross per 24 hour   Intake              440 ml   Output                0 ml   Net              440 ml      Physical Exam   Constitutional: He appears cachectic. He appears ill.   HENT:   Head: Normocephalic and atraumatic.   Eyes: EOM are normal. Pupils are equal, round, and reactive to light.   Neck: Neck supple.   Cardiovascular: Normal rate and regular rhythm.    Pulmonary/Chest: Effort normal. No respiratory distress.   Trach noted    Abdominal: Soft.   PEG placed   Musculoskeletal:   B/l UE & LE contracted   Neurological:   Somnolent but does open eye to voice and stimuli.  Speech: non-verbal.  Does not follow commands.       Skin: Skin is warm and dry.   Psychiatric: Cognition and memory are impaired.       Significant Labs:   CBC:   Recent Labs  Lab 01/08/18  0610   WBC 4.85   HGB 9.8*   HCT 29.4*        CMP:   Recent Labs  Lab 01/08/18  0610   *   K 4.1      CO2 21*   GLU 93   BUN 30*   CREATININE 1.0   CALCIUM 8.2*    PROT 7.8   ALBUMIN 2.4*   BILITOT 0.4   ALKPHOS 159*   AST 41*   ALT 53*   ANIONGAP 6*   EGFRNONAA >60.0       Significant Imaging: I have reviewed all pertinent imaging results/findings within the past 24 hours.    Assessment/Plan:      * Seizure    - Patient without seizure activity since admission.  - Likely instigated by dehydration and infections (ESBL UTI and HAP - patient has completed therapy).   - Continue levetiracetam 1 g by PEG BID.      Debility      Pt remains severely debilitated still pending re-evaluation by facilities that can provide for him w/ PEG & Trach, GOC discussed with daughter who stated, would like patient full code for now.      Normocytic anemia    - Hemoglobin stable at ~10, without active bleeding.  - Likely component of anemia of chronic disease.       Benign prostatic hyperplasia with lower urinary tract symptoms    - Continue finasteride 5 mg per G tuve daily and tamsulosin 0.4 mg per G tube TID.       Hypothyroidism    - Continue levothyroxine 75 mcg per G tube daily.      Cerebrovascular accident (CVA) due to thrombosis of left carotid artery    - Prior left MCA CVA in 2016 with residual right spastic hemiparesis, purportedly s/p CEA.   - See below.       Pharyngoesophageal dysphagia    - s/p CVA.  - Tolerating TF at goal rate of 40, increase to goal of 45cc/hr       Right spastic hemiparesis    - From previous L MCA CVA.  - Continue dantrolene 25 mg per G tube TID and baclofen 5 mg per G tube TID.      Aphasia    - s/p CVA      CKD (chronic kidney disease) stage 3, GFR 30-59 ml/min    - Avoid nephrotic agents  - Renal function at baseline  - continue to monitor         VTE Risk Mitigation         Ordered     enoxaparin injection 40 mg  Every 24 hours (non-standard times)     Route:  Subcutaneous        12/17/17 1131     Medium Risk of VTE  Once      12/17/17 9999     Place sequential compression device  Until discontinued      12/17/17 0449          Phu Regan,  MD  Department of Hospital Medicine   [facility]                    01/09/2018                             STAFF PHYSICIAN NOTE                                   Attending Attestation for Rounds with Resident  I have reviewed and concur with the resident's history, physical, assessment, and plan.  I have personally interviewed and examined the patient at bedside and agree with the resident's findings.                                  ________________________________________                                     REASON FOR ADMISSION:     Patient is 67 y.o.male    Body mass index is 19.96 kg/m².,  Seizure    PROBLEM(S):  Non-hospice Palliative Care:   Does patient have Capacity? no  Goals- DTR wants to keep pt alive until her baby is born  Code Status- FULL code  Pain management- controlled  Prognosis-  Poor, declining rapidly over past year after stroke.  High risk for recurrent pneumonia, aspiration, skin breakdown and ulcers, UTIs.  Pt loosing barriers to infection.   Family discussions- long talk w DTR on 1/8/18 to review Goals of care. Discussed everything above and below.  Functional Status - poor, contractures of all ext, immobile, trach and PEG dependent, requires total nursing assisstance    Post acute care plan: pending placement, Code status change and Hospice is appropriate when family and POA ready.

## 2018-01-09 LAB
POCT GLUCOSE: 106 MG/DL (ref 70–110)
POCT GLUCOSE: 108 MG/DL (ref 70–110)
POCT GLUCOSE: 126 MG/DL (ref 70–110)
POCT GLUCOSE: 140 MG/DL (ref 70–110)
POCT GLUCOSE: 141 MG/DL (ref 70–110)

## 2018-01-09 PROCEDURE — 25000003 PHARM REV CODE 250: Performed by: PHYSICIAN ASSISTANT

## 2018-01-09 PROCEDURE — 87040 BLOOD CULTURE FOR BACTERIA: CPT | Mod: 59

## 2018-01-09 PROCEDURE — 25000003 PHARM REV CODE 250: Performed by: NURSE PRACTITIONER

## 2018-01-09 PROCEDURE — 94761 N-INVAS EAR/PLS OXIMETRY MLT: CPT

## 2018-01-09 PROCEDURE — 99900026 HC AIRWAY MAINTENANCE (STAT)

## 2018-01-09 PROCEDURE — 25000003 PHARM REV CODE 250: Performed by: PSYCHIATRY & NEUROLOGY

## 2018-01-09 PROCEDURE — 99233 SBSQ HOSP IP/OBS HIGH 50: CPT | Mod: ,,, | Performed by: HOSPITALIST

## 2018-01-09 PROCEDURE — 25000003 PHARM REV CODE 250: Performed by: STUDENT IN AN ORGANIZED HEALTH CARE EDUCATION/TRAINING PROGRAM

## 2018-01-09 PROCEDURE — 27000221 HC OXYGEN, UP TO 24 HOURS

## 2018-01-09 PROCEDURE — 36415 COLL VENOUS BLD VENIPUNCTURE: CPT

## 2018-01-09 PROCEDURE — 27000200 HC HIGH FLOW DEL DISP CIRCUIT

## 2018-01-09 PROCEDURE — 11000001 HC ACUTE MED/SURG PRIVATE ROOM

## 2018-01-09 PROCEDURE — 87040 BLOOD CULTURE FOR BACTERIA: CPT

## 2018-01-09 PROCEDURE — 63600175 PHARM REV CODE 636 W HCPCS: Performed by: PHYSICIAN ASSISTANT

## 2018-01-09 PROCEDURE — 87086 URINE CULTURE/COLONY COUNT: CPT

## 2018-01-09 RX ORDER — CIPROFLOXACIN 250 MG/1
250 TABLET, FILM COATED ORAL EVERY 12 HOURS
Status: DISCONTINUED | OUTPATIENT
Start: 2018-01-09 | End: 2018-01-09

## 2018-01-09 RX ORDER — CIPROFLOXACIN 750 MG/1
750 TABLET, FILM COATED ORAL EVERY 12 HOURS
Status: DISCONTINUED | OUTPATIENT
Start: 2018-01-09 | End: 2018-01-10

## 2018-01-09 RX ADMIN — BETHANECHOL CHLORIDE 10 MG: 10 TABLET ORAL at 02:01

## 2018-01-09 RX ADMIN — STANDARDIZED SENNA CONCENTRATE AND DOCUSATE SODIUM 1 TABLET: 8.6; 5 TABLET, FILM COATED ORAL at 10:01

## 2018-01-09 RX ADMIN — FINASTERIDE 5 MG: 5 TABLET, FILM COATED ORAL at 10:01

## 2018-01-09 RX ADMIN — BACLOFEN 5 MG: 10 TABLET ORAL at 03:01

## 2018-01-09 RX ADMIN — FLUOXETINE 20 MG: 20 CAPSULE ORAL at 10:01

## 2018-01-09 RX ADMIN — DANTROLENE SODIUM 25 MG: 25 CAPSULE ORAL at 06:01

## 2018-01-09 RX ADMIN — VITAMIN C 1 TABLET: TAB at 10:01

## 2018-01-09 RX ADMIN — BETHANECHOL CHLORIDE 10 MG: 10 TABLET ORAL at 06:01

## 2018-01-09 RX ADMIN — CIPROFLOXACIN HYDROCHLORIDE 250 MG: 250 TABLET, FILM COATED ORAL at 10:01

## 2018-01-09 RX ADMIN — FAMOTIDINE 20 MG: 20 TABLET, FILM COATED ORAL at 09:01

## 2018-01-09 RX ADMIN — LEVOTHYROXINE SODIUM 75 MCG: 75 TABLET ORAL at 10:01

## 2018-01-09 RX ADMIN — TAMSULOSIN HYDROCHLORIDE 0.4 MG: 0.4 CAPSULE ORAL at 10:01

## 2018-01-09 RX ADMIN — LEVETIRACETAM 1000 MG: 100 SOLUTION ORAL at 10:01

## 2018-01-09 RX ADMIN — ATORVASTATIN CALCIUM 40 MG: 20 TABLET, FILM COATED ORAL at 10:01

## 2018-01-09 RX ADMIN — MUPIROCIN: 20 OINTMENT TOPICAL at 10:01

## 2018-01-09 RX ADMIN — MINERAL OIL AND WHITE PETROLATUM: 150; 830 OINTMENT OPHTHALMIC at 10:01

## 2018-01-09 RX ADMIN — DONEPEZIL HYDROCHLORIDE 5 MG: 5 TABLET, FILM COATED ORAL at 10:01

## 2018-01-09 RX ADMIN — BETHANECHOL CHLORIDE 10 MG: 10 TABLET ORAL at 09:01

## 2018-01-09 RX ADMIN — CIPROFLOXACIN HYDROCHLORIDE 750 MG: 750 TABLET, FILM COATED ORAL at 10:01

## 2018-01-09 RX ADMIN — LEVETIRACETAM 1000 MG: 100 SOLUTION ORAL at 09:01

## 2018-01-09 RX ADMIN — ENOXAPARIN SODIUM 40 MG: 100 INJECTION SUBCUTANEOUS at 05:01

## 2018-01-09 RX ADMIN — BACLOFEN 5 MG: 10 TABLET ORAL at 09:01

## 2018-01-09 RX ADMIN — DANTROLENE SODIUM 25 MG: 25 CAPSULE ORAL at 09:01

## 2018-01-09 RX ADMIN — FAMOTIDINE 20 MG: 20 TABLET, FILM COATED ORAL at 10:01

## 2018-01-09 RX ADMIN — POLYETHYLENE GLYCOL 3350 17 G: 17 POWDER, FOR SOLUTION ORAL at 10:01

## 2018-01-09 RX ADMIN — ASPIRIN 81 MG CHEWABLE TABLET 81 MG: 81 TABLET CHEWABLE at 10:01

## 2018-01-09 RX ADMIN — ACETAMINOPHEN 650 MG: 650 SOLUTION ORAL at 05:01

## 2018-01-09 RX ADMIN — BACLOFEN 5 MG: 10 TABLET ORAL at 06:01

## 2018-01-09 RX ADMIN — SODIUM CHLORIDE 500 ML: 900 INJECTION, SOLUTION INTRAVENOUS at 08:01

## 2018-01-09 RX ADMIN — DANTROLENE SODIUM 25 MG: 25 CAPSULE ORAL at 03:01

## 2018-01-09 RX ADMIN — MUPIROCIN: 20 OINTMENT TOPICAL at 09:01

## 2018-01-09 NOTE — PLAN OF CARE
01/09/18 1553   Discharge Reassessment   Assessment Type Discharge Planning Reassessment   Provided patient/caregiver education on the expected discharge date and the discharge plan Yes   Do you have any problems affording any of your prescribed medications? No   Discharge Plan A New Nursing Home placement - residential care facility   Discharge Plan B Inpatient Hospice   Patient choice form signed by patient/caregiver No   Can the patient answer the patient profile reliably? No, cognitively impaired   Describe the patient's ability to walk at the present time. Does not walk or unable to take any steps at all   How often would a person be available to care for the patient? Often   Number of comorbid conditions (as recorded on the chart) Five or more

## 2018-01-09 NOTE — PLAN OF CARE
Problem: Patient Care Overview  Goal: Plan of Care Review  VSS. Turned every 2 hours this shift. Residual checked amount range from 180-220 ML. voided incontinent in diaper.yellow in color.trach. suction PRN this shift. White yellow sputum noted. Breath sounds coarse. HOB remain 30 degrees this shift. Pillow place in potential  pressure area.

## 2018-01-09 NOTE — PLAN OF CARE
Problem: Patient Care Overview  Goal: Plan of Care Review  Pt with ongoing plan of care. See attached sections and flowsheet documentation. Tube feeds continued and tolerated at GR w/ minimal residuals. Duo nebs continued and trach suctioning completed when necessary. Enteral fluid boluses administered as ordered. Daughter and family presence promoted at bedside. Placement being discussed amongst family and SW. Awaiting [facility] acceptance for PEG and Trach. Pt remains on 5L28% FiO2, to be weaned as tolerated.     Problem: Fall Risk (Adult)  Goal: Identify Related Risk Factors and Signs and Symptoms  Related risk factors and signs and symptoms are identified upon initiation of Human Response Clinical Practice Guideline (CPG)   Outcome: Ongoing (interventions implemented as appropriate)  Pt remained free from falls: bed kept low position, frequent rounding performed, side rails up x3, incontinence care performed, bed alarm activated.     Problem: Pressure Ulcer Risk (Wing Scale) (Adult,Obstetrics,Pediatric)  Goal: Identify Related Risk Factors and Signs and Symptoms  Related risk factors and signs and symptoms are identified upon initiation of Human Response Clinical Practice Guideline (CPG)   Outcome: Ongoing (interventions implemented as appropriate)  No new skin breakdown evident this shift. Pt repositioned q2. Mepilex dressings in place to prevent breakdown to bony areas. Heel boot protectors in place. Incontinence care provided when necessary.     Problem: Seizure Disorder/Epilepsy (Adult)  Goal: Signs and Symptoms of Listed Potential Problems Will be Absent, Minimized or Managed (Seizure Disorder/Epilepsy)  Signs and symptoms of listed potential problems will be absent, minimized or managed by discharge/transition of care (reference Seizure Disorder/Epilepsy (Adult) CPG).   Outcome: Ongoing (interventions implemented as appropriate)  Antiepileptic medications continued. No seizure activity noted. Suction at  bedside.     Problem: Infection, Risk/Actual (Adult)  Goal: Identify Related Risk Factors and Signs and Symptoms  Related risk factors and signs and symptoms are identified upon initiation of Human Response Clinical Practice Guideline (CPG)   Outcome: Ongoing (interventions implemented as appropriate)  WBC WNL. Pt remained afebrile. Abx course completed previously for PNA and UTI treatment. COntact isolation precautions remain in place for ESBL in urine.

## 2018-01-10 PROBLEM — R50.9 FEVER: Status: ACTIVE | Noted: 2018-01-10

## 2018-01-10 LAB
ALBUMIN SERPL BCP-MCNC: 2.4 G/DL
ALP SERPL-CCNC: 156 U/L
ALT SERPL W/O P-5'-P-CCNC: 47 U/L
ANION GAP SERPL CALC-SCNC: 8 MMOL/L
AST SERPL-CCNC: 40 U/L
BASOPHILS # BLD AUTO: 0.02 K/UL
BASOPHILS NFR BLD: 0.3 %
BILIRUB SERPL-MCNC: 0.4 MG/DL
BUN SERPL-MCNC: 30 MG/DL
CALCIUM SERPL-MCNC: 9 MG/DL
CHLORIDE SERPL-SCNC: 106 MMOL/L
CO2 SERPL-SCNC: 18 MMOL/L
CREAT SERPL-MCNC: 1.3 MG/DL
DIFFERENTIAL METHOD: ABNORMAL
EOSINOPHIL # BLD AUTO: 0.2 K/UL
EOSINOPHIL NFR BLD: 2.6 %
ERYTHROCYTE [DISTWIDTH] IN BLOOD BY AUTOMATED COUNT: 16 %
EST. GFR  (AFRICAN AMERICAN): >60 ML/MIN/1.73 M^2
EST. GFR  (NON AFRICAN AMERICAN): 56.9 ML/MIN/1.73 M^2
GLUCOSE SERPL-MCNC: 129 MG/DL
HCT VFR BLD AUTO: 31.1 %
HGB BLD-MCNC: 10 G/DL
IMM GRANULOCYTES # BLD AUTO: 0.04 K/UL
IMM GRANULOCYTES NFR BLD AUTO: 0.5 %
LYMPHOCYTES # BLD AUTO: 0.2 K/UL
LYMPHOCYTES NFR BLD: 3 %
MAGNESIUM SERPL-MCNC: 1.9 MG/DL
MCH RBC QN AUTO: 30.9 PG
MCHC RBC AUTO-ENTMCNC: 32.2 G/DL
MCV RBC AUTO: 96 FL
MONOCYTES # BLD AUTO: 0.6 K/UL
MONOCYTES NFR BLD: 7.4 %
NEUTROPHILS # BLD AUTO: 6.6 K/UL
NEUTROPHILS NFR BLD: 86.2 %
NRBC BLD-RTO: 0 /100 WBC
PHOSPHATE SERPL-MCNC: 2.7 MG/DL
PLATELET # BLD AUTO: 178 K/UL
PMV BLD AUTO: 12 FL
POCT GLUCOSE: 111 MG/DL (ref 70–110)
POCT GLUCOSE: 119 MG/DL (ref 70–110)
POCT GLUCOSE: 119 MG/DL (ref 70–110)
POCT GLUCOSE: 131 MG/DL (ref 70–110)
POTASSIUM SERPL-SCNC: 4.4 MMOL/L
PROT SERPL-MCNC: 8 G/DL
RBC # BLD AUTO: 3.24 M/UL
SODIUM SERPL-SCNC: 132 MMOL/L
WBC # BLD AUTO: 7.67 K/UL

## 2018-01-10 PROCEDURE — 80053 COMPREHEN METABOLIC PANEL: CPT

## 2018-01-10 PROCEDURE — 25000003 PHARM REV CODE 250: Performed by: PHYSICIAN ASSISTANT

## 2018-01-10 PROCEDURE — 25000003 PHARM REV CODE 250: Performed by: STUDENT IN AN ORGANIZED HEALTH CARE EDUCATION/TRAINING PROGRAM

## 2018-01-10 PROCEDURE — 25000003 PHARM REV CODE 250: Performed by: NURSE PRACTITIONER

## 2018-01-10 PROCEDURE — 11000001 HC ACUTE MED/SURG PRIVATE ROOM

## 2018-01-10 PROCEDURE — 83735 ASSAY OF MAGNESIUM: CPT

## 2018-01-10 PROCEDURE — 25000003 PHARM REV CODE 250: Performed by: PSYCHIATRY & NEUROLOGY

## 2018-01-10 PROCEDURE — 63600175 PHARM REV CODE 636 W HCPCS: Performed by: STUDENT IN AN ORGANIZED HEALTH CARE EDUCATION/TRAINING PROGRAM

## 2018-01-10 PROCEDURE — 89220 SPUTUM SPECIMEN COLLECTION: CPT

## 2018-01-10 PROCEDURE — 84100 ASSAY OF PHOSPHORUS: CPT

## 2018-01-10 PROCEDURE — 94761 N-INVAS EAR/PLS OXIMETRY MLT: CPT

## 2018-01-10 PROCEDURE — 85025 COMPLETE CBC W/AUTO DIFF WBC: CPT

## 2018-01-10 PROCEDURE — 27000221 HC OXYGEN, UP TO 24 HOURS

## 2018-01-10 PROCEDURE — 63600175 PHARM REV CODE 636 W HCPCS: Performed by: PHYSICIAN ASSISTANT

## 2018-01-10 PROCEDURE — 99233 SBSQ HOSP IP/OBS HIGH 50: CPT | Mod: ,,, | Performed by: HOSPITALIST

## 2018-01-10 PROCEDURE — 99900026 HC AIRWAY MAINTENANCE (STAT)

## 2018-01-10 PROCEDURE — 36415 COLL VENOUS BLD VENIPUNCTURE: CPT

## 2018-01-10 RX ORDER — CIPROFLOXACIN 750 MG/1
750 TABLET, FILM COATED ORAL EVERY 12 HOURS
Status: COMPLETED | OUTPATIENT
Start: 2018-01-10 | End: 2018-01-14

## 2018-01-10 RX ADMIN — CIPROFLOXACIN HYDROCHLORIDE 750 MG: 750 TABLET, FILM COATED ORAL at 10:01

## 2018-01-10 RX ADMIN — MUPIROCIN: 20 OINTMENT TOPICAL at 10:01

## 2018-01-10 RX ADMIN — FAMOTIDINE 20 MG: 20 TABLET, FILM COATED ORAL at 09:01

## 2018-01-10 RX ADMIN — LEVETIRACETAM 1000 MG: 100 SOLUTION ORAL at 07:01

## 2018-01-10 RX ADMIN — ENOXAPARIN SODIUM 40 MG: 100 INJECTION SUBCUTANEOUS at 05:01

## 2018-01-10 RX ADMIN — POLYETHYLENE GLYCOL 3350 17 G: 17 POWDER, FOR SOLUTION ORAL at 10:01

## 2018-01-10 RX ADMIN — FLUOXETINE 20 MG: 20 CAPSULE ORAL at 10:01

## 2018-01-10 RX ADMIN — MUPIROCIN: 20 OINTMENT TOPICAL at 09:01

## 2018-01-10 RX ADMIN — ACETAMINOPHEN 650 MG: 650 SOLUTION ORAL at 04:01

## 2018-01-10 RX ADMIN — VITAMIN C 1 TABLET: TAB at 10:01

## 2018-01-10 RX ADMIN — BETHANECHOL CHLORIDE 10 MG: 10 TABLET ORAL at 09:01

## 2018-01-10 RX ADMIN — ASPIRIN 81 MG CHEWABLE TABLET 81 MG: 81 TABLET CHEWABLE at 10:01

## 2018-01-10 RX ADMIN — DANTROLENE SODIUM 25 MG: 25 CAPSULE ORAL at 09:01

## 2018-01-10 RX ADMIN — MINERAL OIL AND WHITE PETROLATUM: 150; 830 OINTMENT OPHTHALMIC at 09:01

## 2018-01-10 RX ADMIN — FINASTERIDE 5 MG: 5 TABLET, FILM COATED ORAL at 10:01

## 2018-01-10 RX ADMIN — BETHANECHOL CHLORIDE 10 MG: 10 TABLET ORAL at 06:01

## 2018-01-10 RX ADMIN — DONEPEZIL HYDROCHLORIDE 5 MG: 5 TABLET, FILM COATED ORAL at 10:01

## 2018-01-10 RX ADMIN — FAMOTIDINE 20 MG: 20 TABLET, FILM COATED ORAL at 10:01

## 2018-01-10 RX ADMIN — LEVETIRACETAM 1000 MG: 100 SOLUTION ORAL at 10:01

## 2018-01-10 RX ADMIN — BETHANECHOL CHLORIDE 10 MG: 10 TABLET ORAL at 02:01

## 2018-01-10 RX ADMIN — BACLOFEN 5 MG: 10 TABLET ORAL at 09:01

## 2018-01-10 RX ADMIN — BACLOFEN 5 MG: 10 TABLET ORAL at 06:01

## 2018-01-10 RX ADMIN — LEVOTHYROXINE SODIUM 75 MCG: 75 TABLET ORAL at 10:01

## 2018-01-10 RX ADMIN — ATORVASTATIN CALCIUM 40 MG: 20 TABLET, FILM COATED ORAL at 10:01

## 2018-01-10 RX ADMIN — BACLOFEN 5 MG: 10 TABLET ORAL at 02:01

## 2018-01-10 RX ADMIN — AMPICILLIN SODIUM AND SULBACTAM SODIUM 3 G: 2; 1 INJECTION, POWDER, FOR SOLUTION INTRAMUSCULAR; INTRAVENOUS at 03:01

## 2018-01-10 RX ADMIN — STANDARDIZED SENNA CONCENTRATE AND DOCUSATE SODIUM 1 TABLET: 8.6; 5 TABLET, FILM COATED ORAL at 10:01

## 2018-01-10 RX ADMIN — TAMSULOSIN HYDROCHLORIDE 0.4 MG: 0.4 CAPSULE ORAL at 09:01

## 2018-01-10 RX ADMIN — AMPICILLIN SODIUM AND SULBACTAM SODIUM 3 G: 2; 1 INJECTION, POWDER, FOR SOLUTION INTRAMUSCULAR; INTRAVENOUS at 08:01

## 2018-01-10 RX ADMIN — DANTROLENE SODIUM 25 MG: 25 CAPSULE ORAL at 02:01

## 2018-01-10 RX ADMIN — CIPROFLOXACIN HYDROCHLORIDE 750 MG: 750 TABLET, FILM COATED ORAL at 09:01

## 2018-01-10 RX ADMIN — DANTROLENE SODIUM 25 MG: 25 CAPSULE ORAL at 06:01

## 2018-01-10 RX ADMIN — TAMSULOSIN HYDROCHLORIDE 0.4 MG: 0.4 CAPSULE ORAL at 10:01

## 2018-01-10 NOTE — PROGRESS NOTES
Lying in bed with HOB elevated 30 degrees . Noted small amount of what looked as tube feeding in color noted on trach dressing and on patient contracted hand which is fixed rt under trach area. Residual check only 10 Ml retrieved . Current temp 101.1 axillary. .Tylenol suspension  650 MG given per peg. Trach dressing change . On Call Md paged and informed HOB has been elevated 30 degrees this shift

## 2018-01-10 NOTE — SUBJECTIVE & OBJECTIVE
Interval History: Please see hospital course.     Patient with new onset fever overnight up to 101.9°F with associated hypotension and tachycardia.  Good response to fluid bolus. previous urine cultures show pseudomonas sensitive to Cipro ciprofloxacin started.  There is also evidence of tube feeds surrounding trach site concerning for aspiration, chest x-ray with some opacification of right middle lobe, patient started on 7 day course of Unasyn.    Review of Systems   Unable to perform ROS: Acuity of condition     Objective:     Vital Signs (Most Recent):  Temp: 97.9 °F (36.6 °C) (01/10/18 1115)  Pulse: 97 (01/10/18 1115)  Resp: 17 (01/10/18 1115)  BP: 107/69 (01/10/18 1115)  SpO2: 98 % (01/10/18 1115) Vital Signs (24h Range):  Temp:  [97.9 °F (36.6 °C)-101.9 °F (38.8 °C)] 97.9 °F (36.6 °C)  Pulse:  [] 97  Resp:  [17-20] 17  SpO2:  [94 %-100 %] 98 %  BP: ()/(46-76) 107/69     Weight: 54.4 kg (119 lb 14.9 oz)  Body mass index is 19.96 kg/m².    Intake/Output Summary (Last 24 hours) at 01/10/18 1308  Last data filed at 01/10/18 0600   Gross per 24 hour   Intake              850 ml   Output                0 ml   Net              850 ml      Physical Exam   Constitutional: He appears cachectic. He appears ill.   HENT:   Head: Normocephalic and atraumatic.   Eyes: EOM are normal. Pupils are equal, round, and reactive to light.   Neck: Neck supple.   Cardiovascular: Normal rate and regular rhythm.    Pulmonary/Chest: Effort normal. No respiratory distress.   Trach noted    Abdominal: Soft.   PEG placed   Musculoskeletal:   B/l UE & LE contracted   Neurological:   Somnolent but does open eye to voice and stimuli.  Speech: non-verbal.  Does not follow commands.       Skin: Skin is warm and dry.   Psychiatric: Cognition and memory are impaired.       Significant Labs:   CBC:     Recent Labs  Lab 01/10/18  0506   WBC 7.67   HGB 10.0*   HCT 31.1*        CMP:     Recent Labs  Lab 01/10/18  0506   *   K  4.4      CO2 18*   *   BUN 30*   CREATININE 1.3   CALCIUM 9.0   PROT 8.0   ALBUMIN 2.4*   BILITOT 0.4   ALKPHOS 156*   AST 40   ALT 47*   ANIONGAP 8   EGFRNONAA 56.9*       Significant Imaging: I have reviewed all pertinent imaging results/findings within the past 24 hours.

## 2018-01-10 NOTE — ASSESSMENT & PLAN NOTE
- Pt w/ new onset fever midafternoon January 9.  Urine cultures from January 3 show pseudomonas sensitive to ciprofloxacin  - Also concern for aspiration with tube feeds evident at trach site, with some right middle lobe opacification on chest x-ray  - Patient started on 7 day course of ciprofloxacin and Unasyn, on January 10  - Cultures pending will follow-up

## 2018-01-10 NOTE — SUBJECTIVE & OBJECTIVE
Interval History: Please see hospital course.     Review of Systems   Unable to perform ROS: Acuity of condition     Objective:     Vital Signs (Most Recent):  Temp: (!) 101.9 °F (38.8 °C) (01/09/18 1712)  Pulse: (!) 114 (01/09/18 1611)  Resp: 20 (01/09/18 1611)  BP: 113/73 (01/09/18 1611)  SpO2: 100 % (01/09/18 1741) Vital Signs (24h Range):  Temp:  [98.7 °F (37.1 °C)-101.9 °F (38.8 °C)] 101.9 °F (38.8 °C)  Pulse:  [] 114  Resp:  [20] 20  SpO2:  [97 %-100 %] 100 %  BP: (113-137)/(67-77) 113/73     Weight: 54.4 kg (119 lb 14.9 oz)  Body mass index is 19.96 kg/m².    Intake/Output Summary (Last 24 hours) at 01/09/18 1932  Last data filed at 01/09/18 0323   Gross per 24 hour   Intake              560 ml   Output                0 ml   Net              560 ml      Physical Exam   Constitutional: He appears cachectic. He appears ill.   HENT:   Head: Normocephalic and atraumatic.   Eyes: EOM are normal. Pupils are equal, round, and reactive to light.   Neck: Neck supple.   Cardiovascular: Normal rate and regular rhythm.    Pulmonary/Chest: Effort normal. No respiratory distress.   Trach noted    Abdominal: Soft.   PEG placed   Musculoskeletal:   B/l UE & LE contracted   Neurological:   Somnolent but does open eye to voice and stimuli.  Speech: non-verbal.  Does not follow commands.       Skin: Skin is warm and dry.   Psychiatric: Cognition and memory are impaired.       Significant Labs:   CBC:   Recent Labs  Lab 01/08/18  0610   WBC 4.85   HGB 9.8*   HCT 29.4*        CMP:   Recent Labs  Lab 01/08/18  0610   *   K 4.1      CO2 21*   GLU 93   BUN 30*   CREATININE 1.0   CALCIUM 8.2*   PROT 7.8   ALBUMIN 2.4*   BILITOT 0.4   ALKPHOS 159*   AST 41*   ALT 53*   ANIONGAP 6*   EGFRNONAA >60.0       Significant Imaging: I have reviewed all pertinent imaging results/findings within the past 24 hours.

## 2018-01-10 NOTE — PROGRESS NOTES
[facility] Hospital Medicine  Progress Note    Patient Name: Enrique Yancey  MRN: [id number]  Patient Class: IP- Inpatient   Admission Date: 12/17/2017  Length of Stay: 23 days  Attending Physician: Odilon Reyes MD  Primary Care Provider: Ang Hassan MD    Hospital Medicine Team: [facility] HOSP MED 1 Phu Regan MD    Subjective:     Principal Problem:Seizure    HPI:  Enrique Yancey is a 67 y.o. male with with a medical history significant for drug-induced dermatomyositis, essential hypertension, left ICA stenosis with subsequent MCA CVA with residual right spastic hemiparesis who presented to [facility] with new onset seizure from [facility] on 12/17/2017.  He presented to outside ED with seizure activity for approximately 20 minutes. Per EMS, he was diaphoretic on arrival and tachycardic. Per nursing home, pt is nonverbal at baseline, but can focus on the speaker. He has a trach in place. He is being admitted to [facility] for a higher level of care.     Hospital Course:  12/17: Admit to [facility], EEG pending, Keppra 1 G  at OSH and 500 Q 12 started, CTH: No acute process   12/18: Increase keppra per epilepsy, sputum culture, trend procal  12/19: Treated for ESBL UTI and HAP (respiratory cultures growing Providencia/Proteus) with Zosyn for 7 days. Patient remained clinically stable during his hospital course and discharge to [facility] deferred secondary to high settings on trach collar  12/30: Stepped-down to hospital medicine 1  01/03: Awaiting placement.   01/04: No acute issues over night. Awaiting placement.   1/05: JOSÉ MIGUELON, TFs running at 40cc/hr increase to 45cc./hr, pt remains severely debilitated still pending re-evaluation by facilities that can provide for him w/ PEG & Trach, will initiate GOC discussion w/ daughter while he is inpatient, plan for discussion Sunday (1/7) morning    01/06: No acute events over night.     01/07: No acute issue over night. Goals of cared discussion with daughter tomorrow  01/08/18. Still awaiting placement that will allow pt with trach and PEG tubes.     01/08 No acute events. Daughter stopped by for goals of discussion. Would like to make patient Full code, at least until he sees his granddaughter yet to be born. Has rejections from a couple of nursing homes per SW. Stays afebrile, HD stable.     01/08: Urine cx on 01/03 with pseudomonas. Repeat ucx to r/o ESBL, f/u results. Deferred not to treat pseudomonas for now, will repeat urine culture and if persistently positive, will consider treating. Most likely colonization. Still awaiting placement.     Interval History: Please see hospital course.     Review of Systems   Unable to perform ROS: Acuity of condition     Objective:     Vital Signs (Most Recent):  Temp: (!) 101.9 °F (38.8 °C) (01/09/18 1712)  Pulse: (!) 114 (01/09/18 1611)  Resp: 20 (01/09/18 1611)  BP: 113/73 (01/09/18 1611)  SpO2: 100 % (01/09/18 1741) Vital Signs (24h Range):  Temp:  [98.7 °F (37.1 °C)-101.9 °F (38.8 °C)] 101.9 °F (38.8 °C)  Pulse:  [] 114  Resp:  [20] 20  SpO2:  [97 %-100 %] 100 %  BP: (113-137)/(67-77) 113/73     Weight: 54.4 kg (119 lb 14.9 oz)  Body mass index is 19.96 kg/m².    Intake/Output Summary (Last 24 hours) at 01/09/18 1932  Last data filed at 01/09/18 0323   Gross per 24 hour   Intake              560 ml   Output                0 ml   Net              560 ml      Physical Exam   Constitutional: He appears cachectic. He appears ill.   HENT:   Head: Normocephalic and atraumatic.   Eyes: EOM are normal. Pupils are equal, round, and reactive to light.   Neck: Neck supple.   Cardiovascular: Normal rate and regular rhythm.    Pulmonary/Chest: Effort normal. No respiratory distress.   Trach noted    Abdominal: Soft.   PEG placed   Musculoskeletal:   B/l UE & LE contracted   Neurological:   Somnolent but does open eye to voice and stimuli.  Speech: non-verbal.  Does not follow commands.       Skin: Skin is warm and dry.   Psychiatric:  Cognition and memory are impaired.       Significant Labs:   CBC:   Recent Labs  Lab 01/08/18  0610   WBC 4.85   HGB 9.8*   HCT 29.4*        CMP:   Recent Labs  Lab 01/08/18  0610   *   K 4.1      CO2 21*   GLU 93   BUN 30*   CREATININE 1.0   CALCIUM 8.2*   PROT 7.8   ALBUMIN 2.4*   BILITOT 0.4   ALKPHOS 159*   AST 41*   ALT 53*   ANIONGAP 6*   EGFRNONAA >60.0       Significant Imaging: I have reviewed all pertinent imaging results/findings within the past 24 hours.    Assessment/Plan:      * Seizure    - Patient without seizure activity since admission.  - Likely instigated by dehydration and infections (ESBL UTI and HAP - patient has completed therapy).   - Continue levetiracetam 1 g by PEG BID.      Debility      Pt remains severely debilitated still pending re-evaluation by facilities that can provide for him w/ PEG & Trach, GOC discussed with daughter who stated, would like patient full code for now.      Normocytic anemia    - Hemoglobin stable at ~10, without active bleeding.  - Likely component of anemia of chronic disease.       Benign prostatic hyperplasia with lower urinary tract symptoms    - Continue finasteride 5 mg per G tuve daily and tamsulosin 0.4 mg per G tube TID.       Hypothyroidism    - Continue levothyroxine 75 mcg per G tube daily.      Cerebrovascular accident (CVA) due to thrombosis of left carotid artery    - Prior left MCA CVA in 2016 with residual right spastic hemiparesis, purportedly s/p CEA.   - See below.       Pharyngoesophageal dysphagia    - s/p CVA.  - Tolerating TF at goal rate of 40, increase to goal of 45cc/hr       Right spastic hemiparesis    - From previous L MCA CVA.  - Continue dantrolene 25 mg per G tube TID and baclofen 5 mg per G tube TID.      Aphasia    - s/p CVA      CKD (chronic kidney disease) stage 3, GFR 30-59 ml/min    - Avoid nephrotic agents  - Renal function at baseline  - continue to monitor         VTE Risk Mitigation         Ordered      enoxaparin injection 40 mg  Every 24 hours (non-standard times)     Route:  Subcutaneous        12/17/17 1131     Medium Risk of VTE  Once      12/17/17 0449     Place sequential compression device  Until discontinued      12/17/17 0449        Phu Regan MD  Department of Hospital Medicine   [facility]

## 2018-01-10 NOTE — PROGRESS NOTES
Lying in bed. Less alert. Opening eyes occasional. Vital signs checked. BP was 71/46 . Temp 100.4 Heart rare low 100's. O2 SAT start out at 90% after 10 minutes increase to 95 % Page and discuss these with on call MD Thompson who gave order for 500 ml bolus. Md informed pt not as alert as on last night shift. .opeing eyes some b ut not tracking as well. Also Md informed at 1715 pt had temp 101.3.Md stated he will come see patient.

## 2018-01-10 NOTE — PROGRESS NOTES
[facility] Hospital Medicine  Progress Note    Patient Name: Enrique Yancey  MRN: [id number]  Patient Class: IP- Inpatient   Admission Date: 12/17/2017  Length of Stay: 24 days  Attending Physician: Odilon Reyes MD  Primary Care Provider: Ang Hassan MD    Hospital Medicine Team: [facility] HOSP MED 1 Ivonne Hess MD    Subjective:     Principal Problem:Seizure    HPI:  Enrique Yancey is a 67 y.o. male with with a medical history significant for drug-induced dermatomyositis, essential hypertension, left ICA stenosis with subsequent MCA CVA with residual right spastic hemiparesis who presented to [facility] with new onset seizure from [facility] on 12/17/2017.  He presented to outside ED with seizure activity for approximately 20 minutes. Per EMS, he was diaphoretic on arrival and tachycardic. Per nursing home, pt is nonverbal at baseline, but can focus on the speaker. He has a trach in place. He is being admitted to [facility] for a higher level of care.     Hospital Course:  12/17: Admit to [facility], EEG pending, Keppra 1 G  at OSH and 500 Q 12 started, CTH: No acute process   12/18: Increase keppra per epilepsy, sputum culture, trend procal  12/19: Treated for ESBL UTI and HAP (respiratory cultures growing Providencia/Proteus) with Zosyn for 7 days. Patient remained clinically stable during his hospital course and discharge to [facility] deferred secondary to high settings on trach collar  12/30: Stepped-down to hospital medicine 1  01/03: Awaiting placement.   01/04: No acute issues over night. Awaiting placement.   1/05: JOSÉ MIGUELON, TFs running at 40cc/hr increase to 45cc./hr, pt remains severely debilitated still pending re-evaluation by facilities that can provide for him w/ PEG & Trach, will initiate GOC discussion w/ daughter while he is inpatient, plan for discussion Sunday (1/7) morning    01/06: No acute events over night.     01/07: No acute issue over night. Goals of cared discussion with daughter tomorrow  01/08/18. Still awaiting placement that will allow pt with trach and PEG tubes.     01/08 No acute events. Daughter stopped by for goals of discussion. Would like to make patient Full code, at least until he sees his granddaughter yet to be born. Has rejections from a couple of nursing homes per SW. Stays afebrile, HD stable.     01/08: Urine cx on 01/03 with pseudomonas. Repeat ucx to r/o ESBL, f/u results. Deferred not to treat pseudomonas for now, will repeat urine culture and if persistently positive, will consider treating. Most likely colonization. Still awaiting placement.     1/10: Patient with new onset fever overnight up to 101.9°F with associated hypotension and tachycardia.  Good response to fluid bolus. previous urine cultures show pseudomonas sensitive to Cipro ciprofloxacin started.  There is also evidence of tube feeds surrounding trach site concerning for aspiration, chest x-ray with some opacification of right middle lobe, patient started on 7 day course of Unasyn. BCx, UCx pending    Interval History: Please see hospital course.     Patient with new onset fever overnight up to 101.9°F with associated hypotension and tachycardia.  Good response to fluid bolus. previous urine cultures show pseudomonas sensitive to Cipro ciprofloxacin started.  There is also evidence of tube feeds surrounding trach site concerning for aspiration, chest x-ray with some opacification of right middle lobe, patient started on 7 day course of Unasyn.    Review of Systems   Unable to perform ROS: Acuity of condition     Objective:     Vital Signs (Most Recent):  Temp: 97.9 °F (36.6 °C) (01/10/18 1115)  Pulse: 97 (01/10/18 1115)  Resp: 17 (01/10/18 1115)  BP: 107/69 (01/10/18 1115)  SpO2: 98 % (01/10/18 1115) Vital Signs (24h Range):  Temp:  [97.9 °F (36.6 °C)-101.9 °F (38.8 °C)] 97.9 °F (36.6 °C)  Pulse:  [] 97  Resp:  [17-20] 17  SpO2:  [94 %-100 %] 98 %  BP: ()/(46-76) 107/69     Weight: 54.4 kg (119 lb 14.9  oz)  Body mass index is 19.96 kg/m².    Intake/Output Summary (Last 24 hours) at 01/10/18 1308  Last data filed at 01/10/18 0600   Gross per 24 hour   Intake              850 ml   Output                0 ml   Net              850 ml      Physical Exam   Constitutional: He appears cachectic. He appears ill.   HENT:   Head: Normocephalic and atraumatic.   Eyes: EOM are normal. Pupils are equal, round, and reactive to light.   Neck: Neck supple.   Cardiovascular: Normal rate and regular rhythm.    Pulmonary/Chest: Effort normal. No respiratory distress.   Trach noted    Abdominal: Soft.   PEG placed   Musculoskeletal:   B/l UE & LE contracted   Neurological:   Somnolent but does open eye to voice and stimuli.  Speech: non-verbal.  Does not follow commands.       Skin: Skin is warm and dry.   Psychiatric: Cognition and memory are impaired.       Significant Labs:   CBC:     Recent Labs  Lab 01/10/18  0506   WBC 7.67   HGB 10.0*   HCT 31.1*        CMP:     Recent Labs  Lab 01/10/18  0506   *   K 4.4      CO2 18*   *   BUN 30*   CREATININE 1.3   CALCIUM 9.0   PROT 8.0   ALBUMIN 2.4*   BILITOT 0.4   ALKPHOS 156*   AST 40   ALT 47*   ANIONGAP 8   EGFRNONAA 56.9*       Significant Imaging: I have reviewed all pertinent imaging results/findings within the past 24 hours.    Assessment/Plan:      * Seizure    - Patient without seizure activity since admission.  - Likely instigated by dehydration and infections (ESBL UTI and HAP - patient has completed therapy).   - Continue levetiracetam 1 g by PEG BID.          Fever    - Pt w/ new onset fever midafternoon January 9.  Urine cultures from January 3 show pseudomonas sensitive to ciprofloxacin  - Also concern for aspiration with tube feeds evident at trach site, with some right middle lobe opacification on chest x-ray  - Patient started on 7 day course of ciprofloxacin and Unasyn, on January 10  - Cultures pending will follow-up        Debility      Pt  remains severely debilitated still pending re-evaluation by facilities that can provide for him w/ PEG & Trach, GOC discussed with daughter who stated, would like patient full code for now.        Normocytic anemia    - Hemoglobin stable at ~10, without active bleeding.  - Likely component of anemia of chronic disease.           Benign prostatic hyperplasia with lower urinary tract symptoms    - Continue finasteride 5 mg per G tuve daily and tamsulosin 0.4 mg per G tube TID.         Hypothyroidism    - Continue levothyroxine 75 mcg per G tube daily.            Cerebrovascular accident (CVA) due to thrombosis of left carotid artery    - Prior left MCA CVA in 2016 with residual right spastic hemiparesis, purportedly s/p CEA.   - See below.         Pharyngoesophageal dysphagia    - s/p CVA.  - Was Tolerating TF at goal rate of 45cc/hr, however rate decreased to trickle feeds on January 10 secondary to concern for aspiration with associated fever          Right spastic hemiparesis    - From previous L MCA CVA.  - Continue dantrolene 25 mg per G tube TID and baclofen 5 mg per G tube TID.          Aphasia    - s/p CVA          CKD (chronic kidney disease) stage 3, GFR 30-59 ml/min    - Avoid nephrotic agents  - Renal function at baseline  - continue to monitor         HTN (hypertension)                VTE Risk Mitigation         Ordered     enoxaparin injection 40 mg  Every 24 hours (non-standard times)     Route:  Subcutaneous        12/17/17 1131     Medium Risk of VTE  Once      12/17/17 0449     Place sequential compression device  Until discontinued      12/17/17 7769              Ivonne Hess MD  Department of Hospital Medicine   [facility]

## 2018-01-10 NOTE — PROGRESS NOTES
Tracheal suction done retrieve small amount thick white yellow secretion. Following suction O@ SATS increase to 99%

## 2018-01-10 NOTE — ASSESSMENT & PLAN NOTE
- s/p CVA.  - Was Tolerating TF at goal rate of 45cc/hr, however rate decreased to trickle feeds on January 10 secondary to concern for aspiration with associated fever

## 2018-01-11 LAB
BACTERIA UR CULT: NORMAL
POCT GLUCOSE: 104 MG/DL (ref 70–110)
POCT GLUCOSE: 106 MG/DL (ref 70–110)
POCT GLUCOSE: 108 MG/DL (ref 70–110)
POCT GLUCOSE: 97 MG/DL (ref 70–110)

## 2018-01-11 PROCEDURE — 25000003 PHARM REV CODE 250: Performed by: PSYCHIATRY & NEUROLOGY

## 2018-01-11 PROCEDURE — 25000003 PHARM REV CODE 250: Performed by: STUDENT IN AN ORGANIZED HEALTH CARE EDUCATION/TRAINING PROGRAM

## 2018-01-11 PROCEDURE — 99900026 HC AIRWAY MAINTENANCE (STAT)

## 2018-01-11 PROCEDURE — 27000221 HC OXYGEN, UP TO 24 HOURS

## 2018-01-11 PROCEDURE — 99233 SBSQ HOSP IP/OBS HIGH 50: CPT | Mod: ,,, | Performed by: HOSPITALIST

## 2018-01-11 PROCEDURE — 25000003 PHARM REV CODE 250: Performed by: PHYSICIAN ASSISTANT

## 2018-01-11 PROCEDURE — 63600175 PHARM REV CODE 636 W HCPCS: Performed by: STUDENT IN AN ORGANIZED HEALTH CARE EDUCATION/TRAINING PROGRAM

## 2018-01-11 PROCEDURE — 11000001 HC ACUTE MED/SURG PRIVATE ROOM

## 2018-01-11 PROCEDURE — 25000003 PHARM REV CODE 250: Performed by: NURSE PRACTITIONER

## 2018-01-11 PROCEDURE — 94761 N-INVAS EAR/PLS OXIMETRY MLT: CPT

## 2018-01-11 PROCEDURE — 63600175 PHARM REV CODE 636 W HCPCS: Performed by: PHYSICIAN ASSISTANT

## 2018-01-11 RX ORDER — FAMOTIDINE 20 MG/1
20 TABLET, FILM COATED ORAL DAILY
Status: DISCONTINUED | OUTPATIENT
Start: 2018-01-12 | End: 2018-01-24 | Stop reason: HOSPADM

## 2018-01-11 RX ADMIN — VITAMIN C 1 TABLET: TAB at 09:01

## 2018-01-11 RX ADMIN — LEVOTHYROXINE SODIUM 75 MCG: 75 TABLET ORAL at 09:01

## 2018-01-11 RX ADMIN — DANTROLENE SODIUM 25 MG: 25 CAPSULE ORAL at 09:01

## 2018-01-11 RX ADMIN — BACLOFEN 5 MG: 10 TABLET ORAL at 06:01

## 2018-01-11 RX ADMIN — ENOXAPARIN SODIUM 40 MG: 100 INJECTION SUBCUTANEOUS at 05:01

## 2018-01-11 RX ADMIN — FLUOXETINE 20 MG: 20 CAPSULE ORAL at 09:01

## 2018-01-11 RX ADMIN — TAMSULOSIN HYDROCHLORIDE 0.4 MG: 0.4 CAPSULE ORAL at 09:01

## 2018-01-11 RX ADMIN — FAMOTIDINE 20 MG: 20 TABLET, FILM COATED ORAL at 09:01

## 2018-01-11 RX ADMIN — DANTROLENE SODIUM 25 MG: 25 CAPSULE ORAL at 01:01

## 2018-01-11 RX ADMIN — AMPICILLIN SODIUM AND SULBACTAM SODIUM 3 G: 2; 1 INJECTION, POWDER, FOR SOLUTION INTRAMUSCULAR; INTRAVENOUS at 04:01

## 2018-01-11 RX ADMIN — ASPIRIN 81 MG CHEWABLE TABLET 81 MG: 81 TABLET CHEWABLE at 09:01

## 2018-01-11 RX ADMIN — ATORVASTATIN CALCIUM 40 MG: 20 TABLET, FILM COATED ORAL at 09:01

## 2018-01-11 RX ADMIN — STANDARDIZED SENNA CONCENTRATE AND DOCUSATE SODIUM 1 TABLET: 8.6; 5 TABLET, FILM COATED ORAL at 09:01

## 2018-01-11 RX ADMIN — CIPROFLOXACIN HYDROCHLORIDE 750 MG: 750 TABLET, FILM COATED ORAL at 09:01

## 2018-01-11 RX ADMIN — LEVETIRACETAM 1000 MG: 100 SOLUTION ORAL at 09:01

## 2018-01-11 RX ADMIN — AMPICILLIN SODIUM AND SULBACTAM SODIUM 3 G: 2; 1 INJECTION, POWDER, FOR SOLUTION INTRAMUSCULAR; INTRAVENOUS at 12:01

## 2018-01-11 RX ADMIN — BACLOFEN 5 MG: 10 TABLET ORAL at 01:01

## 2018-01-11 RX ADMIN — BETHANECHOL CHLORIDE 10 MG: 10 TABLET ORAL at 06:01

## 2018-01-11 RX ADMIN — FINASTERIDE 5 MG: 5 TABLET, FILM COATED ORAL at 09:01

## 2018-01-11 RX ADMIN — POLYETHYLENE GLYCOL 3350 17 G: 17 POWDER, FOR SOLUTION ORAL at 09:01

## 2018-01-11 RX ADMIN — BETHANECHOL CHLORIDE 10 MG: 10 TABLET ORAL at 09:01

## 2018-01-11 RX ADMIN — BETHANECHOL CHLORIDE 10 MG: 10 TABLET ORAL at 02:01

## 2018-01-11 RX ADMIN — DANTROLENE SODIUM 25 MG: 25 CAPSULE ORAL at 06:01

## 2018-01-11 RX ADMIN — DONEPEZIL HYDROCHLORIDE 5 MG: 5 TABLET, FILM COATED ORAL at 09:01

## 2018-01-11 RX ADMIN — MUPIROCIN: 20 OINTMENT TOPICAL at 09:01

## 2018-01-11 RX ADMIN — AMPICILLIN SODIUM AND SULBACTAM SODIUM 3 G: 2; 1 INJECTION, POWDER, FOR SOLUTION INTRAMUSCULAR; INTRAVENOUS at 08:01

## 2018-01-11 RX ADMIN — AMPICILLIN SODIUM AND SULBACTAM SODIUM 3 G: 2; 1 INJECTION, POWDER, FOR SOLUTION INTRAMUSCULAR; INTRAVENOUS at 11:01

## 2018-01-11 RX ADMIN — BACLOFEN 5 MG: 10 TABLET ORAL at 09:01

## 2018-01-11 NOTE — PROGRESS NOTES
Pharmacist Renal Dose Adjustment Note    Enrique Yancey is a 67 y.o. male being treated with the medication famotidine.    Patient Data:    Vital Signs (Most Recent):  Temp: 99.1 °F (37.3 °C) (01/11/18 1129)  Pulse: 96 (01/11/18 1354)  Resp: 18 (01/11/18 1354)  BP: 111/71 (01/11/18 1129)  SpO2: 97 % (01/11/18 1354) Vital Signs (72h Range):  Temp:  [97.4 °F (36.3 °C)-101.9 °F (38.8 °C)]   Pulse:  []   Resp:  [14-20]   BP: ()/(46-77)   SpO2:  [93 %-100 %]        Recent Labs     Lab 01/06/18  0435 01/08/18  0610 01/10/18  0506   CREATININE 1.0 1.0 1.3     Serum creatinine: 1.3 mg/dL 01/10/18 0506  Estimated creatinine clearance: 43 mL/min    Medication:Famotidine 20 mg per G tube BID will be changed to medication:Famotidine 20 mg per G tube daily    Pharmacist's Name: Jing Magaña  Pharmacist's Extension: [phone]

## 2018-01-11 NOTE — NURSING
Positive blood culture (gram + cocci resembling staph) result in aerobic bottle reported to Dr. Paiz. No new orders received. Will continue to monitor closely.

## 2018-01-11 NOTE — ASSESSMENT & PLAN NOTE
- Pt w/ new onset fever midafternoon January 9.  Urine cultures from January 3 show pseudomonas sensitive to ciprofloxacin  - Also concern for aspiration with tube feeds evident at trach site, with some right middle lobe opacification on chest x-ray  - Patient started on 7 day course of ciprofloxacin and Unasyn, on January 10  - Repeat UCx no growth.

## 2018-01-11 NOTE — PROGRESS NOTES
[facility] Hospital Medicine  Progress Note    Patient Name: Enrique Yancey  MRN: [id number]  Patient Class: IP- Inpatient   Admission Date: 12/17/2017  Length of Stay: 25 days  Attending Physician: Odilon Reyes MD  Primary Care Provider: Ang Hassan MD    Hospital Medicine Team: [facility] 1 Phu Regan MD    Subjective:     Principal Problem:Seizure    HPI:  Enrique Yancey is a 67 y.o. male with with a medical history significant for drug-induced dermatomyositis, essential hypertension, left ICA stenosis with subsequent MCA CVA with residual right spastic hemiparesis who [place] [facility] [place] new onset seizure from [facility] on 12/17/2017.  He presented to outside ED with seizure activity for approximately 20 minutes. Per EMS, he was diaphoretic on arrival and tachycardic. Per nursing home, pt is nonverbal at baseline, but can focus on the speaker. He has a trach in place. He is being admitted to [facility] for a higher level of care.     Hospital Course:  12/17: Admit to [facility], EEG pending, Keppra 1 G  at OSH and 500 Q 12 started, CTH: No acute process   12/18: Increase keppra per epilepsy, sputum culture, trend procal  12/19: Treated for ESBL UTI and HAP (respiratory cultures growing Providencia/Proteus) with Zosyn for 7 days. Patient remained clinically stable during his hospital course and discharge to [facility] deferred secondary to high settings on trach collar  12/30: Stepped-down to hospital medicine 1  01/03: Awaiting placement.   01/04: No acute issues over night. Awaiting placement.   1/05: JOSÉ MIGUELON, TFs running at 40cc/hr increase to 45cc./hr, pt remains severely debilitated still pending re-evaluation by facilities that can provide for him w/ PEG & Trach, will initiate GOC discussion w/ daughter while he is inpatient, plan for discussion Sunday (1/7) morning    01/06: No acute events over night.     01/07: No acute issue over night. Goals of cared discussion with daughter tomorrow  01/08/18. Still awaiting placement that will allow pt with trach and PEG tubes.     01/08 No acute events. Daughter stopped by for goals of discussion. Would like to make patient Full code, at least until he sees his granddaughter yet to be born. Has rejections from a couple of nursing homes per SW. Stays afebrile, HD stable.     01/08: Urine cx on 01/03 with pseudomonas. Repeat ucx to r/o ESBL, f/u results. Deferred not to treat pseudomonas for now, will repeat urine culture and if persistently positive, will consider treating. Most likely colonization. Still awaiting placement.     1/10: Patient with new onset fever overnight up to 101.9°F with associated hypotension and tachycardia.  Good response to fluid bolus. previous urine cultures show pseudomonas sensitive to Cipro ciprofloxacin started.  There is also evidence of tube feeds surrounding trach site concerning for aspiration, chest x-ray with some opacification of right middle lobe, patient started on 7 day course of Unasyn. BCx, UCx pending    01/11: Patient HD stable. Repeat UCx, no growth. Will remove contact precaution as pt has two negative cx for ESBL. Previous UCx with pseudomonas, on Cipro. On Unasyn for possible Aspiration PNA. He is otherwise stable. Plan to wean oxygen to 3L: Nursing home will not accept if oxygen requirement is above 3L.     Interval History: Please see hospital course.     Review of Systems   Unable to perform ROS: Acuity of condition     Objective:     Vital Signs (Most Recent):  Temp: 99.1 °F (37.3 °C) (01/11/18 1129)  Pulse: 79 (01/11/18 1129)  Resp: 16 (01/11/18 1129)  BP: 111/71 (01/11/18 1129)  SpO2: 96 % (01/11/18 1129) Vital Signs (24h Range):  Temp:  [97.4 °F (36.3 °C)-99.1 °F (37.3 °C)] 99.1 °F (37.3 °C)  Pulse:  [79-95] 79  Resp:  [14-20] 16  SpO2:  [93 %-99 %] 96 %  BP: (102-118)/(65-71) 111/71     Weight: 54.4 kg (119 lb 14.9 oz)  Body mass index is 19.96 kg/m².    Intake/Output Summary (Last 24 hours) at 01/11/18  1210  Last data filed at 01/11/18 0815   Gross per 24 hour   Intake              200 ml   Output                0 ml   Net              200 ml      Physical Exam   Constitutional: He appears cachectic. He appears ill.   HENT:   Head: Normocephalic and atraumatic.   Eyes: EOM are normal. Pupils are equal, round, and reactive to light.   Neck: Neck supple.   Cardiovascular: Normal rate and regular rhythm.    Pulmonary/Chest: Effort normal. No respiratory distress.   Trach noted    Abdominal: Soft.   PEG placed   Musculoskeletal:   B/l UE & LE contracted   Neurological:   Somnolent but does open eye to voice and stimuli.  Speech: non-verbal.  Does not follow commands.       Skin: Skin is warm and dry.   Psychiatric: Cognition and memory are impaired.       Significant Labs:   CBC:   Recent Labs  Lab 01/10/18  0506   WBC 7.67   HGB 10.0*   HCT 31.1*        CMP:   Recent Labs  Lab 01/10/18  0506   *   K 4.4      CO2 18*   *   BUN 30*   CREATININE 1.3   CALCIUM 9.0   PROT 8.0   ALBUMIN 2.4*   BILITOT 0.4   ALKPHOS 156*   AST 40   ALT 47*   ANIONGAP 8   EGFRNONAA 56.9*       Significant Imaging: I have reviewed all pertinent imaging results/findings within the past 24 hours.    Assessment/Plan:      * Seizure    - Patient without seizure activity since admission.  - Likely instigated by dehydration and infections (ESBL UTI and HAP - patient has completed therapy).   - Continue levetiracetam 1 g by PEG BID.      Fever    - Pt w/ new onset fever midafternoon January 9.  Urine cultures from January 3 show pseudomonas sensitive to ciprofloxacin  - Also concern for aspiration with tube feeds evident at [facility] site, with some right middle lobe opacification on chest x-ray  - Patient started on 7 day course of ciprofloxacin and Unasyn, on January 10  - Repeat UCx no growth.       Debility      Pt remains severely debilitated still pending re-evaluation by facilities that can provide for him w/ PEG &  Trach, GOC discussed with daughter who stated, would like patient full code for now.      Normocytic anemia    - Hemoglobin stable at ~10, without active bleeding.  - Likely component of anemia of chronic disease.       Benign prostatic hyperplasia with lower urinary tract symptoms    - Continue finasteride 5 mg per G tuve daily and tamsulosin 0.4 mg per G tube TID.       Hypothyroidism    - Continue levothyroxine 75 mcg per G tube daily.      Cerebrovascular accident (CVA) due to thrombosis of left carotid artery    - Prior left MCA CVA in 2016 with residual right spastic hemiparesis, purportedly s/p CEA.   - See below.       Pharyngoesophageal dysphagia    - s/p CVA.  - Was Tolerating TF at goal rate of 45cc/hr, however rate decreased to trickle feeds on January 10 secondary to concern for aspiration with associated fever      Right spastic hemiparesis    - From previous L MCA CVA.  - Continue dantrolene 25 mg per G tube TID and baclofen 5 mg per G tube TID.      Aphasia    - s/p CVA      CKD (chronic kidney disease) stage 3, GFR 30-59 ml/min    - Avoid nephrotic agents  - Renal function at baseline  - continue to monitor         VTE Risk Mitigation         Ordered     enoxaparin injection 40 mg  Every 24 hours (non-standard times)     Route:  Subcutaneous        12/17/17 1131     Medium Risk of VTE  Once      12/17/17 0449     Place sequential compression device  Until discontinued      12/17/17 2097          Phu Regan MD  Department of Hospital Medicine   [facility]

## 2018-01-11 NOTE — SUBJECTIVE & OBJECTIVE
Interval History: Please see hospital course.     Review of Systems   Unable to perform ROS: Acuity of condition     Objective:     Vital Signs (Most Recent):  Temp: 99.1 °F (37.3 °C) (01/11/18 1129)  Pulse: 79 (01/11/18 1129)  Resp: 16 (01/11/18 1129)  BP: 111/71 (01/11/18 1129)  SpO2: 96 % (01/11/18 1129) Vital Signs (24h Range):  Temp:  [97.4 °F (36.3 °C)-99.1 °F (37.3 °C)] 99.1 °F (37.3 °C)  Pulse:  [79-95] 79  Resp:  [14-20] 16  SpO2:  [93 %-99 %] 96 %  BP: (102-118)/(65-71) 111/71     Weight: 54.4 kg (119 lb 14.9 oz)  Body mass index is 19.96 kg/m².    Intake/Output Summary (Last 24 hours) at 01/11/18 1210  Last data filed at 01/11/18 0815   Gross per 24 hour   Intake              200 ml   Output                0 ml   Net              200 ml      Physical Exam   Constitutional: He appears cachectic. He appears ill.   HENT:   Head: Normocephalic and atraumatic.   Eyes: EOM are normal. Pupils are equal, round, and reactive to light.   Neck: Neck supple.   Cardiovascular: Normal rate and regular rhythm.    Pulmonary/Chest: Effort normal. No respiratory distress.   Trach noted    Abdominal: Soft.   PEG placed   Musculoskeletal:   B/l UE & LE contracted   Neurological:   Somnolent but does open eye to voice and stimuli.  Speech: non-verbal.  Does not follow commands.       Skin: Skin is warm and dry.   Psychiatric: Cognition and memory are impaired.       Significant Labs:   CBC:   Recent Labs  Lab 01/10/18  0506   WBC 7.67   HGB 10.0*   HCT 31.1*        CMP:   Recent Labs  Lab 01/10/18  0506   *   K 4.4      CO2 18*   *   BUN 30*   CREATININE 1.3   CALCIUM 9.0   PROT 8.0   ALBUMIN 2.4*   BILITOT 0.4   ALKPHOS 156*   AST 40   ALT 47*   ANIONGAP 8   EGFRNONAA 56.9*       Significant Imaging: I have reviewed all pertinent imaging results/findings within the past 24 hours.

## 2018-01-12 PROBLEM — R50.9 FEVER: Status: RESOLVED | Noted: 2018-01-10 | Resolved: 2018-01-12

## 2018-01-12 LAB
ALBUMIN SERPL BCP-MCNC: 2.3 G/DL
ALP SERPL-CCNC: 132 U/L
ALT SERPL W/O P-5'-P-CCNC: 42 U/L
ANION GAP SERPL CALC-SCNC: 8 MMOL/L
AST SERPL-CCNC: 35 U/L
BASOPHILS # BLD AUTO: 0.01 K/UL
BASOPHILS NFR BLD: 0.2 %
BILIRUB SERPL-MCNC: 0.3 MG/DL
BUN SERPL-MCNC: 27 MG/DL
CALCIUM SERPL-MCNC: 8.9 MG/DL
CHLORIDE SERPL-SCNC: 108 MMOL/L
CO2 SERPL-SCNC: 20 MMOL/L
CREAT SERPL-MCNC: 1.1 MG/DL
DIFFERENTIAL METHOD: ABNORMAL
EOSINOPHIL # BLD AUTO: 0.6 K/UL
EOSINOPHIL NFR BLD: 10.2 %
ERYTHROCYTE [DISTWIDTH] IN BLOOD BY AUTOMATED COUNT: 16 %
EST. GFR  (AFRICAN AMERICAN): >60 ML/MIN/1.73 M^2
EST. GFR  (NON AFRICAN AMERICAN): >60 ML/MIN/1.73 M^2
GLUCOSE SERPL-MCNC: 93 MG/DL
HCT VFR BLD AUTO: 28.5 %
HGB BLD-MCNC: 9.5 G/DL
IMM GRANULOCYTES # BLD AUTO: 0.03 K/UL
IMM GRANULOCYTES NFR BLD AUTO: 0.5 %
LYMPHOCYTES # BLD AUTO: 0.7 K/UL
LYMPHOCYTES NFR BLD: 10.8 %
MAGNESIUM SERPL-MCNC: 1.9 MG/DL
MCH RBC QN AUTO: 31.3 PG
MCHC RBC AUTO-ENTMCNC: 33.3 G/DL
MCV RBC AUTO: 94 FL
MONOCYTES # BLD AUTO: 0.7 K/UL
MONOCYTES NFR BLD: 11.8 %
NEUTROPHILS # BLD AUTO: 4 K/UL
NEUTROPHILS NFR BLD: 66.5 %
NRBC BLD-RTO: 0 /100 WBC
PHOSPHATE SERPL-MCNC: 3 MG/DL
PLATELET # BLD AUTO: 171 K/UL
PMV BLD AUTO: 11.9 FL
POCT GLUCOSE: 100 MG/DL (ref 70–110)
POCT GLUCOSE: 108 MG/DL (ref 70–110)
POCT GLUCOSE: 94 MG/DL (ref 70–110)
POTASSIUM SERPL-SCNC: 3.6 MMOL/L
PROT SERPL-MCNC: 7.6 G/DL
RBC # BLD AUTO: 3.04 M/UL
SODIUM SERPL-SCNC: 136 MMOL/L
WBC # BLD AUTO: 6 K/UL

## 2018-01-12 PROCEDURE — 25000003 PHARM REV CODE 250: Performed by: PHYSICIAN ASSISTANT

## 2018-01-12 PROCEDURE — 80053 COMPREHEN METABOLIC PANEL: CPT

## 2018-01-12 PROCEDURE — 25000003 PHARM REV CODE 250: Performed by: PSYCHIATRY & NEUROLOGY

## 2018-01-12 PROCEDURE — 25000003 PHARM REV CODE 250: Performed by: STUDENT IN AN ORGANIZED HEALTH CARE EDUCATION/TRAINING PROGRAM

## 2018-01-12 PROCEDURE — 99233 SBSQ HOSP IP/OBS HIGH 50: CPT | Mod: ,,, | Performed by: HOSPITALIST

## 2018-01-12 PROCEDURE — 84100 ASSAY OF PHOSPHORUS: CPT

## 2018-01-12 PROCEDURE — 63600175 PHARM REV CODE 636 W HCPCS: Performed by: STUDENT IN AN ORGANIZED HEALTH CARE EDUCATION/TRAINING PROGRAM

## 2018-01-12 PROCEDURE — 11000001 HC ACUTE MED/SURG PRIVATE ROOM

## 2018-01-12 PROCEDURE — 63600175 PHARM REV CODE 636 W HCPCS: Performed by: PHYSICIAN ASSISTANT

## 2018-01-12 PROCEDURE — 85025 COMPLETE CBC W/AUTO DIFF WBC: CPT

## 2018-01-12 PROCEDURE — 99900026 HC AIRWAY MAINTENANCE (STAT)

## 2018-01-12 PROCEDURE — 94761 N-INVAS EAR/PLS OXIMETRY MLT: CPT

## 2018-01-12 PROCEDURE — 25000003 PHARM REV CODE 250: Performed by: HOSPITALIST

## 2018-01-12 PROCEDURE — 36415 COLL VENOUS BLD VENIPUNCTURE: CPT

## 2018-01-12 PROCEDURE — 97803 MED NUTRITION INDIV SUBSEQ: CPT

## 2018-01-12 PROCEDURE — 25000003 PHARM REV CODE 250: Performed by: NURSE PRACTITIONER

## 2018-01-12 PROCEDURE — 83735 ASSAY OF MAGNESIUM: CPT

## 2018-01-12 RX ADMIN — POLYETHYLENE GLYCOL 3350 17 G: 17 POWDER, FOR SOLUTION ORAL at 09:01

## 2018-01-12 RX ADMIN — TAMSULOSIN HYDROCHLORIDE 0.4 MG: 0.4 CAPSULE ORAL at 09:01

## 2018-01-12 RX ADMIN — BETHANECHOL CHLORIDE 10 MG: 10 TABLET ORAL at 02:01

## 2018-01-12 RX ADMIN — ATORVASTATIN CALCIUM 40 MG: 20 TABLET, FILM COATED ORAL at 09:01

## 2018-01-12 RX ADMIN — DANTROLENE SODIUM 25 MG: 25 CAPSULE ORAL at 02:01

## 2018-01-12 RX ADMIN — FLUOXETINE 20 MG: 20 CAPSULE ORAL at 09:01

## 2018-01-12 RX ADMIN — DANTROLENE SODIUM 25 MG: 25 CAPSULE ORAL at 05:01

## 2018-01-12 RX ADMIN — LEVOTHYROXINE SODIUM 75 MCG: 75 TABLET ORAL at 09:01

## 2018-01-12 RX ADMIN — VITAMIN C 1 TABLET: TAB at 09:01

## 2018-01-12 RX ADMIN — DANTROLENE SODIUM 25 MG: 25 CAPSULE ORAL at 10:01

## 2018-01-12 RX ADMIN — MUPIROCIN: 20 OINTMENT TOPICAL at 09:01

## 2018-01-12 RX ADMIN — LEVETIRACETAM 1000 MG: 100 SOLUTION ORAL at 09:01

## 2018-01-12 RX ADMIN — MINERAL OIL AND WHITE PETROLATUM: 150; 830 OINTMENT OPHTHALMIC at 09:01

## 2018-01-12 RX ADMIN — BETHANECHOL CHLORIDE 10 MG: 10 TABLET ORAL at 05:01

## 2018-01-12 RX ADMIN — AMPICILLIN SODIUM AND SULBACTAM SODIUM 3 G: 2; 1 INJECTION, POWDER, FOR SOLUTION INTRAMUSCULAR; INTRAVENOUS at 10:01

## 2018-01-12 RX ADMIN — DONEPEZIL HYDROCHLORIDE 5 MG: 5 TABLET, FILM COATED ORAL at 09:01

## 2018-01-12 RX ADMIN — STANDARDIZED SENNA CONCENTRATE AND DOCUSATE SODIUM 1 TABLET: 8.6; 5 TABLET, FILM COATED ORAL at 09:01

## 2018-01-12 RX ADMIN — AMPICILLIN SODIUM AND SULBACTAM SODIUM 3 G: 2; 1 INJECTION, POWDER, FOR SOLUTION INTRAMUSCULAR; INTRAVENOUS at 03:01

## 2018-01-12 RX ADMIN — FAMOTIDINE 20 MG: 20 TABLET, FILM COATED ORAL at 09:01

## 2018-01-12 RX ADMIN — ENOXAPARIN SODIUM 40 MG: 100 INJECTION SUBCUTANEOUS at 05:01

## 2018-01-12 RX ADMIN — AMPICILLIN SODIUM AND SULBACTAM SODIUM 3 G: 2; 1 INJECTION, POWDER, FOR SOLUTION INTRAMUSCULAR; INTRAVENOUS at 07:01

## 2018-01-12 RX ADMIN — CIPROFLOXACIN HYDROCHLORIDE 750 MG: 750 TABLET, FILM COATED ORAL at 09:01

## 2018-01-12 RX ADMIN — BACLOFEN 5 MG: 10 TABLET ORAL at 10:01

## 2018-01-12 RX ADMIN — BACLOFEN 5 MG: 10 TABLET ORAL at 05:01

## 2018-01-12 RX ADMIN — BACLOFEN 5 MG: 10 TABLET ORAL at 02:01

## 2018-01-12 RX ADMIN — ASPIRIN 81 MG CHEWABLE TABLET 81 MG: 81 TABLET CHEWABLE at 09:01

## 2018-01-12 RX ADMIN — FINASTERIDE 5 MG: 5 TABLET, FILM COATED ORAL at 09:01

## 2018-01-12 RX ADMIN — BETHANECHOL CHLORIDE 10 MG: 10 TABLET ORAL at 10:01

## 2018-01-12 NOTE — PROGRESS NOTES
Pt has not had trach collar on due to pt being contracted and it keeps falling/coming off. Pt has had no supplemental oxygen and sats 97% ra. Pts sats were monitored while this nurse and tech gave him a bath and t/p him. No secretions in or around inner cannula.

## 2018-01-12 NOTE — PLAN OF CARE
Problem: Patient Care Overview  Goal: Plan of Care Review  Outcome: Ongoing (interventions implemented as appropriate)   01/12/18 0632   Coping/Psychosocial   Plan Of Care Reviewed With other (see comments)  (RN)   Recommendations     Recommendation/Intervention: Pt with 50lb weight loss over past 11 months. Pt with generalized wasting. Recommend increasing TF. Isosource 1.5 @ 45mL/hr. Hold for residuals >500mL. RD to monitor.  Goals: Meet % EEN, EPN  Nutrition Goal Status: goal met  Communication of RD Recs: reviewed with RN  Assessment and Plan         Pharyngoesophageal dysphagia     Nutrition Problem  Inadequate oral intake     Related to (etiology):   dysphagia     Signs and Symptoms (as evidenced by):   Pt NPO w/ trach. TF running at goal  Rate via PEG     Interventions/Recommendations (treatment strategy):  See recs above     Nutrition Diagnosis Status:   Continues

## 2018-01-12 NOTE — PLAN OF CARE
Problem: Patient Care Overview  Goal: Plan of Care Review  Outcome: Ongoing (interventions implemented as appropriate)  Patient arouses to voice only and is globally aphasic.Earlier in shift patient had 120cc residual and MD notified and he decreased rate to 20cc/hr, no further residual aafter that. Tolerating trache collar at 21% No signs of pain, mepilex intact B hips. -97. Daughter in to visit and POC reviewed with her. Her questions were answered and concerns addressed. Isolation discontinued per order. In no acute distress, will continue to monitor.

## 2018-01-12 NOTE — PLAN OF CARE
Problem: Patient Care Overview  Goal: Plan of Care Review  Outcome: Ongoing (interventions implemented as appropriate)  Pt free from falls. Pt wears non slip socks when ambulating. Pt bed low and locked position. Pt afebrile. Pt IV site without redness or edema. Pt has denied any pain or discomfort this shift.

## 2018-01-12 NOTE — PROGRESS NOTES
[facility]  Adult Nutrition  Progress Note    SUMMARY     Recommendations    Recommendation/Intervention: Pt with 50lb weight loss over past 11 months. Pt with generalized wasting. Recommend increasing TF. Isosource 1.5 @ 45mL/hr. Hold for residuals >500mL. RD to monitor.  Goals: Meet % EEN, EPN  Nutrition Goal Status: goal met  Communication of RD Recs: reviewed with RN    Reason for Assessment    Reason for Assessment: RD follow-up  Diagnosis: other (see comments) (Seizure)  Relevant Medical History: HTN, PEG placement (12/2016)   Interdisciplinary Rounds: did not attend  General Information Comments: Pt remains on trach collar. Pt nonverbal, does not follow commands. Per chart review, TF isosource 1.5 running at goal rate of 30ml/hr. RN reports no complication/ residuals  Nutrition Discharge Planning: optimal nutrition via PEG with tolerance    Nutrition Prescription Ordered    Current Diet Order: NPO  Nutrition Order Comments: TF at goal  Current Nutrition Support Formula Ordered: Isosource 1.5  Current Nutrition Support Rate Ordered: 30 (ml)  Current Nutrition Support Frequency Ordered: mL/hr    Evaluation of Received Nutrients/Fluid Intake    Enteral Calories (kcal): 1080  Enteral Protein (gm): 49  Enteral (Free Water) Fluid (mL): 550  Free Water Flush Fluid (mL): 800   Energy Calories Required: meeting needs  % Kcal Needs: 85   Protein Required: meeting needs  % Protein Needs: 93  IV Fluid (mL): 0   I/O: 12.3 L Since admit     Intake/Output Summary (Last 24 hours) at 01/12/18 0630  Last data filed at 01/12/18 0600   Gross per 24 hour   Intake              950 ml   Output                0 ml   Net              950 ml   Fluid Required: meeting needs  Comments: LBM: 1/10/18  Tolerance: tolerating  % Intake of Estimated Energy Needs: 50 - 75 %  % Meal Intake: 100%     Nutrition Risk Screen     Nutrition Risk Screen: tube feeding or parenteral nutrition, other (see comments)  "(trach)    Nutrition/Diet History    Patient Reported Diet/Restrictions/Preferences: other (see comments) (SHELLY)  Typical Food/Fluid Intake: TF at goal, tolerating appropriately per nsg.  Food Preferences: SHELLY Yazidi/cultural food preferences.   Factors Affecting Nutritional Intake: impaired cognitive status/motor control, NPO, chewing difficulties/inability to chew food, difficulty/impaired swallowing     Labs/Tests/Procedures/Meds       Pertinent Labs Reviewed: reviewed, pertinent  Pertinent Labs Comments: Na-132, Co2-18, BUN-30, Glu-129 AlkPO-156, ALT-47  Pertinent Medications Reviewed: reviewed, pertinent  Pertinent Medications Comments: statin, b-complex, cipro,  insulin (PRN)    Physical Findings    Overall Physical Appearance: generalized wasting, other (see comments) (trach collar)  Tubes: gastrostomy tube  Oral/Mouth Cavity: WDL  Skin: intact    Anthropometrics    Temp: 98.8 °F (37.1 °C)  Height: 5' 5" (165.1 cm)  Weight Method: Bed Scale  Weight: 54 kg (119 lb 0.8 oz)  Ideal Body Weight (IBW), Male: 136 lb  % Ideal Body Weight, Male (lb): 88.18 lb  BMI (Calculated): 20  BMI Grade: 18.5-24.9 - normal  Usual Body Weight (UBW), k.6 kg  % Usual Body Weight: 70.25  % Weight Change From Usual Weight: -29.9 %     Estimated/Assessed Needs    Weight Used For Calorie Calculations: 54.4 kg (119 lb 14.9 oz)   Energy Calorie Requirements (kcal): 1563 kcal/d  Energy Need Method: [place]-St Jeor (1.25 PAL)  RMR ([place]-St. Jeor Equation): 1250.88   Weight Used For Protein Calculations: 54.4 kg (119 lb 14.9 oz)  Protein Requirements: 55-65 g/d (1-1.2  g/kg)  Fluid Need Method: RDA Method, other (see comments) (Per MD or 1 mL/kcal)   RDA Method (mL): 1563     Assessment and Plan    Pharyngoesophageal dysphagia    Nutrition Problem  Inadequate oral intake    Related to (etiology):   dysphagia    Signs and Symptoms (as evidenced by):   Pt NPO w/ trach. TF running at goal  Rate via PEG    Interventions/Recommendations " (treatment strategy):  See recs above    Nutrition Diagnosis Status:   Continues                  Monitor and Evaluation    Food and Nutrient Intake: enteral nutrition intake  Food and Nutrient Adminstration: enteral and parenteral nutrition administration  Physical Activity and Function: nutrition-related ADLs and IADLs  Anthropometric Measurements: weight, weight change  Biochemical Data, Medical Tests and Procedures: lipid profile, inflammatory profile, glucose/endocrine profile, gastrointestinal profile, electrolyte and renal panel  Nutrition-Focused Physical Findings: overall appearance    Nutrition Risk    Level of Risk: other (see comments) (f/u 1x/week)    Nutrition Follow-Up    RD Follow-up?: Yes

## 2018-01-13 LAB
BACTERIA BLD CULT: NORMAL
POCT GLUCOSE: 107 MG/DL (ref 70–110)
POCT GLUCOSE: 109 MG/DL (ref 70–110)
POCT GLUCOSE: 113 MG/DL (ref 70–110)
POCT GLUCOSE: 114 MG/DL (ref 70–110)
POCT GLUCOSE: 127 MG/DL (ref 70–110)
POCT GLUCOSE: 142 MG/DL (ref 70–110)
POCT GLUCOSE: 98 MG/DL (ref 70–110)

## 2018-01-13 PROCEDURE — 25000003 PHARM REV CODE 250: Performed by: PSYCHIATRY & NEUROLOGY

## 2018-01-13 PROCEDURE — 25000003 PHARM REV CODE 250: Performed by: PHYSICIAN ASSISTANT

## 2018-01-13 PROCEDURE — 94761 N-INVAS EAR/PLS OXIMETRY MLT: CPT

## 2018-01-13 PROCEDURE — 25000003 PHARM REV CODE 250: Performed by: STUDENT IN AN ORGANIZED HEALTH CARE EDUCATION/TRAINING PROGRAM

## 2018-01-13 PROCEDURE — 25000003 PHARM REV CODE 250: Performed by: HOSPITALIST

## 2018-01-13 PROCEDURE — 99233 SBSQ HOSP IP/OBS HIGH 50: CPT | Mod: ,,, | Performed by: HOSPITALIST

## 2018-01-13 PROCEDURE — 25000003 PHARM REV CODE 250: Performed by: NURSE PRACTITIONER

## 2018-01-13 PROCEDURE — 99900026 HC AIRWAY MAINTENANCE (STAT)

## 2018-01-13 PROCEDURE — 11000001 HC ACUTE MED/SURG PRIVATE ROOM

## 2018-01-13 PROCEDURE — 63600175 PHARM REV CODE 636 W HCPCS: Performed by: PHYSICIAN ASSISTANT

## 2018-01-13 PROCEDURE — 63600175 PHARM REV CODE 636 W HCPCS: Performed by: STUDENT IN AN ORGANIZED HEALTH CARE EDUCATION/TRAINING PROGRAM

## 2018-01-13 RX ADMIN — LEVETIRACETAM 1000 MG: 100 SOLUTION ORAL at 09:01

## 2018-01-13 RX ADMIN — STANDARDIZED SENNA CONCENTRATE AND DOCUSATE SODIUM 1 TABLET: 8.6; 5 TABLET, FILM COATED ORAL at 10:01

## 2018-01-13 RX ADMIN — BETHANECHOL CHLORIDE 10 MG: 10 TABLET ORAL at 02:01

## 2018-01-13 RX ADMIN — BETHANECHOL CHLORIDE 10 MG: 10 TABLET ORAL at 05:01

## 2018-01-13 RX ADMIN — FAMOTIDINE 20 MG: 20 TABLET, FILM COATED ORAL at 10:01

## 2018-01-13 RX ADMIN — TAMSULOSIN HYDROCHLORIDE 0.4 MG: 0.4 CAPSULE ORAL at 09:01

## 2018-01-13 RX ADMIN — BACLOFEN 5 MG: 10 TABLET ORAL at 05:01

## 2018-01-13 RX ADMIN — MINERAL OIL AND WHITE PETROLATUM: 150; 830 OINTMENT OPHTHALMIC at 09:01

## 2018-01-13 RX ADMIN — ENOXAPARIN SODIUM 40 MG: 100 INJECTION SUBCUTANEOUS at 04:01

## 2018-01-13 RX ADMIN — FLUOXETINE 20 MG: 20 CAPSULE ORAL at 10:01

## 2018-01-13 RX ADMIN — CIPROFLOXACIN HYDROCHLORIDE 750 MG: 750 TABLET, FILM COATED ORAL at 10:01

## 2018-01-13 RX ADMIN — VITAMIN C 1 TABLET: TAB at 10:01

## 2018-01-13 RX ADMIN — BACLOFEN 5 MG: 10 TABLET ORAL at 02:01

## 2018-01-13 RX ADMIN — FINASTERIDE 5 MG: 5 TABLET, FILM COATED ORAL at 10:01

## 2018-01-13 RX ADMIN — AMPICILLIN SODIUM AND SULBACTAM SODIUM 3 G: 2; 1 INJECTION, POWDER, FOR SOLUTION INTRAMUSCULAR; INTRAVENOUS at 10:01

## 2018-01-13 RX ADMIN — ATORVASTATIN CALCIUM 40 MG: 20 TABLET, FILM COATED ORAL at 10:01

## 2018-01-13 RX ADMIN — MUPIROCIN: 20 OINTMENT TOPICAL at 10:01

## 2018-01-13 RX ADMIN — BETHANECHOL CHLORIDE 10 MG: 10 TABLET ORAL at 09:01

## 2018-01-13 RX ADMIN — DANTROLENE SODIUM 25 MG: 25 CAPSULE ORAL at 05:01

## 2018-01-13 RX ADMIN — MUPIROCIN: 20 OINTMENT TOPICAL at 09:01

## 2018-01-13 RX ADMIN — AMPICILLIN SODIUM AND SULBACTAM SODIUM 3 G: 2; 1 INJECTION, POWDER, FOR SOLUTION INTRAMUSCULAR; INTRAVENOUS at 02:01

## 2018-01-13 RX ADMIN — DANTROLENE SODIUM 25 MG: 25 CAPSULE ORAL at 02:01

## 2018-01-13 RX ADMIN — DANTROLENE SODIUM 25 MG: 25 CAPSULE ORAL at 09:01

## 2018-01-13 RX ADMIN — DONEPEZIL HYDROCHLORIDE 5 MG: 5 TABLET, FILM COATED ORAL at 10:01

## 2018-01-13 RX ADMIN — LEVOTHYROXINE SODIUM 75 MCG: 75 TABLET ORAL at 10:01

## 2018-01-13 RX ADMIN — TAMSULOSIN HYDROCHLORIDE 0.4 MG: 0.4 CAPSULE ORAL at 10:01

## 2018-01-13 RX ADMIN — LEVETIRACETAM 1000 MG: 100 SOLUTION ORAL at 10:01

## 2018-01-13 RX ADMIN — BACLOFEN 5 MG: 10 TABLET ORAL at 09:01

## 2018-01-13 RX ADMIN — ASPIRIN 81 MG CHEWABLE TABLET 81 MG: 81 TABLET CHEWABLE at 10:01

## 2018-01-13 RX ADMIN — CIPROFLOXACIN HYDROCHLORIDE 750 MG: 750 TABLET, FILM COATED ORAL at 09:01

## 2018-01-13 RX ADMIN — AMPICILLIN SODIUM AND SULBACTAM SODIUM 3 G: 2; 1 INJECTION, POWDER, FOR SOLUTION INTRAMUSCULAR; INTRAVENOUS at 11:01

## 2018-01-13 NOTE — PLAN OF CARE
Problem: Patient Care Overview  Goal: Plan of Care Review  Outcome: Ongoing (interventions implemented as appropriate)  Pt not following commands. Unable to assess orientation d/t pt being nonverbal. POC maintained. Education provided to family concerning POC, respiratory status. Vital signs stable throughout shift. Pt bathed and linens changed. Call light within reach. Will continue to monitor.

## 2018-01-13 NOTE — PROGRESS NOTES
[facility] Hospital Medicine  Progress Note    Patient Name: Enrique Yancey  MRN: [id number]  Patient Class: IP- Inpatient   Admission Date: 12/17/2017  Length of Stay: 27 days  Attending Physician: Odilon Reyes MD  Primary Care Provider: Ang Hassan MD    Hospital Medicine Team: [facility] HOSP MED 1 Phu Regan MD    Subjective:     Principal Problem:Seizure    HPI:  Enrique Yancey is a 67 y.o. male with with a medical history significant for drug-induced dermatomyositis, essential hypertension, left ICA stenosis with subsequent MCA CVA with residual right spastic hemiparesis who presented to [facility] with new onset seizure from [facility] on 12/17/2017.  He presented to outside ED with seizure activity for approximately 20 minutes. Per EMS, he was diaphoretic on arrival and tachycardic. Per nursing home, pt is nonverbal at baseline, but can focus on the speaker. He has a trach in place. He is being admitted to [facility] for a higher level of care.     Hospital Course:  12/17: Admit to [facility], EEG pending, Keppra 1 G  at OSH and 500 Q 12 started, CTH: No acute process   12/18: Increase keppra per epilepsy, sputum culture, trend procal  12/19: Treated for ESBL UTI and HAP (respiratory cultures growing Providencia/Proteus) with Zosyn for 7 days. Patient remained clinically stable during his hospital course and discharge to [facility] deferred secondary to high settings on trach collar  12/30: Stepped-down to hospital medicine 1  01/03: Awaiting placement.   01/04: No acute issues over night. Awaiting placement.   1/05: JOSÉ MIGUELON, TFs running at 40cc/hr increase to 45cc./hr, pt remains severely debilitated still pending re-evaluation by facilities that can provide for him w/ PEG & Trach, will initiate GOC discussion w/ daughter while he is inpatient, plan for discussion Sunday (1/7) morning    01/06: No acute events over night.     01/07: No acute issue over night. Goals of cared discussion with daughter tomorrow  01/08/18. Still awaiting placement that will allow pt with trach and PEG tubes.     01/08 No acute events. Daughter stopped by for goals of discussion. Would like to make patient Full code, at least until he sees his granddaughter yet to be born. Has rejections from a couple of nursing homes per SW. Stays afebrile, HD stable.     01/08: Urine cx on 01/03 with pseudomonas. Repeat ucx to r/o ESBL, f/u results. Deferred not to treat pseudomonas for now, will repeat urine culture and if persistently positive, will consider treating. Most likely colonization. Still awaiting placement.     1/10: Patient with new onset fever overnight up to 101.9°F with associated hypotension and tachycardia.  Good response to fluid bolus. previous urine cultures show pseudomonas sensitive to Cipro ciprofloxacin started.  There is also evidence of tube feeds surrounding trach site concerning for aspiration, chest x-ray with some opacification of right middle lobe, patient started on 7 day course of Unasyn. BCx, UCx pending    01/11: Patient HD stable. Repeat UCx, no growth. Will remove contact precaution as pt has two negative cx for ESBL. Previous UCx with pseudomonas, on Cipro. On Unasyn for possible Aspiration PNA. He is otherwise stable. Plan to wean oxygen to 3L: Nursing home will not accept if oxygen requirement is above 3L.     01/12: No acute events over night. Satting in the high 90's with out oxygen requirement. Remains stable. Awaiting placement.     01/13: Patient remains HD stable. Still not requiring oxygen and sats well above the 90's. Awaiting placement.     Interval History: NAEON.     Review of Systems   Unable to perform ROS: Acuity of condition     Objective:     Vital Signs (Most Recent):  Temp: 97.6 °F (36.4 °C) (01/13/18 0721)  Pulse: 89 (01/13/18 0721)  Resp: 16 (01/13/18 0721)  BP: (!) 169/89 (01/13/18 0721)  SpO2: 96 % (01/13/18 0721) Vital Signs (24h Range):  Temp:  [96.6 °F (35.9 °C)-98.2 °F (36.8 °C)] 97.6 °F  (36.4 °C)  Pulse:  [74-94] 89  Resp:  [16-19] 16  SpO2:  [95 %-100 %] 96 %  BP: (126-169)/(77-89) 169/89     Weight: 54 kg (119 lb 0.8 oz)  Body mass index is 19.81 kg/m².    Intake/Output Summary (Last 24 hours) at 01/13/18 1230  Last data filed at 01/13/18 0540   Gross per 24 hour   Intake              600 ml   Output                0 ml   Net              600 ml      Physical Exam   Constitutional: He appears cachectic. He appears ill.   HENT:   Head: Normocephalic and atraumatic.   Eyes: EOM are normal. Pupils are equal, round, and reactive to light.   Neck: Neck supple.   Cardiovascular: Normal rate and regular rhythm.    Pulmonary/Chest: Effort normal. No respiratory distress.   Trach noted    Abdominal: Soft.   PEG placed   Musculoskeletal:   B/l UE & LE contracted   Neurological:   Somnolent but does open eye to voice and stimuli.  Speech: non-verbal.  Does not follow commands.       Skin: Skin is warm and dry.   Psychiatric: Cognition and memory are impaired.       Significant Labs:   CBC:   Recent Labs  Lab 01/12/18  0439   WBC 6.00   HGB 9.5*   HCT 28.5*        CMP:   Recent Labs  Lab 01/12/18  0439      K 3.6      CO2 20*   GLU 93   BUN 27*   CREATININE 1.1   CALCIUM 8.9   PROT 7.6   ALBUMIN 2.3*   BILITOT 0.3   ALKPHOS 132   AST 35   ALT 42   ANIONGAP 8   EGFRNONAA >60.0       Significant Imaging: I have reviewed all pertinent imaging results/findings within the past 24 hours.    Assessment/Plan:      * Seizure    - Patient without seizure activity since admission.  - Likely instigated by dehydration and infections (ESBL UTI and HAP - patient has completed therapy).   -Currently on Cipro for pseudomonas UTI for total of 7 days. Repeat urine cx negative   - Continue levetiracetam 1 g by PEG BID.      Pharyngoesophageal dysphagia    - s/p CVA.  - Was Tolerating TF at goal rate of 45cc/hr, however rate decreased to trickle feeds on January 10 secondary to concern for aspiration with  associated fever  -Treating with Unasyn for suspected aspiration PNA given fever and HD instability on 01/10/17, has been afebrile, HD stable since, will finish 7 day course of treatment.       Debility    Pt remains severely debilitated still pending re-evaluation by facilities that can provide for him w/ PEG & Trach, GOC discussed with daughter who stated, would like patient full code for now.      Aphasia    - s/p CVA  -Expressive aphasia, unclear if pt has receptive aphasia, doesn't follow commands, opens eyes to voice.       CKD (chronic kidney disease) stage 3, GFR 30-59 ml/min    - Avoid nephrotic agents  - Renal function at baseline  - continue to monitor       Normocytic anemia    - Hemoglobin stable at ~10, without active bleeding.  - Likely component of anemia of chronic disease.       Benign prostatic hyperplasia with lower urinary tract symptoms    - Continue finasteride 5 mg per G tuve daily and tamsulosin 0.4 mg per G tube TID.   -Currently on Cipro for pseudomonas UTI, previously treated for ESBL E.col      Hypothyroidism    - Continue levothyroxine 75 mcg per G tube daily.      Cerebrovascular accident (CVA) due to thrombosis of left carotid artery    -Prior left MCA CVA in 2016 with residual right spastic hemiparesis, purportedly s/p CEA.   - See below.       Right spastic hemiparesis    - From previous L MCA CVA.  - Continue dantrolene 25 mg per G tube TID and baclofen 5 mg per G tube TID.     VTE Risk Mitigation         Ordered     enoxaparin injection 40 mg  Every 24 hours (non-standard times)     Route:  Subcutaneous        12/17/17 1131     Medium Risk of VTE  Once      12/17/17 0449     Place sequential compression device  Until discontinued      12/17/17 5593        Phu Regan MD  Department of Hospital Medicine   [facility]

## 2018-01-13 NOTE — PROGRESS NOTES
[facility] Hospital Medicine  Progress Note    Patient Name: Enrique Yancey  MRN: [id number]  Patient Class: IP- Inpatient   Admission Date: 12/17/2017  Length of Stay: 26 days  Attending Physician: Odilon Reyes MD  Primary Care Provider: Ang Hassan MD    Hospital Medicine Team: [facility] 1 Phu Regan MD    Subjective:     Principal Problem:Seizure    HPI:  Enrique Yancey is a 67 y.o. male with with a medical history significant for drug-induced dermatomyositis, essential hypertension, left ICA stenosis with subsequent MCA CVA with residual right spastic hemiparesis who presented to [facility] with new onset seizure from [facility] on 12/17/2017.  He presented to outside ED with seizure activity for approximately 20 minutes. Per EMS, he was diaphoretic on arrival and tachycardic. Per nursing home, pt is nonverbal at baseline, but can focus on the speaker. He has a trach in place. He is being admitted to [facility] for a higher level of care.     Hospital Course:  12/17: Admit to [facility], EEG pending, Keppra 1 G  at OSH and 500 Q 12 started, CTH: No acute process   12/18: Increase keppra per epilepsy, sputum culture, trend procal  12/19: Treated for ESBL UTI and HAP (respiratory cultures growing Providencia/Proteus) with Zosyn for 7 days. Patient remained clinically stable during his hospital course and discharge to [facility] deferred secondary to high settings on trach collar  12/30: Stepped-down to hospital medicine 1  01/03: Awaiting placement.   01/04: No acute issues over night. Awaiting placement.   1/05: JOSÉ MIGUELON, TFs running at 40cc/hr increase to 45cc./hr, pt remains severely debilitated still pending re-evaluation by facilities that can provide for him w/ PEG & Trach, will initiate GOC discussion w/ daughter while he is inpatient, plan for discussion Sunday (1/7) morning    01/06: No acute events over night.     01/07: No acute issue over night. Goals of cared discussion with daughter tomorrow  01/08/18. Still awaiting placement that will allow pt with trach and PEG tubes.     01/08 No acute events. Daughter stopped by for goals of discussion. Would like to make patient Full code, at least until he sees his granddaughter yet to be born. Has rejections from a couple of nursing homes per SW. Stays afebrile, HD stable.     01/08: Urine cx on 01/03 with pseudomonas. Repeat ucx to r/o ESBL, f/u results. Deferred not to treat pseudomonas for now, will repeat urine culture and if persistently positive, will consider treating. Most likely colonization. Still awaiting placement.     1/10: Patient with new onset fever overnight up to 101.9°F with associated hypotension and tachycardia.  Good response to fluid bolus. previous urine cultures show pseudomonas sensitive to Cipro ciprofloxacin started.  There is also evidence of tube feeds surrounding trach site concerning for aspiration, chest x-ray with some opacification of right middle lobe, patient started on 7 day course of Unasyn. BCx, UCx pending    01/11: Patient HD stable. Repeat UCx, no growth. Will remove contact precaution as pt has two negative cx for ESBL. Previous UCx with pseudomonas, on Cipro. On Unasyn for possible Aspiration PNA. He is otherwise stable. Plan to wean oxygen to 3L: Nursing home will not accept if oxygen requirement is above 3L.     01/12: No acute events over night. Satting in the high 90's with out oxygen requirement. Remains stable. Awaiting placement.     Interval History: NAEON. Satting well in the 90's on room air. Awaiting placement.     Review of Systems   Unable to perform ROS: Acuity of condition     Objective:     Vital Signs (Most Recent):  Temp: 97 °F (36.1 °C) (01/12/18 1616)  Pulse: 74 (01/12/18 1616)  Resp: 16 (01/12/18 1616)  BP: (!) 160/88 (01/12/18 1616)  SpO2: 95 % (01/12/18 1616) Vital Signs (24h Range):  Temp:  [97 °F (36.1 °C)-99.7 °F (37.6 °C)] 97 °F (36.1 °C)  Pulse:  [74-98] 74  Resp:  [16-20] 16  SpO2:  [93  %-99 %] 95 %  BP: (120-160)/(69-88) 160/88     Weight: 54 kg (119 lb 0.8 oz)  Body mass index is 19.81 kg/m².    Intake/Output Summary (Last 24 hours) at 01/12/18 1905  Last data filed at 01/12/18 1800   Gross per 24 hour   Intake              400 ml   Output                0 ml   Net              400 ml      Physical Exam   Constitutional: He appears cachectic. He appears ill.   HENT:   Head: Normocephalic and atraumatic.   Eyes: EOM are normal. Pupils are equal, round, and reactive to light.   Neck: Neck supple.   Cardiovascular: Normal rate and regular rhythm.    Pulmonary/Chest: Effort normal. No respiratory distress.   Trach noted    Abdominal: Soft.   PEG placed   Musculoskeletal:   B/l UE & LE contracted   Neurological:   Somnolent but does open eye to voice and stimuli.  Speech: non-verbal.  Does not follow commands.       Skin: Skin is warm and dry.   Psychiatric: Cognition and memory are impaired.       Significant Labs:   CBC:   Recent Labs  Lab 01/12/18  0439   WBC 6.00   HGB 9.5*   HCT 28.5*        CMP:   Recent Labs  Lab 01/12/18  0439      K 3.6      CO2 20*   GLU 93   BUN 27*   CREATININE 1.1   CALCIUM 8.9   PROT 7.6   ALBUMIN 2.3*   BILITOT 0.3   ALKPHOS 132   AST 35   ALT 42   ANIONGAP 8   EGFRNONAA >60.0       Significant Imaging: I have reviewed all pertinent imaging results/findings within the past 24 hours.    Assessment/Plan:      * Seizure    - Patient without seizure activity since admission.  - Likely instigated by dehydration and infections (ESBL UTI and HAP - patient has completed therapy).   - Continue levetiracetam 1 g by PEG BID.      Debility      Pt remains severely debilitated still pending re-evaluation by facilities that can provide for him w/ PEG & Trach, GOC discussed with daughter who stated, would like patient full code for now.      Normocytic anemia    - Hemoglobin stable at ~10, without active bleeding.  - Likely component of anemia of chronic disease.        Benign prostatic hyperplasia with lower urinary tract symptoms    - Continue finasteride 5 mg per G tuve daily and tamsulosin 0.4 mg per G tube TID.         Hypothyroidism    - Continue levothyroxine 75 mcg per G tube daily.      Cerebrovascular accident (CVA) due to thrombosis of left carotid artery    - Prior left MCA CVA in 2016 with residual right spastic hemiparesis, purportedly s/p CEA.   - See below.       Pharyngoesophageal dysphagia    - s/p CVA.  - Was Tolerating TF at goal rate of 45cc/hr, however rate decreased to trickle feeds on January 10 secondary to concern for aspiration with associated fever      Right spastic hemiparesis    - From previous L MCA CVA.  - Continue dantrolene 25 mg per G tube TID and baclofen 5 mg per G tube TID.      Aphasia    - s/p CVA      CKD (chronic kidney disease) stage 3, GFR 30-59 ml/min    - Avoid nephrotic agents  - Renal function at baseline  - continue to monitor         VTE Risk Mitigation         Ordered     enoxaparin injection 40 mg  Every 24 hours (non-standard times)     Route:  Subcutaneous        12/17/17 1131     Medium Risk of VTE  Once      12/17/17 0449     Place sequential compression device  Until discontinued      12/17/17 0449          Phu Regan MD  Department of Hospital Medicine   [facility]

## 2018-01-13 NOTE — ASSESSMENT & PLAN NOTE
- s/p CVA  -Expressive aphasia, unclear if pt has receptive aphasia, doesn't follow commands, opens eyes to voice.

## 2018-01-13 NOTE — SUBJECTIVE & OBJECTIVE
Interval History: NAEON. Satting well in the 90's on room air. Awaiting placement.     Review of Systems   Unable to perform ROS: Acuity of condition     Objective:     Vital Signs (Most Recent):  Temp: 97 °F (36.1 °C) (01/12/18 1616)  Pulse: 74 (01/12/18 1616)  Resp: 16 (01/12/18 1616)  BP: (!) 160/88 (01/12/18 1616)  SpO2: 95 % (01/12/18 1616) Vital Signs (24h Range):  Temp:  [97 °F (36.1 °C)-99.7 °F (37.6 °C)] 97 °F (36.1 °C)  Pulse:  [74-98] 74  Resp:  [16-20] 16  SpO2:  [93 %-99 %] 95 %  BP: (120-160)/(69-88) 160/88     Weight: 54 kg (119 lb 0.8 oz)  Body mass index is 19.81 kg/m².    Intake/Output Summary (Last 24 hours) at 01/12/18 1905  Last data filed at 01/12/18 1800   Gross per 24 hour   Intake              400 ml   Output                0 ml   Net              400 ml      Physical Exam   Constitutional: He appears cachectic. He appears ill.   HENT:   Head: Normocephalic and atraumatic.   Eyes: EOM are normal. Pupils are equal, round, and reactive to light.   Neck: Neck supple.   Cardiovascular: Normal rate and regular rhythm.    Pulmonary/Chest: Effort normal. No respiratory distress.   Trach noted    Abdominal: Soft.   PEG placed   Musculoskeletal:   B/l UE & LE contracted   Neurological:   Somnolent but does open eye to voice and stimuli.  Speech: non-verbal.  Does not follow commands.       Skin: Skin is warm and dry.   Psychiatric: Cognition and memory are impaired.       Significant Labs:   CBC:   Recent Labs  Lab 01/12/18  0439   WBC 6.00   HGB 9.5*   HCT 28.5*        CMP:   Recent Labs  Lab 01/12/18  0439      K 3.6      CO2 20*   GLU 93   BUN 27*   CREATININE 1.1   CALCIUM 8.9   PROT 7.6   ALBUMIN 2.3*   BILITOT 0.3   ALKPHOS 132   AST 35   ALT 42   ANIONGAP 8   EGFRNONAA >60.0       Significant Imaging: I have reviewed all pertinent imaging results/findings within the past 24 hours.

## 2018-01-13 NOTE — SUBJECTIVE & OBJECTIVE
Interval History: NAEON.     Review of Systems   Unable to perform ROS: Acuity of condition     Objective:     Vital Signs (Most Recent):  Temp: 97.6 °F (36.4 °C) (01/13/18 0721)  Pulse: 89 (01/13/18 0721)  Resp: 16 (01/13/18 0721)  BP: (!) 169/89 (01/13/18 0721)  SpO2: 96 % (01/13/18 0721) Vital Signs (24h Range):  Temp:  [96.6 °F (35.9 °C)-98.2 °F (36.8 °C)] 97.6 °F (36.4 °C)  Pulse:  [74-94] 89  Resp:  [16-19] 16  SpO2:  [95 %-100 %] 96 %  BP: (126-169)/(77-89) 169/89     Weight: 54 kg (119 lb 0.8 oz)  Body mass index is 19.81 kg/m².    Intake/Output Summary (Last 24 hours) at 01/13/18 1230  Last data filed at 01/13/18 0540   Gross per 24 hour   Intake              600 ml   Output                0 ml   Net              600 ml      Physical Exam   Constitutional: He appears cachectic. He appears ill.   HENT:   Head: Normocephalic and atraumatic.   Eyes: EOM are normal. Pupils are equal, round, and reactive to light.   Neck: Neck supple.   Cardiovascular: Normal rate and regular rhythm.    Pulmonary/Chest: Effort normal. No respiratory distress.   Trach noted    Abdominal: Soft.   PEG placed   Musculoskeletal:   B/l UE & LE contracted   Neurological:   Somnolent but does open eye to voice and stimuli.  Speech: non-verbal.  Does not follow commands.       Skin: Skin is warm and dry.   Psychiatric: Cognition and memory are impaired.       Significant Labs:   CBC:   Recent Labs  Lab 01/12/18  0439   WBC 6.00   HGB 9.5*   HCT 28.5*        CMP:   Recent Labs  Lab 01/12/18  0439      K 3.6      CO2 20*   GLU 93   BUN 27*   CREATININE 1.1   CALCIUM 8.9   PROT 7.6   ALBUMIN 2.3*   BILITOT 0.3   ALKPHOS 132   AST 35   ALT 42   ANIONGAP 8   EGFRNONAA >60.0       Significant Imaging: I have reviewed all pertinent imaging results/findings within the past 24 hours.

## 2018-01-13 NOTE — ASSESSMENT & PLAN NOTE
- s/p CVA.  - Was Tolerating TF at goal rate of 45cc/hr, however rate decreased to trickle feeds on January 10 secondary to concern for aspiration with associated fever  -Treating with Unasyn for suspected aspiration PNA given fever and HD instability on 01/10/17, has been afebrile, HD stable since, will finish 7 day course of treatment.

## 2018-01-13 NOTE — ASSESSMENT & PLAN NOTE
- Continue finasteride 5 mg per G tuve daily and tamsulosin 0.4 mg per G tube TID.   -Currently on Cipro for pseudomonas UTI, previously treated for ESBL E.col

## 2018-01-13 NOTE — ASSESSMENT & PLAN NOTE
- Patient without seizure activity since admission.  - Likely instigated by dehydration and infections (ESBL UTI and HAP - patient has completed therapy).   -Currently on Cipro for pseudomonas UTI for total of 7 days. Repeat urine cx negative   - Continue levetiracetam 1 g by PEG BID.

## 2018-01-14 PROBLEM — N39.0 UTI (URINARY TRACT INFECTION): Status: ACTIVE | Noted: 2018-01-14

## 2018-01-14 LAB
BACTERIA BLD CULT: NORMAL
POCT GLUCOSE: 108 MG/DL (ref 70–110)
POCT GLUCOSE: 123 MG/DL (ref 70–110)
POCT GLUCOSE: 92 MG/DL (ref 70–110)
POCT GLUCOSE: 95 MG/DL (ref 70–110)
POCT GLUCOSE: 96 MG/DL (ref 70–110)

## 2018-01-14 PROCEDURE — 99233 SBSQ HOSP IP/OBS HIGH 50: CPT | Mod: ,,, | Performed by: HOSPITALIST

## 2018-01-14 PROCEDURE — 11000001 HC ACUTE MED/SURG PRIVATE ROOM

## 2018-01-14 PROCEDURE — 25000003 PHARM REV CODE 250: Performed by: NURSE PRACTITIONER

## 2018-01-14 PROCEDURE — 25000003 PHARM REV CODE 250: Performed by: PSYCHIATRY & NEUROLOGY

## 2018-01-14 PROCEDURE — 63600175 PHARM REV CODE 636 W HCPCS: Performed by: STUDENT IN AN ORGANIZED HEALTH CARE EDUCATION/TRAINING PROGRAM

## 2018-01-14 PROCEDURE — 94761 N-INVAS EAR/PLS OXIMETRY MLT: CPT

## 2018-01-14 PROCEDURE — 99900026 HC AIRWAY MAINTENANCE (STAT)

## 2018-01-14 PROCEDURE — 63600175 PHARM REV CODE 636 W HCPCS: Performed by: PHYSICIAN ASSISTANT

## 2018-01-14 PROCEDURE — 25000003 PHARM REV CODE 250: Performed by: STUDENT IN AN ORGANIZED HEALTH CARE EDUCATION/TRAINING PROGRAM

## 2018-01-14 PROCEDURE — 25000003 PHARM REV CODE 250: Performed by: PHYSICIAN ASSISTANT

## 2018-01-14 PROCEDURE — 25000003 PHARM REV CODE 250: Performed by: HOSPITALIST

## 2018-01-14 RX ADMIN — AMPICILLIN SODIUM AND SULBACTAM SODIUM 3 G: 2; 1 INJECTION, POWDER, FOR SOLUTION INTRAMUSCULAR; INTRAVENOUS at 11:01

## 2018-01-14 RX ADMIN — TAMSULOSIN HYDROCHLORIDE 0.4 MG: 0.4 CAPSULE ORAL at 09:01

## 2018-01-14 RX ADMIN — MUPIROCIN: 20 OINTMENT TOPICAL at 09:01

## 2018-01-14 RX ADMIN — AMPICILLIN SODIUM AND SULBACTAM SODIUM 3 G: 2; 1 INJECTION, POWDER, FOR SOLUTION INTRAMUSCULAR; INTRAVENOUS at 06:01

## 2018-01-14 RX ADMIN — BACLOFEN 5 MG: 10 TABLET ORAL at 02:01

## 2018-01-14 RX ADMIN — AMPICILLIN SODIUM AND SULBACTAM SODIUM 3 G: 2; 1 INJECTION, POWDER, FOR SOLUTION INTRAMUSCULAR; INTRAVENOUS at 02:01

## 2018-01-14 RX ADMIN — DANTROLENE SODIUM 25 MG: 25 CAPSULE ORAL at 02:01

## 2018-01-14 RX ADMIN — FAMOTIDINE 20 MG: 20 TABLET, FILM COATED ORAL at 09:01

## 2018-01-14 RX ADMIN — LEVOTHYROXINE SODIUM 75 MCG: 75 TABLET ORAL at 09:01

## 2018-01-14 RX ADMIN — LEVETIRACETAM 1000 MG: 100 SOLUTION ORAL at 09:01

## 2018-01-14 RX ADMIN — DANTROLENE SODIUM 25 MG: 25 CAPSULE ORAL at 06:01

## 2018-01-14 RX ADMIN — ATORVASTATIN CALCIUM 40 MG: 20 TABLET, FILM COATED ORAL at 09:01

## 2018-01-14 RX ADMIN — BETHANECHOL CHLORIDE 10 MG: 10 TABLET ORAL at 02:01

## 2018-01-14 RX ADMIN — BACLOFEN 5 MG: 10 TABLET ORAL at 06:01

## 2018-01-14 RX ADMIN — FLUOXETINE 20 MG: 20 CAPSULE ORAL at 09:01

## 2018-01-14 RX ADMIN — POLYETHYLENE GLYCOL 3350 17 G: 17 POWDER, FOR SOLUTION ORAL at 09:01

## 2018-01-14 RX ADMIN — ENOXAPARIN SODIUM 40 MG: 100 INJECTION SUBCUTANEOUS at 04:01

## 2018-01-14 RX ADMIN — CIPROFLOXACIN HYDROCHLORIDE 750 MG: 750 TABLET, FILM COATED ORAL at 09:01

## 2018-01-14 RX ADMIN — DONEPEZIL HYDROCHLORIDE 5 MG: 5 TABLET, FILM COATED ORAL at 09:01

## 2018-01-14 RX ADMIN — ASPIRIN 81 MG CHEWABLE TABLET 81 MG: 81 TABLET CHEWABLE at 09:01

## 2018-01-14 RX ADMIN — STANDARDIZED SENNA CONCENTRATE AND DOCUSATE SODIUM 1 TABLET: 8.6; 5 TABLET, FILM COATED ORAL at 09:01

## 2018-01-14 RX ADMIN — MINERAL OIL AND WHITE PETROLATUM: 150; 830 OINTMENT OPHTHALMIC at 09:01

## 2018-01-14 RX ADMIN — BETHANECHOL CHLORIDE 10 MG: 10 TABLET ORAL at 09:01

## 2018-01-14 RX ADMIN — DANTROLENE SODIUM 25 MG: 25 CAPSULE ORAL at 09:01

## 2018-01-14 RX ADMIN — BACLOFEN 5 MG: 10 TABLET ORAL at 09:01

## 2018-01-14 RX ADMIN — FINASTERIDE 5 MG: 5 TABLET, FILM COATED ORAL at 09:01

## 2018-01-14 RX ADMIN — BETHANECHOL CHLORIDE 10 MG: 10 TABLET ORAL at 06:01

## 2018-01-14 RX ADMIN — VITAMIN C 1 TABLET: TAB at 09:01

## 2018-01-14 NOTE — ASSESSMENT & PLAN NOTE
- s/p CVA.  - Was Tolerating TF at goal rate of 45cc/hr, however rate decreased to trickle feeds on January 10 secondary to concern for aspiration with associated fever  -Treating with Unasyn for suspected aspiration PNA given fever and HD instability on 01/10/17, has been afebrile since, HD stable since, will finish 7 day course of treatment unasyn today

## 2018-01-14 NOTE — PLAN OF CARE
Problem: Patient Care Overview  Goal: Plan of Care Review  Slept most of this shift. Tolerating  Room air 21% per trach collar. VSS. Tolerating tube feeding no residual this shift. Repositioned every 2 hours this shift.HOB has been elevated 30 degrees this shift.Change out tubing for tube feeding

## 2018-01-14 NOTE — SUBJECTIVE & OBJECTIVE
Interval History: NAEON.     Review of Systems   Unable to perform ROS: Acuity of condition     Objective:     Vital Signs (Most Recent):  Temp: 98.6 °F (37 °C) (01/14/18 0900)  Pulse: 84 (01/14/18 0900)  Resp: 18 (01/14/18 0900)  BP: 130/81 (01/14/18 0900)  SpO2: 97 % (01/14/18 0829) Vital Signs (24h Range):  Temp:  [97.9 °F (36.6 °C)-98.9 °F (37.2 °C)] 98.6 °F (37 °C)  Pulse:  [82-92] 84  Resp:  [16-20] 18  SpO2:  [95 %-98 %] 97 %  BP: (130-179)/(79-95) 130/81     Weight: 54 kg (119 lb 0.8 oz)  Body mass index is 19.81 kg/m².    Intake/Output Summary (Last 24 hours) at 01/14/18 1031  Last data filed at 01/14/18 0651   Gross per 24 hour   Intake              830 ml   Output                0 ml   Net              830 ml      Physical Exam   Constitutional: He appears cachectic. He appears ill.   HENT:   Head: Normocephalic and atraumatic.   Eyes: EOM are normal. Pupils are equal, round, and reactive to light.   Neck: Neck supple.   Cardiovascular: Normal rate and regular rhythm.    Pulmonary/Chest: Effort normal. No respiratory distress.   Trach noted    Abdominal: Soft.   PEG placed   Musculoskeletal:   B/l UE & LE contracted   Neurological:   Somnolent but does open eye to voice and stimuli.  Speech: non-verbal.  Does not follow commands.       Skin: Skin is warm and dry.   Psychiatric: Cognition and memory are impaired.       Significant Labs:   CBC/Anemia Labs:      Recent Labs  Lab 01/08/18  0610 01/10/18  0506 01/12/18  0439   WBC 4.85 7.67 6.00   HGB 9.8* 10.0* 9.5*   HCT 29.4* 31.1* 28.5*    178 171   MCV 94 96 94   RDW 15.8* 16.0* 16.0*         Chemistries:      Recent Labs  Lab 01/08/18  0610 01/10/18  0506 01/12/18  0439   * 132* 136   K 4.1 4.4 3.6    106 108   CO2 21* 18* 20*   BUN 30* 30* 27*   CREATININE 1.0 1.3 1.1   CALCIUM 8.2* 9.0 8.9   PROT 7.8 8.0 7.6   BILITOT 0.4 0.4 0.3   ALKPHOS 159* 156* 132   ALT 53* 47* 42   AST 41* 40 35   MG 1.9 1.9 1.9   PHOS 3.1 2.7 3.0            Significant Imaging: I have reviewed all pertinent imaging results/findings within the past 24 hours.

## 2018-01-14 NOTE — ASSESSMENT & PLAN NOTE
- Continue finasteride 5 mg per G tuve daily and tamsulosin 0.4 mg per G tube TID.   -Currently on Cipro (last day) for pseudomonas UTI, previously treated for ESBL E.col

## 2018-01-14 NOTE — PROGRESS NOTES
[facility] Hospital Medicine  Progress Note    Patient Name: Enrique Yancey  MRN: [id number]  Patient Class: IP- Inpatient   Admission Date: 12/17/2017  Length of Stay: 28 days  Attending Physician: Odilon Reyes MD  Primary Care Provider: Ang Hassan MD    [place] [facility] [place] Ivonne Hess MD    Subjective:     Principal Problem:Seizure    HPI:  Enrique Yancey is a 67 y.o. male with with a medical history significant for drug-induced dermatomyositis, essential hypertension, left ICA stenosis with subsequent MCA CVA with residual right spastic hemiparesis who [place] [facility] [place] [facility] on 12/17/2017.  He presented to outside ED with seizure activity for approximately 20 minutes. Per EMS, he was diaphoretic on arrival and tachycardic. Per nursing home, pt is nonverbal at baseline, but can focus on the speaker. He has a trach in place. He is being admitted to [facility] for a higher level of care.     Hospital Course:  12/17: Admit to [facility], EEG pending, Keppra 1 G  at OSH and 500 Q 12 started, CTH: No acute process   12/18: Increase keppra per epilepsy, sputum culture, trend procal  12/19: Treated for ESBL UTI and HAP (respiratory cultures growing Providencia/Proteus) with Zosyn for 7 days. Patient remained clinically stable during his hospital course and discharge to [facility] deferred secondary to high settings on trach collar  12/30: Stepped-down to hospital medicine 1  01/03: Awaiting placement.   01/04: No acute issues over night. Awaiting placement.   1/05: [place], TFs running at 40cc/hr increase to 45cc./hr, pt remains severely debilitated still pending re-evaluation by facilities that can provide for him w/ PEG & Trach, will initiate GOC discussion w/ daughter while he is inpatient, plan for discussion Sunday (1/7) morning    01/06: No acute events over night.     01/07: No acute issue over night. Goals of cared discussion with daughter tomorrow  01/08/18. Still awaiting placement that will allow pt with trach and PEG tubes.     01/08 No acute events. Daughter stopped by for goals of discussion. Would like to make patient Full code, at least until he sees his granddaughter yet to be born. Has rejections from a couple of nursing homes per SW. Stays afebrile, HD stable.     01/08: Urine cx on 01/03 with pseudomonas. Repeat ucx to r/o ESBL, f/u results. Deferred not to treat pseudomonas for now, will repeat urine culture and if persistently positive, will consider treating. Most likely colonization. Still awaiting placement.     1/10: Patient with new onset fever overnight up to 101.9°F with associated hypotension and tachycardia.  Good response to fluid bolus. previous urine cultures show pseudomonas sensitive to Cipro ciprofloxacin started.  There is also evidence of tube feeds surrounding trach site concerning for aspiration, chest x-ray with some opacification of right middle lobe, patient started on 7 day course of Unasyn. BCx, UCx pending    01/11: Patient HD stable. Repeat UCx, no growth. Will remove contact precaution as pt has two negative cx for ESBL. Previous UCx with pseudomonas, on Cipro. On Unasyn for possible Aspiration PNA. He is otherwise stable. Plan to wean oxygen to 3L: Nursing home will not accept if oxygen requirement is above 3L.     01/12: No acute events over night. Satting in the high 90's with out oxygen requirement. Remains stable. Awaiting placement.     01/13: Patient remains HD stable. Still not requiring oxygen and sats well above the 90's. Awaiting placement.     1/14: ERVIN pt on last day of cipro for UTI, day 5/7 of unasyn, 97% O2 on RA, pending placement     Interval History: ERVIN.     Review of Systems   Unable to perform ROS: Acuity of condition     Objective:     Vital Signs (Most Recent):  Temp: 98.6 °F (37 °C) (01/14/18 0900)  Pulse: 84 (01/14/18 0900)  Resp: 18 (01/14/18 0900)  BP: 130/81 (01/14/18 0900)  SpO2: 97 %  (01/14/18 0829) Vital Signs (24h Range):  Temp:  [97.9 °F (36.6 °C)-98.9 °F (37.2 °C)] 98.6 °F (37 °C)  Pulse:  [82-92] 84  Resp:  [16-20] 18  SpO2:  [95 %-98 %] 97 %  BP: (130-179)/(79-95) 130/81     Weight: 54 kg (119 lb 0.8 oz)  Body mass index is 19.81 kg/m².    Intake/Output Summary (Last 24 hours) at 01/14/18 1031  Last data filed at 01/14/18 0651   Gross per 24 hour   Intake              830 ml   Output                0 ml   Net              830 ml      Physical Exam   Constitutional: He appears cachectic. He appears ill.   HENT:   Head: Normocephalic and atraumatic.   Eyes: EOM are normal. Pupils are equal, round, and reactive to light.   Neck: Neck supple.   Cardiovascular: Normal rate and regular rhythm.    Pulmonary/Chest: Effort normal. No respiratory distress.   Trach noted    Abdominal: Soft.   PEG placed   Musculoskeletal:   B/l UE & LE contracted   Neurological:   Somnolent but does open eye to voice and stimuli.  Speech: non-verbal.  Does not follow commands.       Skin: Skin is warm and dry.   Psychiatric: Cognition and memory are impaired.       Significant Labs:   CBC/Anemia Labs:      Recent Labs  Lab 01/08/18  0610 01/10/18  0506 01/12/18  0439   WBC 4.85 7.67 6.00   HGB 9.8* 10.0* 9.5*   HCT 29.4* 31.1* 28.5*    178 171   MCV 94 96 94   RDW 15.8* 16.0* 16.0*         Chemistries:      Recent Labs  Lab 01/08/18  0610 01/10/18  0506 01/12/18  0439   * 132* 136   K 4.1 4.4 3.6    106 108   CO2 21* 18* 20*   BUN 30* 30* 27*   CREATININE 1.0 1.3 1.1   CALCIUM 8.2* 9.0 8.9   PROT 7.8 8.0 7.6   BILITOT 0.4 0.4 0.3   ALKPHOS 159* 156* 132   ALT 53* 47* 42   AST 41* 40 35   MG 1.9 1.9 1.9   PHOS 3.1 2.7 3.0           Significant Imaging: I have reviewed all pertinent imaging results/findings within the past 24 hours.    Assessment/Plan:      * Seizure    - Patient without seizure activity since admission.  - Likely instigated by dehydration and infections (ESBL UTI and HAP - patient  has completed therapy).   -Currently on Cipro for pseudomonas UTI for total of 7 days. Repeat urine cx negative   - Continue levetiracetam 1 g by PEG BID.          UTI (urinary tract infection)      Last day of cipro today to complete treatment course        Debility      Pt remains severely debilitated still pending re-evaluation by facilities that can provide for him w/ PEG & Trach, GOC discussed with daughter who stated, would like patient full code for now.        Normocytic anemia    - Hemoglobin stable at ~10, without active bleeding.  - Likely component of anemia of chronic disease.           Benign prostatic hyperplasia with lower urinary tract symptoms    - Continue finasteride 5 mg per G tuve daily and tamsulosin 0.4 mg per G tube TID.   -Currently on Cipro (last day) for pseudomonas UTI, previously treated for ESBL E.col        Hypothyroidism    - Continue levothyroxine 75 mcg per G tube daily.            Cerebrovascular accident (CVA) due to thrombosis of left carotid artery    - Prior left MCA CVA in 2016 with residual right spastic hemiparesis, purportedly s/p CEA.   - See below.         Pharyngoesophageal dysphagia    - s/p CVA.  - Was Tolerating TF at goal rate of 45cc/hr, however rate decreased to trickle feeds on January 10 secondary to concern for aspiration with associated fever  -Treating with Unasyn for suspected aspiration PNA given fever and HD instability on 01/10/17, has been afebrile since, HD stable since, will finish 7 day course of treatment unasyn today           Right spastic hemiparesis    - From previous L MCA CVA.  - Continue dantrolene 25 mg per G tube TID and baclofen 5 mg per G tube TID.          Aphasia    - s/p CVA  -Expressive aphasia, unclear if pt has receptive aphasia, doesn't follow commands, opens eyes to voice.           CKD (chronic kidney disease) stage 3, GFR 30-59 ml/min    - Avoid nephrotic agents  - Renal function at baseline  - continue to monitor         HTN  (hypertension)                VTE Risk Mitigation         Ordered     enoxaparin injection 40 mg  Every 24 hours (non-standard times)     Route:  Subcutaneous        12/17/17 1131     Medium Risk of VTE  Once      12/17/17 0449     Place sequential compression device  Until discontinued      12/17/17 0449              Ivonne Hess MD  Department of Hospital Medicine   [facility]

## 2018-01-14 NOTE — PLAN OF CARE
Problem: Patient Care Overview  Goal: Plan of Care Review  Outcome: Ongoing (interventions implemented as appropriate)  Safety measures maintained. VSS on RA per trach. HOB remains elevated at 30 degrees. Tolerating tube feedings.   40cc residual noted. Repositioned every 2 hours and wedge utilized. Bed in lowest locked position and bed alarm activated. NAD noted. Will continue to monitor.

## 2018-01-15 PROBLEM — N39.0 UTI (URINARY TRACT INFECTION): Status: RESOLVED | Noted: 2018-01-14 | Resolved: 2018-01-15

## 2018-01-15 LAB
ANION GAP SERPL CALC-SCNC: 6 MMOL/L
BASOPHILS # BLD AUTO: 0.02 K/UL
BASOPHILS NFR BLD: 0.3 %
BUN SERPL-MCNC: 26 MG/DL
CALCIUM SERPL-MCNC: 8.5 MG/DL
CHLORIDE SERPL-SCNC: 110 MMOL/L
CO2 SERPL-SCNC: 21 MMOL/L
CREAT SERPL-MCNC: 1.2 MG/DL
DIFFERENTIAL METHOD: ABNORMAL
EOSINOPHIL # BLD AUTO: 0.6 K/UL
EOSINOPHIL NFR BLD: 8.6 %
ERYTHROCYTE [DISTWIDTH] IN BLOOD BY AUTOMATED COUNT: 15.9 %
EST. GFR  (AFRICAN AMERICAN): >60 ML/MIN/1.73 M^2
EST. GFR  (NON AFRICAN AMERICAN): >60 ML/MIN/1.73 M^2
GLUCOSE SERPL-MCNC: 99 MG/DL
HCT VFR BLD AUTO: 29.1 %
HGB BLD-MCNC: 9.4 G/DL
IMM GRANULOCYTES # BLD AUTO: 0.03 K/UL
IMM GRANULOCYTES NFR BLD AUTO: 0.4 %
LYMPHOCYTES # BLD AUTO: 1.1 K/UL
LYMPHOCYTES NFR BLD: 15.5 %
MAGNESIUM SERPL-MCNC: 1.8 MG/DL
MCH RBC QN AUTO: 30.6 PG
MCHC RBC AUTO-ENTMCNC: 32.3 G/DL
MCV RBC AUTO: 95 FL
MONOCYTES # BLD AUTO: 0.9 K/UL
MONOCYTES NFR BLD: 12.6 %
NEUTROPHILS # BLD AUTO: 4.2 K/UL
NEUTROPHILS NFR BLD: 62.6 %
NRBC BLD-RTO: 0 /100 WBC
PHOSPHATE SERPL-MCNC: 3.1 MG/DL
PLATELET # BLD AUTO: 192 K/UL
PMV BLD AUTO: 11.2 FL
POCT GLUCOSE: 104 MG/DL (ref 70–110)
POCT GLUCOSE: 96 MG/DL (ref 70–110)
POTASSIUM SERPL-SCNC: 3.2 MMOL/L
RBC # BLD AUTO: 3.07 M/UL
SODIUM SERPL-SCNC: 137 MMOL/L
WBC # BLD AUTO: 6.77 K/UL

## 2018-01-15 PROCEDURE — 63600175 PHARM REV CODE 636 W HCPCS: Performed by: STUDENT IN AN ORGANIZED HEALTH CARE EDUCATION/TRAINING PROGRAM

## 2018-01-15 PROCEDURE — 99900026 HC AIRWAY MAINTENANCE (STAT)

## 2018-01-15 PROCEDURE — 25000003 PHARM REV CODE 250: Performed by: PHYSICIAN ASSISTANT

## 2018-01-15 PROCEDURE — 84100 ASSAY OF PHOSPHORUS: CPT

## 2018-01-15 PROCEDURE — 80048 BASIC METABOLIC PNL TOTAL CA: CPT

## 2018-01-15 PROCEDURE — 94761 N-INVAS EAR/PLS OXIMETRY MLT: CPT

## 2018-01-15 PROCEDURE — 85025 COMPLETE CBC W/AUTO DIFF WBC: CPT

## 2018-01-15 PROCEDURE — 25000003 PHARM REV CODE 250: Performed by: PSYCHIATRY & NEUROLOGY

## 2018-01-15 PROCEDURE — 25000003 PHARM REV CODE 250: Performed by: STUDENT IN AN ORGANIZED HEALTH CARE EDUCATION/TRAINING PROGRAM

## 2018-01-15 PROCEDURE — 99233 SBSQ HOSP IP/OBS HIGH 50: CPT | Mod: ,,, | Performed by: HOSPITALIST

## 2018-01-15 PROCEDURE — 25000003 PHARM REV CODE 250: Performed by: HOSPITALIST

## 2018-01-15 PROCEDURE — 63600175 PHARM REV CODE 636 W HCPCS: Performed by: PHYSICIAN ASSISTANT

## 2018-01-15 PROCEDURE — 36415 COLL VENOUS BLD VENIPUNCTURE: CPT

## 2018-01-15 PROCEDURE — 83735 ASSAY OF MAGNESIUM: CPT

## 2018-01-15 PROCEDURE — 25000003 PHARM REV CODE 250: Performed by: NURSE PRACTITIONER

## 2018-01-15 PROCEDURE — 11000001 HC ACUTE MED/SURG PRIVATE ROOM

## 2018-01-15 RX ORDER — IBUPROFEN 200 MG
16 TABLET ORAL
Status: DISCONTINUED | OUTPATIENT
Start: 2018-01-15 | End: 2018-01-20

## 2018-01-15 RX ORDER — IBUPROFEN 200 MG
24 TABLET ORAL
Status: DISCONTINUED | OUTPATIENT
Start: 2018-01-15 | End: 2018-01-20

## 2018-01-15 RX ORDER — POTASSIUM CHLORIDE 7.45 MG/ML
10 INJECTION INTRAVENOUS
Status: COMPLETED | OUTPATIENT
Start: 2018-01-15 | End: 2018-01-15

## 2018-01-15 RX ADMIN — DANTROLENE SODIUM 25 MG: 25 CAPSULE ORAL at 06:01

## 2018-01-15 RX ADMIN — BACLOFEN 5 MG: 10 TABLET ORAL at 10:01

## 2018-01-15 RX ADMIN — BETHANECHOL CHLORIDE 10 MG: 10 TABLET ORAL at 06:01

## 2018-01-15 RX ADMIN — FLUOXETINE 20 MG: 20 CAPSULE ORAL at 10:01

## 2018-01-15 RX ADMIN — VITAMIN C 1 TABLET: TAB at 10:01

## 2018-01-15 RX ADMIN — TAMSULOSIN HYDROCHLORIDE 0.4 MG: 0.4 CAPSULE ORAL at 10:01

## 2018-01-15 RX ADMIN — POTASSIUM CHLORIDE 10 MEQ: 10 INJECTION, SOLUTION INTRAVENOUS at 08:01

## 2018-01-15 RX ADMIN — LEVETIRACETAM 1000 MG: 100 SOLUTION ORAL at 10:01

## 2018-01-15 RX ADMIN — AMPICILLIN SODIUM AND SULBACTAM SODIUM 3 G: 2; 1 INJECTION, POWDER, FOR SOLUTION INTRAMUSCULAR; INTRAVENOUS at 10:01

## 2018-01-15 RX ADMIN — DONEPEZIL HYDROCHLORIDE 5 MG: 5 TABLET, FILM COATED ORAL at 09:01

## 2018-01-15 RX ADMIN — DANTROLENE SODIUM 25 MG: 25 CAPSULE ORAL at 10:01

## 2018-01-15 RX ADMIN — ENOXAPARIN SODIUM 40 MG: 100 INJECTION SUBCUTANEOUS at 05:01

## 2018-01-15 RX ADMIN — AMPICILLIN SODIUM AND SULBACTAM SODIUM 3 G: 2; 1 INJECTION, POWDER, FOR SOLUTION INTRAMUSCULAR; INTRAVENOUS at 03:01

## 2018-01-15 RX ADMIN — ASPIRIN 81 MG CHEWABLE TABLET 81 MG: 81 TABLET CHEWABLE at 10:01

## 2018-01-15 RX ADMIN — STANDARDIZED SENNA CONCENTRATE AND DOCUSATE SODIUM 1 TABLET: 8.6; 5 TABLET, FILM COATED ORAL at 10:01

## 2018-01-15 RX ADMIN — ATORVASTATIN CALCIUM 40 MG: 20 TABLET, FILM COATED ORAL at 10:01

## 2018-01-15 RX ADMIN — LEVOTHYROXINE SODIUM 75 MCG: 75 TABLET ORAL at 09:01

## 2018-01-15 RX ADMIN — BACLOFEN 5 MG: 10 TABLET ORAL at 03:01

## 2018-01-15 RX ADMIN — BACLOFEN 5 MG: 10 TABLET ORAL at 06:01

## 2018-01-15 RX ADMIN — AMPICILLIN SODIUM AND SULBACTAM SODIUM 3 G: 2; 1 INJECTION, POWDER, FOR SOLUTION INTRAMUSCULAR; INTRAVENOUS at 06:01

## 2018-01-15 RX ADMIN — MUPIROCIN: 20 OINTMENT TOPICAL at 10:01

## 2018-01-15 RX ADMIN — FAMOTIDINE 20 MG: 20 TABLET, FILM COATED ORAL at 10:01

## 2018-01-15 RX ADMIN — BETHANECHOL CHLORIDE 10 MG: 10 TABLET ORAL at 10:01

## 2018-01-15 RX ADMIN — FINASTERIDE 5 MG: 5 TABLET, FILM COATED ORAL at 10:01

## 2018-01-15 RX ADMIN — MINERAL OIL AND WHITE PETROLATUM: 150; 830 OINTMENT OPHTHALMIC at 10:01

## 2018-01-15 RX ADMIN — BETHANECHOL CHLORIDE 10 MG: 10 TABLET ORAL at 02:01

## 2018-01-15 RX ADMIN — POTASSIUM CHLORIDE 10 MEQ: 10 INJECTION, SOLUTION INTRAVENOUS at 10:01

## 2018-01-15 RX ADMIN — POLYETHYLENE GLYCOL 3350 17 G: 17 POWDER, FOR SOLUTION ORAL at 10:01

## 2018-01-15 RX ADMIN — DANTROLENE SODIUM 25 MG: 25 CAPSULE ORAL at 02:01

## 2018-01-15 NOTE — PROGRESS NOTES
[facility] Hospital Medicine  Progress Note    Patient Name: Enrique Yancey  MRN: [id number]  Patient Class: IP- Inpatient   Admission Date: 12/17/2017  Length of Stay: 29 days  Attending Physician: Odilon Reyes MD  Primary Care Provider: Ang Hassan MD    Hospital Medicine Team[place] [facility] [place] 1 Phu Regan MD    Subjective:     Principal Problem:Seizure    HPI:  Enrique Yancey is a 67 y.o. 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  He presented to outside ED with seizure activity for approximately 20 minutes. Per EMS, he was diaphoretic on arrival and tachycardic. Per nursing home, pt is nonverbal at baseline, but can focus on the speaker. He has a trach in place. He is being admitted to [facility] for a higher level of care.     Hospital Course:  12/17: Admit to [facility], EEG pending, Keppra 1 G  at OSH and 500 Q 12 started, CTH: No acute process   12/18: Increase keppra per epilepsy, sputum culture, trend procal  12/19: Treated for ESBL UTI and HAP (respiratory cultures growing Providencia/Proteus) with Zosyn for 7 days. Patient remained clinically stable during his hospital course and discharge to [facility] deferred secondary to high settings on trach collar  12/30: Stepped-down to hospital medicine 1  01/03: Awaiting placement.   01/04: No acute issues over night. Awaiting placement.   1/05: JOSÉ MIGUELON, TFs running at 40cc/hr increase to 45cc./hr, pt remains severely debilitated still pending re-evaluation by facilities that can provide for him w/ PEG & Trach, will initiate GOC discussion w/ daughter while he is inpatient, plan for discussion Sunday (1/7) morning    01/06: No acute events over night.     01/07: No acute issue over night. Goals of cared discussion with daughter tomorrow  "01/08/18. Still awaiting placement that will allow pt with trach and PEG tubes.     01/08 No acute events. Daughter stopped by for goals of discussion. Would like to make patient Full code, at least until he sees his granddaughter yet to be born. Has rejections from a couple of nursing homes per SW. Stays afebrile, HD stable.     01/08: Urine cx on 01/03 with pseudomonas. Repeat ucx to r/o ESBL, f/u results. Deferred not to treat pseudomonas for now, will repeat urine culture and if persistently positive, will consider treating. Most likely colonization. Still awaiting placement.     1/10: Patient with new onset fever overnight up to 101.9°F with associated hypotension and tachycardia.  Good response to fluid bolus. previous urine cultures show pseudomonas sensitive to Cipro ciprofloxacin started.  There is also evidence of tube feeds surrounding trach site concerning for aspiration, chest x-ray with some opacification of right middle lobe, patient started on 7 day course of Unasyn. BCx, UCx pending    01/11: Patient HD stable. Repeat UCx, no growth. Will remove contact precaution as pt has two negative cx for ESBL. Previous UCx with pseudomonas, on Cipro. On Unasyn for possible Aspiration PNA. He is otherwise stable. Plan to wean oxygen to 3L: Nursing home will not accept if oxygen requirement is above 3L.     01/12: No acute events over night. Satting in the high 90's with out oxygen requirement. Remains stable. Awaiting placement.     01/13: Patient remains HD stable. Still not requiring oxygen and sats well above the 90's. Awaiting placement.     1/14: ERVIN pt on last day of cipro for UTI, day 5/7 of unasyn, 97% O2 on RA, pending placement     01/15: No acute events over nigh. Completed 5 day course of Cipro for UTI. On Unasyn for aspiration PNA. Remains afebrile, sating well in the 90's on RA. Per nurse pt is not on oxygen "just flow".     Interval History: No acute events. Seems alert than usual today. HD " stable. Still not requiring oxygen.     Review of Systems   Unable to perform ROS: Acuity of condition     Objective:     Vital Signs (Most Recent):  Temp: 98.8 °F (37.1 °C) (01/15/18 0732)  Pulse: 93 (01/15/18 0732)  Resp: 20 (01/15/18 0732)  BP: 130/79 (01/15/18 0732)  SpO2: 95 % (01/15/18 0800) Vital Signs (24h Range):  Temp:  [98.4 °F (36.9 °C)-99 °F (37.2 °C)] 98.8 °F (37.1 °C)  Pulse:  [79-93] 93  Resp:  [18-20] 20  SpO2:  [94 %-98 %] 95 %  BP: (128-143)/(79-93) 130/79     Weight: 54 kg (119 lb 0.8 oz)  Body mass index is 19.81 kg/m².    Intake/Output Summary (Last 24 hours) at 01/15/18 0924  Last data filed at 01/15/18 0430   Gross per 24 hour   Intake             1236 ml   Output                0 ml   Net             1236 ml      Physical Exam   Constitutional: He appears cachectic.   More alert today, spontaneous eye opening to voice    HENT:   Head: Normocephalic and atraumatic.   Eyes: EOM are normal. Pupils are equal, round, and reactive to light.   Neck: Neck supple.   Cardiovascular: Normal rate and regular rhythm.    Pulmonary/Chest: Effort normal. No respiratory distress.   Trach noted    Abdominal: Soft.   PEG placed   Musculoskeletal:   B/l UE & LE contracted   Neurological:   Somnolent but does open eye to voice and stimuli.  Speech: non-verbal.  Does not follow commands.       Skin: Skin is warm and dry.   Psychiatric: Cognition and memory are impaired.       Significant Labs:   CBC:   Recent Labs  Lab 01/15/18  0503   WBC 6.77   HGB 9.4*   HCT 29.1*        CMP:   Recent Labs  Lab 01/15/18  0503      K 3.2*      CO2 21*   GLU 99   BUN 26*   CREATININE 1.2   CALCIUM 8.5*   ANIONGAP 6*   EGFRNONAA >60.0       Significant Imaging: I have reviewed all pertinent imaging results/findings within the past 24 hours.    Assessment/Plan:      * Seizure    - Patient without seizure activity since admission.  - Likely instigated by dehydration and infections (ESBL UTI and HAP - patient has  completed therapy).   -Completed 5 day course of Cipro for UTI. Repeat urine cx negative   - Continue levetiracetam 1 g by PEG BID.      Pharyngoesophageal dysphagia    - s/p CVA.  - Was Tolerating TF at goal rate of 45cc/hr, however rate decreased to trickle feeds on January 10 secondary to concern for aspiration with associated fever  -Treating with Unasyn for suspected aspiration PNA given fever and HD instability on 01/10/17, has been afebrile since, HD stable since, will finish 7 day course of treatment, day 5/7 today.       Debility      Pt remains severely debilitated still pending re-evaluation by facilities that can provide for him w/ PEG & Trach, GOC discussed with daughter who stated, would like patient full code for now.      Aphasia    - s/p CVA  -Expressive aphasia, unclear if pt has receptive aphasia, doesn't follow commands, opens eyes to voice.       CKD (chronic kidney disease) stage 3, GFR 30-59 ml/min    - Avoid nephrotic agents  - Renal function at baseline  - continue to monitor       Normocytic anemia    - Hemoglobin stable at ~10, without active bleeding.  - Likely component of anemia of chronic disease.       Benign prostatic hyperplasia with lower urinary tract symptoms    - Continue finasteride 5 mg per G tuve daily and tamsulosin 0.4 mg per G tube TID.   -Currently on Cipro (last day) for pseudomonas UTI, previously treated for ESBL E.col      Hypothyroidism    - Continue levothyroxine 75 mcg per G tube daily.      Cerebrovascular accident (CVA) due to thrombosis of left carotid artery    - Prior left MCA CVA in 2016 with residual right spastic hemiparesis, purportedly s/p CEA.   - See below.       Right spastic hemiparesis    - From previous L MCA CVA.  - Continue dantrolene 25 mg per G tube TID and baclofen 5 mg per G tube TID.     VTE Risk Mitigation         Ordered     enoxaparin injection 40 mg  Every 24 hours (non-standard times)     Route:  Subcutaneous        12/17/17 8126      Medium Risk of VTE  Once      12/17/17 0449     Place sequential compression device  Until discontinued      12/17/17 0449            Phu Regan MD  Department of Hospital Medicine   [facility]

## 2018-01-15 NOTE — ASSESSMENT & PLAN NOTE
- Patient without seizure activity since admission.  - Likely instigated by dehydration and infections (ESBL UTI and HAP - patient has completed therapy).   -Completed 5 day course of Cipro for UTI. Repeat urine cx negative   - Continue levetiracetam 1 g by PEG BID.

## 2018-01-15 NOTE — PLAN OF CARE
Problem: Fall Risk (Adult)  Goal: Identify Related Risk Factors and Signs and Symptoms  Related risk factors and signs and symptoms are identified upon initiation of Human Response Clinical Practice Guideline (CPG)   Outcome: Ongoing (interventions implemented as appropriate)   01/15/18 2644   Fall Risk   Related Risk Factors (Fall Risk) bladder function altered;confusion/agitation;culprit medication(s);depression/anxiety;fatigue/slow reaction;gait/mobility problems;history of falls;inadequate lighting;polypharmacy;sensory deficits   Signs and Symptoms (Fall Risk) presence of risk factors

## 2018-01-15 NOTE — ASSESSMENT & PLAN NOTE
- s/p CVA.  - Was Tolerating TF at goal rate of 45cc/hr, however rate decreased to trickle feeds on January 10 secondary to concern for aspiration with associated fever  -Treating with Unasyn for suspected aspiration PNA given fever and HD instability on 01/10/17, has been afebrile since, HD stable since, will finish 7 day course of treatment, day 5/7 today.

## 2018-01-15 NOTE — PLAN OF CARE
Problem: Patient Care Overview  Goal: Plan of Care Review  Outcome: Ongoing (interventions implemented as appropriate)   01/15/18 5371   Coping/Psychosocial   Plan Of Care Reviewed With patient

## 2018-01-15 NOTE — PLAN OF CARE
This  sent updated clinicals to [facility] and left message to follow up on referral, [phone].  This  also sent clinical updates to [facility], and spoke to Aidan in Admissions [phone].  Aidan advised that she has just returned from maternity leave and will follow up with this  after she's had the opportunity to review clinicals.    Cara Hart LMSW

## 2018-01-15 NOTE — SUBJECTIVE & OBJECTIVE
Interval History: No acute events. Seems alert than usual today. HD stable. Still not requiring oxygen.     Review of Systems   Unable to perform ROS: Acuity of condition     Objective:     Vital Signs (Most Recent):  Temp: 98.8 °F (37.1 °C) (01/15/18 0732)  Pulse: 93 (01/15/18 0732)  Resp: 20 (01/15/18 0732)  BP: 130/79 (01/15/18 0732)  SpO2: 95 % (01/15/18 0800) Vital Signs (24h Range):  Temp:  [98.4 °F (36.9 °C)-99 °F (37.2 °C)] 98.8 °F (37.1 °C)  Pulse:  [79-93] 93  Resp:  [18-20] 20  SpO2:  [94 %-98 %] 95 %  BP: (128-143)/(79-93) 130/79     Weight: 54 kg (119 lb 0.8 oz)  Body mass index is 19.81 kg/m².    Intake/Output Summary (Last 24 hours) at 01/15/18 0924  Last data filed at 01/15/18 0430   Gross per 24 hour   Intake             1236 ml   Output                0 ml   Net             1236 ml      Physical Exam   Constitutional: He appears cachectic.   More alert today, spontaneous eye opening to voice    HENT:   Head: Normocephalic and atraumatic.   Eyes: EOM are normal. Pupils are equal, round, and reactive to light.   Neck: Neck supple.   Cardiovascular: Normal rate and regular rhythm.    Pulmonary/Chest: Effort normal. No respiratory distress.   Trach noted    Abdominal: Soft.   PEG placed   Musculoskeletal:   B/l UE & LE contracted   Neurological:   Somnolent but does open eye to voice and stimuli.  Speech: non-verbal.  Does not follow commands.       Skin: Skin is warm and dry.   Psychiatric: Cognition and memory are impaired.       Significant Labs:   CBC:   Recent Labs  Lab 01/15/18  0503   WBC 6.77   HGB 9.4*   HCT 29.1*        CMP:   Recent Labs  Lab 01/15/18  0503      K 3.2*      CO2 21*   GLU 99   BUN 26*   CREATININE 1.2   CALCIUM 8.5*   ANIONGAP 6*   EGFRNONAA >60.0       Significant Imaging: I have reviewed all pertinent imaging results/findings within the past 24 hours.

## 2018-01-15 NOTE — PLAN OF CARE
Problem: Pressure Ulcer Risk (Wing Scale) (Adult,Obstetrics,Pediatric)  Goal: Identify Related Risk Factors and Signs and Symptoms  Related risk factors and signs and symptoms are identified upon initiation of Human Response Clinical Practice Guideline (CPG)   Outcome: Ongoing (interventions implemented as appropriate)   01/15/18 9330   Pressure Ulcer Risk (Wing Scale)   Related Risk Factors (Pressure Ulcer Risk (Wing Scale)) cognitive impairment;excretions/secretions;fluid intake inadequate;hospitalization prolonged;mobility impaired;skeletal deformities

## 2018-01-16 PROBLEM — E87.6 HYPOKALEMIA: Status: ACTIVE | Noted: 2018-01-16

## 2018-01-16 LAB
POCT GLUCOSE: 101 MG/DL (ref 70–110)
POCT GLUCOSE: 103 MG/DL (ref 70–110)
POCT GLUCOSE: 107 MG/DL (ref 70–110)
POCT GLUCOSE: 112 MG/DL (ref 70–110)
POCT GLUCOSE: 153 MG/DL (ref 70–110)

## 2018-01-16 PROCEDURE — 99232 SBSQ HOSP IP/OBS MODERATE 35: CPT | Mod: GC,,, | Performed by: HOSPITALIST

## 2018-01-16 PROCEDURE — 25000003 PHARM REV CODE 250: Performed by: PHYSICIAN ASSISTANT

## 2018-01-16 PROCEDURE — 63600175 PHARM REV CODE 636 W HCPCS: Performed by: PHYSICIAN ASSISTANT

## 2018-01-16 PROCEDURE — 94761 N-INVAS EAR/PLS OXIMETRY MLT: CPT

## 2018-01-16 PROCEDURE — 25000003 PHARM REV CODE 250: Performed by: NURSE PRACTITIONER

## 2018-01-16 PROCEDURE — 25000003 PHARM REV CODE 250: Performed by: HOSPITALIST

## 2018-01-16 PROCEDURE — 99900026 HC AIRWAY MAINTENANCE (STAT)

## 2018-01-16 PROCEDURE — 25000003 PHARM REV CODE 250: Performed by: STUDENT IN AN ORGANIZED HEALTH CARE EDUCATION/TRAINING PROGRAM

## 2018-01-16 PROCEDURE — 80177 DRUG SCRN QUAN LEVETIRACETAM: CPT

## 2018-01-16 PROCEDURE — 63600175 PHARM REV CODE 636 W HCPCS: Performed by: STUDENT IN AN ORGANIZED HEALTH CARE EDUCATION/TRAINING PROGRAM

## 2018-01-16 PROCEDURE — 11000001 HC ACUTE MED/SURG PRIVATE ROOM

## 2018-01-16 PROCEDURE — 27000221 HC OXYGEN, UP TO 24 HOURS

## 2018-01-16 PROCEDURE — 25000003 PHARM REV CODE 250: Performed by: PSYCHIATRY & NEUROLOGY

## 2018-01-16 RX ORDER — DANTROLENE SODIUM 25 MG/1
25 CAPSULE ORAL 2 TIMES DAILY
Status: DISCONTINUED | OUTPATIENT
Start: 2018-01-16 | End: 2018-01-17

## 2018-01-16 RX ADMIN — TAMSULOSIN HYDROCHLORIDE 0.4 MG: 0.4 CAPSULE ORAL at 09:01

## 2018-01-16 RX ADMIN — DANTROLENE SODIUM 25 MG: 25 CAPSULE ORAL at 06:01

## 2018-01-16 RX ADMIN — FINASTERIDE 5 MG: 5 TABLET, FILM COATED ORAL at 08:01

## 2018-01-16 RX ADMIN — LEVOTHYROXINE SODIUM 75 MCG: 75 TABLET ORAL at 08:01

## 2018-01-16 RX ADMIN — BACLOFEN 5 MG: 10 TABLET ORAL at 01:01

## 2018-01-16 RX ADMIN — STANDARDIZED SENNA CONCENTRATE AND DOCUSATE SODIUM 1 TABLET: 8.6; 5 TABLET, FILM COATED ORAL at 08:01

## 2018-01-16 RX ADMIN — TAMSULOSIN HYDROCHLORIDE 0.4 MG: 0.4 CAPSULE ORAL at 08:01

## 2018-01-16 RX ADMIN — BACLOFEN 5 MG: 10 TABLET ORAL at 09:01

## 2018-01-16 RX ADMIN — MUPIROCIN: 20 OINTMENT TOPICAL at 08:01

## 2018-01-16 RX ADMIN — ATORVASTATIN CALCIUM 40 MG: 20 TABLET, FILM COATED ORAL at 08:01

## 2018-01-16 RX ADMIN — BETHANECHOL CHLORIDE 10 MG: 10 TABLET ORAL at 06:01

## 2018-01-16 RX ADMIN — FAMOTIDINE 20 MG: 20 TABLET, FILM COATED ORAL at 08:01

## 2018-01-16 RX ADMIN — BETHANECHOL CHLORIDE 10 MG: 10 TABLET ORAL at 01:01

## 2018-01-16 RX ADMIN — DONEPEZIL HYDROCHLORIDE 5 MG: 5 TABLET, FILM COATED ORAL at 08:01

## 2018-01-16 RX ADMIN — DANTROLENE SODIUM 25 MG: 25 CAPSULE ORAL at 09:01

## 2018-01-16 RX ADMIN — LEVETIRACETAM 1000 MG: 100 SOLUTION ORAL at 08:01

## 2018-01-16 RX ADMIN — AMPICILLIN SODIUM AND SULBACTAM SODIUM 3 G: 2; 1 INJECTION, POWDER, FOR SOLUTION INTRAMUSCULAR; INTRAVENOUS at 06:01

## 2018-01-16 RX ADMIN — LEVETIRACETAM 1000 MG: 100 SOLUTION ORAL at 10:01

## 2018-01-16 RX ADMIN — AMPICILLIN SODIUM AND SULBACTAM SODIUM 3 G: 2; 1 INJECTION, POWDER, FOR SOLUTION INTRAMUSCULAR; INTRAVENOUS at 04:01

## 2018-01-16 RX ADMIN — MUPIROCIN: 20 OINTMENT TOPICAL at 09:01

## 2018-01-16 RX ADMIN — AMPICILLIN SODIUM AND SULBACTAM SODIUM 3 G: 2; 1 INJECTION, POWDER, FOR SOLUTION INTRAMUSCULAR; INTRAVENOUS at 11:01

## 2018-01-16 RX ADMIN — BACLOFEN 5 MG: 10 TABLET ORAL at 06:01

## 2018-01-16 RX ADMIN — FLUOXETINE 20 MG: 20 CAPSULE ORAL at 08:01

## 2018-01-16 RX ADMIN — ASPIRIN 81 MG CHEWABLE TABLET 81 MG: 81 TABLET CHEWABLE at 08:01

## 2018-01-16 RX ADMIN — BETHANECHOL CHLORIDE 10 MG: 10 TABLET ORAL at 10:01

## 2018-01-16 RX ADMIN — MINERAL OIL AND WHITE PETROLATUM: 150; 830 OINTMENT OPHTHALMIC at 09:01

## 2018-01-16 RX ADMIN — POLYETHYLENE GLYCOL 3350 17 G: 17 POWDER, FOR SOLUTION ORAL at 08:01

## 2018-01-16 RX ADMIN — VITAMIN C 1 TABLET: TAB at 08:01

## 2018-01-16 RX ADMIN — ENOXAPARIN SODIUM 40 MG: 100 INJECTION SUBCUTANEOUS at 05:01

## 2018-01-16 NOTE — ASSESSMENT & PLAN NOTE
- Patient without seizure activity since admission.  - Likely instigated by dehydration and infections (ESBL UTI and HAP - patient has completed therapy).   -Completed 5 day course of Cipro for UTI, possible precipitant. Repeat urine cx negative   - Continue levetiracetam 1 g by PEG BID.

## 2018-01-16 NOTE — PLAN OF CARE
01/16/18 1615   Discharge Reassessment   Assessment Type Discharge Planning Reassessment   Provided patient/caregiver education on the expected discharge date and the discharge plan Yes   Do you have any problems affording any of your prescribed medications? No   Discharge Plan A New Nursing Home placement - halfway care facility   Discharge Plan B Inpatient Hospice   Patient choice form signed by patient/caregiver No   Can the patient answer the patient profile reliably? No, cognitively impaired   Describe the patient's ability to walk at the present time. Does not walk or unable to take any steps at all   How often would a person be available to care for the patient? Often   Number of comorbid conditions (as recorded on the chart) Five or more

## 2018-01-16 NOTE — SUBJECTIVE & OBJECTIVE
Interval History: Awaiting placement.     Review of Systems   Unable to perform ROS: Acuity of condition     Objective:     Vital Signs (Most Recent):  Temp: 98.5 °F (36.9 °C) (01/16/18 0736)  Pulse: 78 (01/16/18 1038)  Resp: 20 (01/16/18 1038)  BP: 138/81 (01/16/18 0736)  SpO2: 97 % (01/16/18 1038) Vital Signs (24h Range):  Temp:  [98.3 °F (36.8 °C)-100.1 °F (37.8 °C)] 98.5 °F (36.9 °C)  Pulse:  [] 78  Resp:  [16-20] 20  SpO2:  [85 %-98 %] 97 %  BP: (138-156)/(75-91) 138/81     Weight: 54 kg (119 lb 0.8 oz)  Body mass index is 19.81 kg/m².    Intake/Output Summary (Last 24 hours) at 01/16/18 1100  Last data filed at 01/16/18 0448   Gross per 24 hour   Intake             1660 ml   Output                0 ml   Net             1660 ml      Physical Exam   Constitutional: He appears cachectic.   Less responsive/alert today   HENT:   Head: Normocephalic and atraumatic.   Eyes: EOM are normal. Pupils are equal, round, and reactive to light.   Neck: Neck supple.   Cardiovascular: Normal rate and regular rhythm.    Pulmonary/Chest: Effort normal. No respiratory distress.   Trach noted    Abdominal: Soft.   PEG placed   Musculoskeletal:   B/l UE & LE contracted   Neurological:   Somnolent but does open eye to voice and stimuli.  Speech: non-verbal.  Does not follow commands.       Skin: Skin is warm and dry.   Psychiatric: Cognition and memory are impaired.       Significant Labs:   CBC:   Recent Labs  Lab 01/15/18  0503   WBC 6.77   HGB 9.4*   HCT 29.1*        CMP:   Recent Labs  Lab 01/15/18  0503      K 3.2*      CO2 21*   GLU 99   BUN 26*   CREATININE 1.2   CALCIUM 8.5*   ANIONGAP 6*   EGFRNONAA >60.0       Significant Imaging: I have reviewed all pertinent imaging results/findings within the past 24 hours.

## 2018-01-16 NOTE — PLAN OF CARE
Problem: Patient Care Overview  Goal: Plan of Care Review  Outcome: Ongoing (interventions implemented as appropriate)  Reviewed plan of care, patient to complete IV abx therapy for possible aspiration PNA and UTI then d/c to nursing home. Patient with Tmax 100.1 axillary overnight - no hypotension/tachycardia. Patient on medical air 5LPM via trach collar and tolerating well with sats in mid 90s, trach suction performed x 2 - moderate amount of thick clear/white secretions obtained, pt tolerated well. Oral care and suction provided as well, same thick clear/white secretions obtained. Patient repositioned throughout night, some difficulty with contractures - bony prominences padded with pillows, mepilex border applied over blisters to right buttox. Patient tolerating treatment well, no adverse events, see flowsheets for detailed assessment information.

## 2018-01-16 NOTE — PT/OT/SLP PROGRESS
Occupational Therapy      Patient Name:  Enrique Yancey   MRN:  [id number]    Patient is familiar to this OT and is inappropriate for therapy as he is at baseline re self care and mobility; pt is severely contracted and unable to demo understanding of tasks. Pt lives in a NH and is cared for. OT acknowledges and will d/c orders.    MAGGIE Dawkins  1/16/2018  Pager: [phone]

## 2018-01-16 NOTE — ASSESSMENT & PLAN NOTE
- s/p CVA.  - Was Tolerating TF at goal rate of 45cc/hr, however rate decreased to trickle feeds on January 10 secondary to concern for aspiration with associated fever  -Treating with Unasyn for suspected aspiration PNA given fever and HD instability on 01/10/17, has been afebrile since, HD stable since, will finish 7 day course of treatment, day 6/7 today.

## 2018-01-16 NOTE — PROGRESS NOTES
[facility] Hospital Medicine  Progress Note    Patient Name: Enrique Yancey  MRN: [id number]  Patient Class: IP- Inpatient   Admission Date: 12/17/2017  Length of Stay: 30 days  Attending Physician: Bryson Villavicencio MD  Primary Care Provider: Ang Hassan MD    Hospital Medicine Team: [facility] 1 Phu Regan MD    Subjective:     Principal Problem:Seizure    HPI:  Enrique Yancey is a 67 y.o. 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  He presented to outside ED with seizure activity for approximately 20 minutes. Per EMS, he was diaphoretic on arrival and tachycardic. Per nursing home, pt is nonverbal at baseline, but can focus on the speaker. He has a trach in place. He is being admitted to [facility] for a higher level of care.     Hospital Course:  12/17: Admit to [facility], EEG pending, Keppra 1 G  at OSH and 500 Q 12 started, CTH: No acute process   12/18: Increase keppra per epilepsy, sputum culture, trend procal  12/19: Treated for ESBL UTI and HAP (respiratory cultures growing Providencia/Proteus) with Zosyn for 7 days. Patient remained clinically stable during his hospital course and discharge to [facility] deferred secondary to high settings on trach collar  12/30: Stepped-down to hospital medicine 1  01/03: Awaiting placement.   01/04: No acute issues over night. Awaiting placement.   1/05: NAEON, TFs running at 40cc/hr increase to 45cc./hr, pt remains severely debilitated still pending re-evaluation by facilities that can provide for him w/ PEG & Trach, will initiate GOC discussion w/ daughter while he is inpatient, plan for discussion Sunday (1/7) morning    01/06: No acute events over night.     01/07: No acute issue over night. Goals of cared discussion with daughter tomorrow  "01/08/18. Still awaiting placement that will allow pt with trach and PEG tubes.     01/08 No acute events. Daughter stopped by for goals of discussion. Would like to make patient Full code, at least until he sees his granddaughter yet to be born. Has rejections from a couple of nursing homes per SW. Stays afebrile, HD stable.     01/08: Urine cx on 01/03 with pseudomonas. Repeat ucx to r/o ESBL, f/u results. Deferred not to treat pseudomonas for now, will repeat urine culture and if persistently positive, will consider treating. Most likely colonization. Still awaiting placement.     1/10: Patient with new onset fever overnight up to 101.9°F with associated hypotension and tachycardia.  Good response to fluid bolus. previous urine cultures show pseudomonas sensitive to Cipro ciprofloxacin started.  There is also evidence of tube feeds surrounding trach site concerning for aspiration, chest x-ray with some opacification of right middle lobe, patient started on 7 day course of Unasyn. BCx, UCx pending    01/11: Patient HD stable. Repeat UCx, no growth. Will remove contact precaution as pt has two negative cx for ESBL. Previous UCx with pseudomonas, on Cipro. On Unasyn for possible Aspiration PNA. He is otherwise stable. Plan to wean oxygen to 3L: Nursing home will not accept if oxygen requirement is above 3L.     01/12: No acute events over night. Satting in the high 90's with out oxygen requirement. Remains stable. Awaiting placement.     01/13: Patient remains HD stable. Still not requiring oxygen and sats well above the 90's. Awaiting placement.     1/14: ERVIN pt on last day of cipro for UTI, day 5/7 of unasyn, 97% O2 on RA, pending placement     01/15: No acute events over nigh. Completed 5 day course of Cipro for UTI. On Unasyn for aspiration PNA. Remains afebrile, sating well in the 90's on RA. Per nurse pt is not on oxygen "just flow".     01/16: No acute events over nights. Still awaiting placement. Remains " HD stable.     Interval History: Awaiting placement.     Review of Systems   Unable to perform ROS: Acuity of condition     Objective:     Vital Signs (Most Recent):  Temp: 98.5 °F (36.9 °C) (01/16/18 0736)  Pulse: 78 (01/16/18 1038)  Resp: 20 (01/16/18 1038)  BP: 138/81 (01/16/18 0736)  SpO2: 97 % (01/16/18 1038) Vital Signs (24h Range):  Temp:  [98.3 °F (36.8 °C)-100.1 °F (37.8 °C)] 98.5 °F (36.9 °C)  Pulse:  [] 78  Resp:  [16-20] 20  SpO2:  [85 %-98 %] 97 %  BP: (138-156)/(75-91) 138/81     Weight: 54 kg (119 lb 0.8 oz)  Body mass index is 19.81 kg/m².    Intake/Output Summary (Last 24 hours) at 01/16/18 1100  Last data filed at 01/16/18 0448   Gross per 24 hour   Intake             1660 ml   Output                0 ml   Net             1660 ml      Physical Exam   Constitutional: He appears cachectic.   Less responsive/alert today   HENT:   Head: Normocephalic and atraumatic.   Eyes: EOM are normal. Pupils are equal, round, and reactive to light.   Neck: Neck supple.   Cardiovascular: Normal rate and regular rhythm.    Pulmonary/Chest: Effort normal. No respiratory distress.   Trach noted    Abdominal: Soft.   PEG placed   Musculoskeletal:   B/l UE & LE contracted   Neurological:   Somnolent but does open eye to voice and stimuli.  Speech: non-verbal.  Does not follow commands.       Skin: Skin is warm and dry.   Psychiatric: Cognition and memory are impaired.       Significant Labs:   CBC:   Recent Labs  Lab 01/15/18  0503   WBC 6.77   HGB 9.4*   HCT 29.1*        CMP:   Recent Labs  Lab 01/15/18  0503      K 3.2*      CO2 21*   GLU 99   BUN 26*   CREATININE 1.2   CALCIUM 8.5*   ANIONGAP 6*   EGFRNONAA >60.0       Significant Imaging: I have reviewed all pertinent imaging results/findings within the past 24 hours.    Assessment/Plan:      * Seizure    - Patient without seizure activity since admission.  - Likely instigated by dehydration and infections (ESBL UTI and HAP - patient has  completed therapy).   -Completed 5 day course of Cipro for UTI, possible precipitant. Repeat urine cx negative   - Continue levetiracetam 1 g by PEG BID.      Pharyngoesophageal dysphagia    - s/p CVA.  - Was Tolerating TF at goal rate of 45cc/hr, however rate decreased to trickle feeds on January 10 secondary to concern for aspiration with associated fever  -Treating with Unasyn for suspected aspiration PNA given fever and HD instability on 01/10/17, has been afebrile since, HD stable since, will finish 7 day course of treatment, day 6/7 today.   -TF rate increased to 45 ml/hr today      Debility      Pt remains severely debilitated still pending re-evaluation by facilities that can provide for him w/ PEG & Trach, GOC discussed with daughter who stated, would like patient full code for now.      Aphasia    - s/p CVA  -Expressive aphasia, unclear if pt has receptive aphasia, doesn't follow commands, opens eyes to voice.       Normocytic anemia    - Hemoglobin stable at ~10, without active bleeding.  - Likely component of anemia of chronic disease.       Benign prostatic hyperplasia with lower urinary tract symptoms    - Continue finasteride 5 mg per G tuve daily and tamsulosin 0.4 mg per G tube TID.   -completed 5 day course of cipro  for pseudomonas UTI, previously treated for ESBL E.col      Hypothyroidism    - Continue levothyroxine 75 mcg per G tube daily.      Cerebrovascular accident (CVA) due to thrombosis of left carotid artery    - Prior left MCA CVA in 2016 with residual right spastic hemiparesis, purportedly s/p CEA.   - See below.       Right spastic hemiparesis    - From previous L MCA CVA.  - Continue dantrolene 25 mg per G tube TID and baclofen 5 mg per G tube TID.        VTE Risk Mitigation         Ordered     enoxaparin injection 40 mg  Every 24 hours (non-standard times)     Route:  Subcutaneous        12/17/17 1131     Medium Risk of VTE  Once      12/17/17 0449     Place sequential compression  device  Until discontinued      12/17/17 0449          Phu Regan MD  Department of Hospital Medicine   [facility]

## 2018-01-16 NOTE — ASSESSMENT & PLAN NOTE
- Continue finasteride 5 mg per G tuve daily and tamsulosin 0.4 mg per G tube TID.   -completed 5 day course of cipro  for pseudomonas UTI, previously treated for ESBL E.col

## 2018-01-17 PROBLEM — E87.6 HYPOKALEMIA: Status: RESOLVED | Noted: 2018-01-16 | Resolved: 2018-01-17

## 2018-01-17 PROBLEM — G92.9 TOXIC ENCEPHALOPATHY: Status: RESOLVED | Noted: 2017-12-17 | Resolved: 2018-01-17

## 2018-01-17 PROBLEM — R94.6 ABNORMAL THYROID FUNCTION TEST: Status: RESOLVED | Noted: 2017-01-03 | Resolved: 2018-01-17

## 2018-01-17 LAB
ANION GAP SERPL CALC-SCNC: 7 MMOL/L
BASOPHILS # BLD AUTO: 0.02 K/UL
BASOPHILS NFR BLD: 0.3 %
BUN SERPL-MCNC: 28 MG/DL
CALCIUM SERPL-MCNC: 8.5 MG/DL
CHLORIDE SERPL-SCNC: 110 MMOL/L
CO2 SERPL-SCNC: 20 MMOL/L
CREAT SERPL-MCNC: 1.1 MG/DL
DIFFERENTIAL METHOD: ABNORMAL
EOSINOPHIL # BLD AUTO: 0.5 K/UL
EOSINOPHIL NFR BLD: 6.7 %
ERYTHROCYTE [DISTWIDTH] IN BLOOD BY AUTOMATED COUNT: 15.8 %
EST. GFR  (AFRICAN AMERICAN): >60 ML/MIN/1.73 M^2
EST. GFR  (NON AFRICAN AMERICAN): >60 ML/MIN/1.73 M^2
GLUCOSE SERPL-MCNC: 96 MG/DL
HCT VFR BLD AUTO: 30.3 %
HGB BLD-MCNC: 9.8 G/DL
IMM GRANULOCYTES # BLD AUTO: 0.05 K/UL
IMM GRANULOCYTES NFR BLD AUTO: 0.7 %
LEVETIRACETAM SERPL-MCNC: 54.1 UG/ML (ref 3–60)
LYMPHOCYTES # BLD AUTO: 1.1 K/UL
LYMPHOCYTES NFR BLD: 16.3 %
MAGNESIUM SERPL-MCNC: 1.8 MG/DL
MCH RBC QN AUTO: 30.5 PG
MCHC RBC AUTO-ENTMCNC: 32.3 G/DL
MCV RBC AUTO: 94 FL
MONOCYTES # BLD AUTO: 0.7 K/UL
MONOCYTES NFR BLD: 9.9 %
NEUTROPHILS # BLD AUTO: 4.5 K/UL
NEUTROPHILS NFR BLD: 66.1 %
NRBC BLD-RTO: 0 /100 WBC
PHOSPHATE SERPL-MCNC: 3.2 MG/DL
PLATELET # BLD AUTO: 202 K/UL
PMV BLD AUTO: 11 FL
POCT GLUCOSE: 105 MG/DL (ref 70–110)
POCT GLUCOSE: 115 MG/DL (ref 70–110)
POTASSIUM SERPL-SCNC: 3.8 MMOL/L
RBC # BLD AUTO: 3.21 M/UL
SODIUM SERPL-SCNC: 137 MMOL/L
WBC # BLD AUTO: 6.86 K/UL

## 2018-01-17 PROCEDURE — 84100 ASSAY OF PHOSPHORUS: CPT

## 2018-01-17 PROCEDURE — 80048 BASIC METABOLIC PNL TOTAL CA: CPT

## 2018-01-17 PROCEDURE — 25000003 PHARM REV CODE 250: Performed by: PHYSICIAN ASSISTANT

## 2018-01-17 PROCEDURE — 25000003 PHARM REV CODE 250: Performed by: PSYCHIATRY & NEUROLOGY

## 2018-01-17 PROCEDURE — 83735 ASSAY OF MAGNESIUM: CPT

## 2018-01-17 PROCEDURE — 25000003 PHARM REV CODE 250: Performed by: STUDENT IN AN ORGANIZED HEALTH CARE EDUCATION/TRAINING PROGRAM

## 2018-01-17 PROCEDURE — 99900026 HC AIRWAY MAINTENANCE (STAT)

## 2018-01-17 PROCEDURE — 25000003 PHARM REV CODE 250: Performed by: HOSPITALIST

## 2018-01-17 PROCEDURE — 99232 SBSQ HOSP IP/OBS MODERATE 35: CPT | Mod: GC,,, | Performed by: HOSPITALIST

## 2018-01-17 PROCEDURE — 27000221 HC OXYGEN, UP TO 24 HOURS

## 2018-01-17 PROCEDURE — 63600175 PHARM REV CODE 636 W HCPCS: Performed by: PHYSICIAN ASSISTANT

## 2018-01-17 PROCEDURE — 94761 N-INVAS EAR/PLS OXIMETRY MLT: CPT

## 2018-01-17 PROCEDURE — 36415 COLL VENOUS BLD VENIPUNCTURE: CPT

## 2018-01-17 PROCEDURE — 25000003 PHARM REV CODE 250: Performed by: NURSE PRACTITIONER

## 2018-01-17 PROCEDURE — 11000001 HC ACUTE MED/SURG PRIVATE ROOM

## 2018-01-17 PROCEDURE — 85025 COMPLETE CBC W/AUTO DIFF WBC: CPT

## 2018-01-17 RX ADMIN — DONEPEZIL HYDROCHLORIDE 5 MG: 5 TABLET, FILM COATED ORAL at 08:01

## 2018-01-17 RX ADMIN — LEVOTHYROXINE SODIUM 75 MCG: 75 TABLET ORAL at 08:01

## 2018-01-17 RX ADMIN — TAMSULOSIN HYDROCHLORIDE 0.4 MG: 0.4 CAPSULE ORAL at 09:01

## 2018-01-17 RX ADMIN — ATORVASTATIN CALCIUM 40 MG: 20 TABLET, FILM COATED ORAL at 08:01

## 2018-01-17 RX ADMIN — FINASTERIDE 5 MG: 5 TABLET, FILM COATED ORAL at 08:01

## 2018-01-17 RX ADMIN — BETHANECHOL CHLORIDE 10 MG: 10 TABLET ORAL at 05:01

## 2018-01-17 RX ADMIN — LEVETIRACETAM 1000 MG: 100 SOLUTION ORAL at 09:01

## 2018-01-17 RX ADMIN — BETHANECHOL CHLORIDE 10 MG: 10 TABLET ORAL at 01:01

## 2018-01-17 RX ADMIN — MUPIROCIN: 20 OINTMENT TOPICAL at 08:01

## 2018-01-17 RX ADMIN — VITAMIN C 1 TABLET: TAB at 08:01

## 2018-01-17 RX ADMIN — FAMOTIDINE 20 MG: 20 TABLET, FILM COATED ORAL at 08:01

## 2018-01-17 RX ADMIN — ENOXAPARIN SODIUM 40 MG: 100 INJECTION SUBCUTANEOUS at 05:01

## 2018-01-17 RX ADMIN — MINERAL OIL AND WHITE PETROLATUM: 150; 830 OINTMENT OPHTHALMIC at 09:01

## 2018-01-17 RX ADMIN — LEVETIRACETAM 1000 MG: 100 SOLUTION ORAL at 08:01

## 2018-01-17 RX ADMIN — TAMSULOSIN HYDROCHLORIDE 0.4 MG: 0.4 CAPSULE ORAL at 08:01

## 2018-01-17 RX ADMIN — POLYETHYLENE GLYCOL 3350 17 G: 17 POWDER, FOR SOLUTION ORAL at 08:01

## 2018-01-17 RX ADMIN — BACLOFEN 5 MG: 10 TABLET ORAL at 05:01

## 2018-01-17 RX ADMIN — BETHANECHOL CHLORIDE 10 MG: 10 TABLET ORAL at 09:01

## 2018-01-17 RX ADMIN — STANDARDIZED SENNA CONCENTRATE AND DOCUSATE SODIUM 1 TABLET: 8.6; 5 TABLET, FILM COATED ORAL at 08:01

## 2018-01-17 RX ADMIN — MUPIROCIN: 20 OINTMENT TOPICAL at 09:01

## 2018-01-17 RX ADMIN — DANTROLENE SODIUM 25 MG: 25 CAPSULE ORAL at 08:01

## 2018-01-17 RX ADMIN — ASPIRIN 81 MG CHEWABLE TABLET 81 MG: 81 TABLET CHEWABLE at 08:01

## 2018-01-17 NOTE — PLAN OF CARE
CHANDLER called [facility]-admissions unavailable today, chandler asked to speak with someone else, sw was told there is no one else to speak with regarding a referral. SW will f/u tomorrow with admissions.    CHANDLER spoke with Lynne at [facility]-referral was received but has not been reviewed. Lynne will review the referral and asked that chandler call her tomorrow.    Alexandra Banegas, [facility] [phone]

## 2018-01-17 NOTE — SUBJECTIVE & OBJECTIVE
Interval History: NAEON.     Review of Systems   Unable to perform ROS: Acuity of condition     Objective:     Vital Signs (Most Recent):  Temp: 98.5 °F (36.9 °C) (01/17/18 1602)  Pulse: 68 (01/17/18 1602)  Resp: 18 (01/17/18 1602)  BP: (!) 156/85 (01/17/18 1602)  SpO2: 95 % (01/17/18 1602) Vital Signs (24h Range):  Temp:  [97.1 °F (36.2 °C)-99 °F (37.2 °C)] 98.5 °F (36.9 °C)  Pulse:  [68-81] 68  Resp:  [16-20] 18  SpO2:  [93 %-99 %] 95 %  BP: (124-156)/(74-85) 156/85     Weight: 54 kg (119 lb 0.8 oz)  Body mass index is 19.81 kg/m².    Intake/Output Summary (Last 24 hours) at 01/17/18 1648  Last data filed at 01/17/18 1126   Gross per 24 hour   Intake             1690 ml   Output                0 ml   Net             1690 ml      Physical Exam   Constitutional: He appears cachectic.   Less responsive/alert today   HENT:   Head: Normocephalic and atraumatic.   Eyes: EOM are normal. Pupils are equal, round, and reactive to light.   Neck: Neck supple.   Cardiovascular: Normal rate and regular rhythm.    Pulmonary/Chest: Effort normal. No respiratory distress.   Trach noted    Abdominal: Soft.   PEG placed   Musculoskeletal:   B/l UE & LE contracted   Neurological:   Somnolent but does open eye to voice and stimuli.  Speech: non-verbal.  Does not follow commands.       Skin: Skin is warm and dry.   Psychiatric: Cognition and memory are impaired.       Significant Labs:   CBC:   Recent Labs  Lab 01/17/18  0446   WBC 6.86   HGB 9.8*   HCT 30.3*        CMP:   Recent Labs  Lab 01/17/18  0446      K 3.8      CO2 20*   GLU 96   BUN 28*   CREATININE 1.1   CALCIUM 8.5*   ANIONGAP 7*   EGFRNONAA >60.0       Significant Imaging: I have reviewed all pertinent imaging results/findings within the past 24 hours.

## 2018-01-17 NOTE — ASSESSMENT & PLAN NOTE
- s/p CVA.  - Was Tolerating TF at goal rate of 45cc/hr, however rate decreased to trickle feeds on January 10 secondary to concern for aspiration with associated fever  -Treating with Unasyn for suspected aspiration PNA given fever and HD instability on 01/10/17, has been afebrile since, HD stable since, will finish 7 day course of treatment, day 7/7 today.

## 2018-01-17 NOTE — PLAN OF CARE
Problem: Patient Care Overview  Goal: Plan of Care Review  Outcome: Ongoing (interventions implemented as appropriate)  Pt responsive to voice and touch, non-verbally, unable to make his needs known. O2 5L via trach in progress. Suctioned and trach care as needed. No SOB noted. Continuous feeding Isosource 1.5 @ 45ml / hr in progress via PEG tube with well-tolerated. Aspiration precaution and elevated HOB maintained. All due medication administered with tolerated. No seizure activities observed. Kept dry and comfortable. Turned and repositioned Q2hr. All needs anticipated.

## 2018-01-17 NOTE — PROGRESS NOTES
[facility] Hospital Medicine  Progress Note    Patient Name: Enrique Yancey  MRN: [id number]  Patient Class: IP- Inpatient   Admission Date: 12/17/2017  Length of Stay: 31 days  Attending Physician: Bryson Villavicencio MD  Primary Care Provider: Ang Hassan MD    Hospital Medicine Team: [facility] 1 Phu Regan MD    Subjective:     Principal Problem:Seizure    HPI:  Enrique Yancey is a 67 y.o. male with with a medical history significant for drug-induced dermatomyositis, essential hypertension, left ICA stenosis with subsequent MCA CVA with residual right spastic hemiparesis who presented to [facility] with new onset seizure from [facility] on 12/17/2017.  He presented to outside ED with seizure activity for approximately 20 minutes. Per EMS, he was diaphoretic on arrival and tachycardic. Per nursing home, pt is nonverbal at baseline, but can focus on the speaker. He has a trach in place. He is being admitted to [facility] for a higher level of care.     Hospital Course:  12/17: Admit to [facility], EEG pending, Keppra 1 G  at OSH and 500 Q 12 started, CTH: No acute process   12/18: Increase keppra per epilepsy, sputum culture, trend procal  12/19: Treated for ESBL UTI and HAP (respiratory cultures growing Providencia/Proteus) with Zosyn for 7 days. Patient remained clinically stable during his hospital course and discharge to [facility] deferred secondary to high settings on trach collar  12/30: Stepped-down to hospital medicine 1  01/03: Awaiting placement.   01/04: No acute issues over night. Awaiting placement.   1/05: NAEON, TFs running at 40cc/hr increase to 45cc./hr, pt remains severely debilitated still pending re-evaluation by facilities that can provide for him w/ PEG & Trach, will initiate GOC discussion w/ daughter while he is inpatient, plan for discussion Sunday (1/7) morning    01/06: No acute events over night.     01/07: No acute issue over night. Goals of cared discussion with daughter tomorrow  "01/08/18. Still awaiting placement that will allow pt with trach and PEG tubes.     01/08 No acute events. Daughter stopped by for goals of discussion. Would like to make patient Full code, at least until he sees his granddaughter yet to be born. Has rejections from a couple of nursing homes per SW. Stays afebrile, HD stable.     01/08: Urine cx on 01/03 with pseudomonas. Repeat ucx to r/o ESBL, f/u results. Deferred not to treat pseudomonas for now, will repeat urine culture and if persistently positive, will consider treating. Most likely colonization. Still awaiting placement.     1/10: Patient with new onset fever overnight up to 101.9°F with associated hypotension and tachycardia.  Good response to fluid bolus. previous urine cultures show pseudomonas sensitive to Cipro ciprofloxacin started.  There is also evidence of tube feeds surrounding trach site concerning for aspiration, chest x-ray with some opacification of right middle lobe, patient started on 7 day course of Unasyn. BCx, UCx pending    01/11: Patient HD stable. Repeat UCx, no growth. Will remove contact precaution as pt has two negative cx for ESBL. Previous UCx with pseudomonas, on Cipro. On Unasyn for possible Aspiration PNA. He is otherwise stable. Plan to wean oxygen to 3L: Nursing home will not accept if oxygen requirement is above 3L.     01/12: No acute events over night. Satting in the high 90's with out oxygen requirement. Remains stable. Awaiting placement.     01/13: Patient remains HD stable. Still not requiring oxygen and sats well above the 90's. Awaiting placement.     1/14: ERVIN pt on last day of cipro for UTI, day 5/7 of unasyn, 97% O2 on RA, pending placement     01/15: No acute events over nigh. Completed 5 day course of Cipro for UTI. On Unasyn for aspiration PNA. Remains afebrile, sating well in the 90's on RA. Per nurse pt is not on oxygen "just flow".     01/16: No acute events over nights. Still awaiting placement. Remains " HD stable.     01/17: 7/7 for Unasyn today. Remains HD stable. D/C'd Dantrolene and Zanaflex concerning for drowsiness/unecessary meds. Awaiting placement.     Interval History: NAEON.     Review of Systems   Unable to perform ROS: Acuity of condition     Objective:     Vital Signs (Most Recent):  Temp: 98.5 °F (36.9 °C) (01/17/18 1602)  Pulse: 68 (01/17/18 1602)  Resp: 18 (01/17/18 1602)  BP: (!) 156/85 (01/17/18 1602)  SpO2: 95 % (01/17/18 1602) Vital Signs (24h Range):  Temp:  [97.1 °F (36.2 °C)-99 °F (37.2 °C)] 98.5 °F (36.9 °C)  Pulse:  [68-81] 68  Resp:  [16-20] 18  SpO2:  [93 %-99 %] 95 %  BP: (124-156)/(74-85) 156/85     Weight: 54 kg (119 lb 0.8 oz)  Body mass index is 19.81 kg/m².    Intake/Output Summary (Last 24 hours) at 01/17/18 1648  Last data filed at 01/17/18 1126   Gross per 24 hour   Intake             1690 ml   Output                0 ml   Net             1690 ml      Physical Exam   Constitutional: He appears cachectic.   Less responsive/alert today   HENT:   Head: Normocephalic and atraumatic.   Eyes: EOM are normal. Pupils are equal, round, and reactive to light.   Neck: Neck supple.   Cardiovascular: Normal rate and regular rhythm.    Pulmonary/Chest: Effort normal. No respiratory distress.   Trach noted    Abdominal: Soft.   PEG placed   Musculoskeletal:   B/l UE & LE contracted   Neurological:   Somnolent but does open eye to voice and stimuli.  Speech: non-verbal.  Does not follow commands.       Skin: Skin is warm and dry.   Psychiatric: Cognition and memory are impaired.       Significant Labs:   CBC:   Recent Labs  Lab 01/17/18  0446   WBC 6.86   HGB 9.8*   HCT 30.3*        CMP:   Recent Labs  Lab 01/17/18  0446      K 3.8      CO2 20*   GLU 96   BUN 28*   CREATININE 1.1   CALCIUM 8.5*   ANIONGAP 7*   EGFRNONAA >60.0       Significant Imaging: I have reviewed all pertinent imaging results/findings within the past 24 hours.    Assessment/Plan:      * Seizure    -  Patient without seizure activity since admission.  - Likely instigated by dehydration and infections (ESBL UTI and HAP - patient has completed therapy).   -Completed 5 day course of Cipro for UTI, possible precipitant. Repeat urine cx negative   - Continue levetiracetam 1 g by PEG BID.      Pharyngoesophageal dysphagia    - s/p CVA.  - Was Tolerating TF at goal rate of 45cc/hr, however rate decreased to trickle feeds on January 10 secondary to concern for aspiration with associated fever  -Treating with Unasyn for suspected aspiration PNA given fever and HD instability on 01/10/17, has been afebrile since, HD stable since, will finish 7 day course of treatment, day 7/7 today.       Debility      Pt remains severely debilitated still pending re-evaluation by facilities that can provide for him w/ PEG & Trach, GOC discussed with daughter who stated, would like patient full code for now.      Aphasia    - s/p CVA  -Expressive aphasia, unclear if pt has receptive aphasia, doesn't follow commands, opens eyes to voice.       CKD (chronic kidney disease) stage 3, GFR 30-59 ml/min    - Avoid nephrotic agents  - Renal function at baseline  - continue to monitor       Normocytic anemia    - Hemoglobin stable at ~10, without active bleeding.  - Likely component of anemia of chronic disease.       Benign prostatic hyperplasia with lower urinary tract symptoms    - Continue finasteride 5 mg per G tuve daily and tamsulosin 0.4 mg per G tube TID.   -completed 5 day course of cipro  for pseudomonas UTI, previously treated for ESBL E.col      Hypothyroidism    - Continue levothyroxine 75 mcg per G tube daily.      Cerebrovascular accident (CVA) due to thrombosis of left carotid artery    - Prior left MCA CVA in 2016 with residual right spastic hemiparesis, purportedly s/p CEA.   - See below.       Right spastic hemiparesis    - From previous L MCA CVA.  - Continue dantrolene 25 mg per G tube TID and baclofen 5 mg per G tube TID.         VTE Risk Mitigation         Ordered     enoxaparin injection 40 mg  Every 24 hours (non-standard times)     Route:  Subcutaneous        12/17/17 1131     Medium Risk of VTE  Once      12/17/17 0449     Place sequential compression device  Until discontinued      12/17/17 0449          Phu Regan MD  Department of Hospital Medicine   [facility] 1  I have reviewed and concur with the resident's history, physical, assessment, and plan.  I have personally interviewed and examined the patient at bedside.  See below addendum for my evaluation and additional findings.     Mr. Enrique Yancey is a 67 y.o. male who is followed for Seizure.    -- The current medical regimen is effective;  continue present plan and medications.

## 2018-01-18 LAB
POCT GLUCOSE: 113 MG/DL (ref 70–110)
POCT GLUCOSE: 92 MG/DL (ref 70–110)

## 2018-01-18 PROCEDURE — 25000003 PHARM REV CODE 250: Performed by: PSYCHIATRY & NEUROLOGY

## 2018-01-18 PROCEDURE — 25000003 PHARM REV CODE 250: Performed by: HOSPITALIST

## 2018-01-18 PROCEDURE — 94761 N-INVAS EAR/PLS OXIMETRY MLT: CPT

## 2018-01-18 PROCEDURE — 99232 SBSQ HOSP IP/OBS MODERATE 35: CPT | Mod: GC,,, | Performed by: HOSPITALIST

## 2018-01-18 PROCEDURE — 11000001 HC ACUTE MED/SURG PRIVATE ROOM

## 2018-01-18 PROCEDURE — 63600175 PHARM REV CODE 636 W HCPCS: Performed by: PHYSICIAN ASSISTANT

## 2018-01-18 PROCEDURE — 25000003 PHARM REV CODE 250: Performed by: PHYSICIAN ASSISTANT

## 2018-01-18 RX ADMIN — MUPIROCIN: 20 OINTMENT TOPICAL at 10:01

## 2018-01-18 RX ADMIN — MUPIROCIN: 20 OINTMENT TOPICAL at 09:01

## 2018-01-18 RX ADMIN — BETHANECHOL CHLORIDE 10 MG: 10 TABLET ORAL at 09:01

## 2018-01-18 RX ADMIN — POLYETHYLENE GLYCOL 3350 17 G: 17 POWDER, FOR SOLUTION ORAL at 10:01

## 2018-01-18 RX ADMIN — FAMOTIDINE 20 MG: 20 TABLET, FILM COATED ORAL at 10:01

## 2018-01-18 RX ADMIN — VITAMIN C 1 TABLET: TAB at 10:01

## 2018-01-18 RX ADMIN — TAMSULOSIN HYDROCHLORIDE 0.4 MG: 0.4 CAPSULE ORAL at 10:01

## 2018-01-18 RX ADMIN — FINASTERIDE 5 MG: 5 TABLET, FILM COATED ORAL at 10:01

## 2018-01-18 RX ADMIN — BETHANECHOL CHLORIDE 10 MG: 10 TABLET ORAL at 06:01

## 2018-01-18 RX ADMIN — LEVETIRACETAM 1000 MG: 100 SOLUTION ORAL at 10:01

## 2018-01-18 RX ADMIN — LEVOTHYROXINE SODIUM 75 MCG: 75 TABLET ORAL at 10:01

## 2018-01-18 RX ADMIN — ENOXAPARIN SODIUM 40 MG: 100 INJECTION SUBCUTANEOUS at 04:01

## 2018-01-18 RX ADMIN — LEVETIRACETAM 1000 MG: 100 SOLUTION ORAL at 09:01

## 2018-01-18 RX ADMIN — DONEPEZIL HYDROCHLORIDE 5 MG: 5 TABLET, FILM COATED ORAL at 10:01

## 2018-01-18 RX ADMIN — STANDARDIZED SENNA CONCENTRATE AND DOCUSATE SODIUM 1 TABLET: 8.6; 5 TABLET, FILM COATED ORAL at 10:01

## 2018-01-18 RX ADMIN — ASPIRIN 81 MG CHEWABLE TABLET 81 MG: 81 TABLET CHEWABLE at 10:01

## 2018-01-18 RX ADMIN — ATORVASTATIN CALCIUM 40 MG: 20 TABLET, FILM COATED ORAL at 10:01

## 2018-01-18 RX ADMIN — BETHANECHOL CHLORIDE 10 MG: 10 TABLET ORAL at 01:01

## 2018-01-18 RX ADMIN — TAMSULOSIN HYDROCHLORIDE 0.4 MG: 0.4 CAPSULE ORAL at 09:01

## 2018-01-18 RX ADMIN — MINERAL OIL AND WHITE PETROLATUM: 150; 830 OINTMENT OPHTHALMIC at 09:01

## 2018-01-18 NOTE — PLAN OF CARE
Problem: Patient Care Overview  Goal: Plan of Care Review  Outcome: Ongoing (interventions implemented as appropriate)  Pt alert and responded by touch and voice, however, non-verbal, unable to make his needs known. Continuous feed tubing via PEG-tube in progress with Isosource 1.5 @ 45cc/ hr with tolerated. Tube placement and residual check done. Flushed and hydrated per ordered. O2 via trach continues. Trach care done by Rt. Suctioned as needed with tolerated. Incontinent care rendered by PCT. Turned and repositioned q2hr. Kept dry and comfortable. No apparent distress noted. All needs anticipated. Aspiration and seizure precaution maintained.

## 2018-01-18 NOTE — SUBJECTIVE & OBJECTIVE
Interval History: NAEON.     Review of Systems   Unable to perform ROS: Acuity of condition     Objective:     Vital Signs (Most Recent):  Temp: 99.2 °F (37.3 °C) (01/18/18 1124)  Pulse: 72 (01/18/18 1124)  Resp: 18 (01/18/18 1124)  BP: 138/79 (01/18/18 1124)  SpO2: 97 % (01/18/18 1124) Vital Signs (24h Range):  Temp:  [97.2 °F (36.2 °C)-99.2 °F (37.3 °C)] 99.2 °F (37.3 °C)  Pulse:  [63-81] 72  Resp:  [16-18] 18  SpO2:  [95 %-97 %] 97 %  BP: (125-166)/(73-85) 138/79     Weight: 54 kg (119 lb 0.8 oz)  Body mass index is 19.81 kg/m².    Intake/Output Summary (Last 24 hours) at 01/18/18 1456  Last data filed at 01/18/18 0900   Gross per 24 hour   Intake             1785 ml   Output                0 ml   Net             1785 ml      Physical Exam   Constitutional: He appears cachectic.   More alert today   HENT:   Head: Normocephalic and atraumatic.   Eyes: EOM are normal. Pupils are equal, round, and reactive to light.   Neck: Neck supple.   Cardiovascular: Normal rate and regular rhythm.    Pulmonary/Chest: Effort normal. No respiratory distress.   Trach noted    Abdominal: Soft.   PEG placed   Musculoskeletal:   B/l UE & LE contracted   Neurological:   Somnolent but does open eye to voice and stimuli.  Speech: non-verbal.  Does not follow commands.       Skin: Skin is warm and dry.   Psychiatric: Cognition and memory are impaired.       Significant Labs:   CBC:   Recent Labs  Lab 01/17/18  0446   WBC 6.86   HGB 9.8*   HCT 30.3*        CMP:   Recent Labs  Lab 01/17/18  0446      K 3.8      CO2 20*   GLU 96   BUN 28*   CREATININE 1.1   CALCIUM 8.5*   ANIONGAP 7*   EGFRNONAA >60.0       Significant Imaging: I have reviewed all pertinent imaging results/findings within the past 24 hours.

## 2018-01-18 NOTE — PROGRESS NOTES
[facility] Hospital Medicine  Progress Note    Patient Name: Enrique Yancey  MRN: [id number]  Patient Class: IP- Inpatient   Admission Date: 12/17/2017  Length of Stay: 32 days  Attending Physician: Bryson Villavicencio MD  Primary Care Provider: Agn Hassan MD    Hospital Medicine Team: [facility] Phu Regan MD    Subjective:     Principal Problem:Seizure    HPI:  Enrique Yancey is a 67 y.o. male with with a medical history significant for drug-induced dermatomyositis, essential hypertension, left ICA stenosis with subsequent MCA CVA with residual right spastic hemiparesis who [facility] [place] [facility] [place] [facility] onset seizure from [facility] on 12/17/2017.  He presented to outside ED with seizure activity for approximately 20 minutes. Per EMS, he was diaphoretic on arrival and tachycardic. Per nursing home, pt is nonverbal at baseline, but can focus on the speaker. He has a trach in place. He is being admitted to [facility] for a higher level of care.     Hospital Course:  12/17: Admit to [facility], EEG pending, Keppra 1 G  at OSH and 500 Q 12 started, CTH: No acute process   12/18: Increase keppra per epilepsy, sputum culture, trend procal  12/19: Treated for ESBL UTI and HAP (respiratory cultures growing Providencia/Proteus) with Zosyn for 7 days. Patient remained clinically stable during his hospital course and discharge to [facility] deferred secondary to high settings on trach collar  12/30: Stepped-down to hospital medicine 1  01/03: Awaiting placement.   01/04: No acute issues over night. Awaiting placement.   1/05: NAEON, TFs running at 40cc/hr increase to 45cc./hr, pt remains severely debilitated still pending re-evaluation by facilities that can provide for him w/ PEG & Trach, will initiate GOC discussion w/ daughter while he is inpatient, plan for discussion Sunday (1/7) morning    01/06: No acute events over night.     01/07: No acute issue over night. Goals of cared discussion with daughter tomorrow  "01/08/18. Still awaiting placement that will allow pt with trach and PEG tubes.     01/08 No acute events. Daughter stopped by for goals of discussion. Would like to make patient Full code, at least until he sees his granddaughter yet to be born. Has rejections from a couple of nursing homes per SW. Stays afebrile, HD stable.     01/08: Urine cx on 01/03 with pseudomonas. Repeat ucx to r/o ESBL, f/u results. Deferred not to treat pseudomonas for now, will repeat urine culture and if persistently positive, will consider treating. Most likely colonization. Still awaiting placement.     1/10: Patient with new onset fever overnight up to 101.9°F with associated hypotension and tachycardia.  Good response to fluid bolus. previous urine cultures show pseudomonas sensitive to Cipro ciprofloxacin started.  There is also evidence of tube feeds surrounding trach site concerning for aspiration, chest x-ray with some opacification of right middle lobe, patient started on 7 day course of Unasyn. BCx, UCx pending    01/11: Patient HD stable. Repeat UCx, no growth. Will remove contact precaution as pt has two negative cx for ESBL. Previous UCx with pseudomonas, on Cipro. On Unasyn for possible Aspiration PNA. He is otherwise stable. Plan to wean oxygen to 3L: Nursing home will not accept if oxygen requirement is above 3L.     01/12: No acute events over night. Satting in the high 90's with out oxygen requirement. Remains stable. Awaiting placement.     01/13: Patient remains HD stable. Still not requiring oxygen and sats well above the 90's. Awaiting placement.     1/14: ERVIN pt on last day of cipro for UTI, day 5/7 of unasyn, 97% O2 on RA, pending placement     01/15: No acute events over nigh. Completed 5 day course of Cipro for UTI. On Unasyn for aspiration PNA. Remains afebrile, sating well in the 90's on RA. Per nurse pt is not on oxygen "just flow".     01/16: No acute events over nights. Still awaiting placement. Remains " HD stable.     01/17: 7/7 for Unasyn today. Remains HD stable. D/C'd Dantrolene and Zanaflex concerning for drowsiness/unecessary meds. Awaiting placement.     01/18: HD stable. More alert today. Awaiting placement.     Interval History: NAEON.     Review of Systems   Unable to perform ROS: Acuity of condition     Objective:     Vital Signs (Most Recent):  Temp: 99.2 °F (37.3 °C) (01/18/18 1124)  Pulse: 72 (01/18/18 1124)  Resp: 18 (01/18/18 1124)  BP: 138/79 (01/18/18 1124)  SpO2: 97 % (01/18/18 1124) Vital Signs (24h Range):  Temp:  [97.2 °F (36.2 °C)-99.2 °F (37.3 °C)] 99.2 °F (37.3 °C)  Pulse:  [63-81] 72  Resp:  [16-18] 18  SpO2:  [95 %-97 %] 97 %  BP: (125-166)/(73-85) 138/79     Weight: 54 kg (119 lb 0.8 oz)  Body mass index is 19.81 kg/m².    Intake/Output Summary (Last 24 hours) at 01/18/18 1456  Last data filed at 01/18/18 0900   Gross per 24 hour   Intake             1785 ml   Output                0 ml   Net             1785 ml      Physical Exam   Constitutional: He appears cachectic.   More alert today   HENT:   Head: Normocephalic and atraumatic.   Eyes: EOM are normal. Pupils are equal, round, and reactive to light.   Neck: Neck supple.   Cardiovascular: Normal rate and regular rhythm.    Pulmonary/Chest: Effort normal. No respiratory distress.   Trach noted    Abdominal: Soft.   PEG placed   Musculoskeletal:   B/l UE & LE contracted   Neurological:   Somnolent but does open eye to voice and stimuli.  Speech: non-verbal.  Does not follow commands.       Skin: Skin is warm and dry.   Psychiatric: Cognition and memory are impaired.       Significant Labs:   CBC:   Recent Labs  Lab 01/17/18  0446   WBC 6.86   HGB 9.8*   HCT 30.3*        CMP:   Recent Labs  Lab 01/17/18 0446      K 3.8      CO2 20*   GLU 96   BUN 28*   CREATININE 1.1   CALCIUM 8.5*   ANIONGAP 7*   EGFRNONAA >60.0       Significant Imaging: I have reviewed all pertinent imaging results/findings within the past 24  hours.    Assessment/Plan:      * Seizure    - Patient without seizure activity since admission.  - Likely instigated by dehydration and infections (ESBL UTI and HAP - patient has completed therapy).   -Completed 5 day course of Cipro for UTI, possible precipitant. Repeat urine cx negative   - Continue levetiracetam 1 g by PEG BID.      Pharyngoesophageal dysphagia    - s/p CVA.  - Was Tolerating TF at goal rate of 45cc/hr, however rate decreased to trickle feeds on January 10 secondary to concern for aspiration with associated fever  -Treating with Unasyn for suspected aspiration PNA given fever and HD instability on 01/10/17, has been afebrile since, HD stable since, will finish 7 day course of treatment, day 7/7 today.       Debility      Pt remains severely debilitated still pending re-evaluation by facilities that can provide for him w/ PEG & Trach, GOC discussed with daughter who stated, would like patient full code for now.      Aphasia    - s/p CVA  -Expressive aphasia, unclear if pt has receptive aphasia, doesn't follow commands, opens eyes to voice.       CKD (chronic kidney disease) stage 3, GFR 30-59 ml/min    - Avoid nephrotic agents  - Renal function at baseline  - continue to monitor       Normocytic anemia    - Hemoglobin stable at ~10, without active bleeding.  - Likely component of anemia of chronic disease.       Benign prostatic hyperplasia with lower urinary tract symptoms    - Continue finasteride 5 mg per G tuve daily and tamsulosin 0.4 mg per G tube TID.   -completed 5 day course of cipro  for pseudomonas UTI, previously treated for ESBL E.col      Hypothyroidism    - Continue levothyroxine 75 mcg per G tube daily.      Cerebrovascular accident (CVA) due to thrombosis of left carotid artery    - Prior left MCA CVA in 2016 with residual right spastic hemiparesis, purportedly s/p CEA.   - See below.       Right spastic hemiparesis    - From previous L MCA CVA.  - Continue dantrolene 25 mg per G  tube TID and baclofen 5 mg per G tube TID.        VTE Risk Mitigation         Ordered     enoxaparin injection 40 mg  Every 24 hours (non-standard times)     Route:  Subcutaneous        12/17/17 1131     Medium Risk of VTE  Once      12/17/17 0449     Place sequential compression device  Until discontinued      12/17/17 0449        Phu Regan MD  Department of Hospital Medicine   [facility]

## 2018-01-18 NOTE — PLAN OF CARE
Problem: Patient Care Overview  Goal: Plan of Care Review  Outcome: Ongoing (interventions implemented as appropriate)  VSS. Trach clean, dry, intact. Non-verbal signs of pain absent. TF and water boluses given per orders, patient tolerating well. Patient repositioned with wedge throughout shift and changed PRN. Patient remained free from falls and trauma. Frequent rounding. WCTM.

## 2018-01-18 NOTE — PROGRESS NOTES
Patient's daughter, Chioma Rivera, left a message at the  stating that she would like Dr. Villavicencio to call her back ASAP.  Chioma's name and number are in a sticky note on patient's chart. Notified Dr. Regan about this. [facility].

## 2018-01-18 NOTE — PT/OT/SLP PROGRESS
Physical Therapy      Patient Name:  Enrique Yancey   MRN:  [id number]    PT acknowledged and discontinued recent PT eval and tx orders. Pt is familiar to PT, severely contracted, and at new baseline with mobility. Not appropriate for skilled PT services. Please perform PROM daily to assist c/ further contracture formation.    Marianna Castro, PT, DPT  1/18/2018

## 2018-01-18 NOTE — PLAN OF CARE
JOSE LUIS left message for admissions at [facility] ([phone]) and admissions at [facility] ([phone]).    12:50pm- Nancy- admissions is contacting pts family, DON is reviewing medical records.     2pm-JOSE LUIS spoke with admissions staff, asking questions about clear urine cultures and reoccurring fevers. JOSE LUIS sent via [facility] lab results for urine cultures on 1/9. JOSE LUIS spoke with CM regarding this info and will call admissions to follow up.    Alexandra Banegas, JEOVANY [id number]

## 2018-01-18 NOTE — PLAN OF CARE
SW checked in with pts dtg, she has spoken with clare and would like SW to continue trying for placement at [place]. SW reviewed list of NH that take trachs with dtg, and pt has been declined by all except for [facility]. JOSE LUIS will continue to follow.    Alexandra Banegas, LCSW [phone]

## 2018-01-18 NOTE — PLAN OF CARE
Problem: Patient Care Overview  Goal: Plan of Care Review  Outcome: Ongoing (interventions implemented as appropriate)  Patient is nonverbal but opens eyes to voice and touch. Vital signs stable. Patient on medical air to trach with good saturations. Blood glucose monitored. Isosource feedings at 45ml/hr to PEG tube well tolerated. 200ml water boluses to PEG tube QID well tolerated. No falls throughout shift. No signs of pain. Patient waiting for placement.

## 2018-01-18 NOTE — PLAN OF CARE
"Updated info sent to [facility] per admissions request.    SW spoke with pts daughter-she is currently frustrated with being unable to speak with drs-she is requesting that drs try a "CAP" test. SW agreed to speak with [facility] team and ask them to call her. She also explained that [facility] is her first choice and questioned why they do not have updated info on pt. SW explained referral process and the fact that [place] [facility] admissions has not returned phone calls in several days. JOSE LUIS reviewed list of facilities that declined pt and told dtg that [facility] and [facility] are the only facilities currently reviewing pt for admission. JOSE LUIS agreed to reach out to [facility] for the second time today and to request a call back from the drs. SW will f/u with dtg before the end of the day.    Alexandra Banegas, JEOVANY [phone]  "

## 2018-01-19 LAB
ANION GAP SERPL CALC-SCNC: 6 MMOL/L
BASOPHILS # BLD AUTO: 0.03 K/UL
BASOPHILS NFR BLD: 0.4 %
BUN SERPL-MCNC: 29 MG/DL
CALCIUM SERPL-MCNC: 8.7 MG/DL
CHLORIDE SERPL-SCNC: 101 MMOL/L
CO2 SERPL-SCNC: 21 MMOL/L
CREAT SERPL-MCNC: 1.1 MG/DL
DIFFERENTIAL METHOD: ABNORMAL
EOSINOPHIL # BLD AUTO: 0.3 K/UL
EOSINOPHIL NFR BLD: 4.1 %
ERYTHROCYTE [DISTWIDTH] IN BLOOD BY AUTOMATED COUNT: 15.5 %
EST. GFR  (AFRICAN AMERICAN): >60 ML/MIN/1.73 M^2
EST. GFR  (NON AFRICAN AMERICAN): >60 ML/MIN/1.73 M^2
GLUCOSE SERPL-MCNC: 88 MG/DL
HCT VFR BLD AUTO: 28.9 %
HGB BLD-MCNC: 9.7 G/DL
IMM GRANULOCYTES # BLD AUTO: 0.04 K/UL
IMM GRANULOCYTES NFR BLD AUTO: 0.5 %
LYMPHOCYTES # BLD AUTO: 0.9 K/UL
LYMPHOCYTES NFR BLD: 12.4 %
MAGNESIUM SERPL-MCNC: 2 MG/DL
MCH RBC QN AUTO: 31 PG
MCHC RBC AUTO-ENTMCNC: 33.6 G/DL
MCV RBC AUTO: 92 FL
MONOCYTES # BLD AUTO: 0.9 K/UL
MONOCYTES NFR BLD: 11.6 %
NEUTROPHILS # BLD AUTO: 5.4 K/UL
NEUTROPHILS NFR BLD: 71 %
NRBC BLD-RTO: 0 /100 WBC
PHOSPHATE SERPL-MCNC: 3.5 MG/DL
PLATELET # BLD AUTO: 214 K/UL
PMV BLD AUTO: 11.6 FL
POCT GLUCOSE: 107 MG/DL (ref 70–110)
POCT GLUCOSE: 87 MG/DL (ref 70–110)
POTASSIUM SERPL-SCNC: 3.9 MMOL/L
RBC # BLD AUTO: 3.13 M/UL
SODIUM SERPL-SCNC: 128 MMOL/L
WBC # BLD AUTO: 7.57 K/UL

## 2018-01-19 PROCEDURE — 36415 COLL VENOUS BLD VENIPUNCTURE: CPT

## 2018-01-19 PROCEDURE — 25000003 PHARM REV CODE 250: Performed by: HOSPITALIST

## 2018-01-19 PROCEDURE — 63600175 PHARM REV CODE 636 W HCPCS: Performed by: PHYSICIAN ASSISTANT

## 2018-01-19 PROCEDURE — 84100 ASSAY OF PHOSPHORUS: CPT

## 2018-01-19 PROCEDURE — 85025 COMPLETE CBC W/AUTO DIFF WBC: CPT

## 2018-01-19 PROCEDURE — 25000003 PHARM REV CODE 250: Performed by: PHYSICIAN ASSISTANT

## 2018-01-19 PROCEDURE — 83735 ASSAY OF MAGNESIUM: CPT

## 2018-01-19 PROCEDURE — 99900026 HC AIRWAY MAINTENANCE (STAT)

## 2018-01-19 PROCEDURE — 11000001 HC ACUTE MED/SURG PRIVATE ROOM

## 2018-01-19 PROCEDURE — 99232 SBSQ HOSP IP/OBS MODERATE 35: CPT | Mod: GC,,, | Performed by: HOSPITALIST

## 2018-01-19 PROCEDURE — 25000003 PHARM REV CODE 250: Performed by: PSYCHIATRY & NEUROLOGY

## 2018-01-19 PROCEDURE — 80048 BASIC METABOLIC PNL TOTAL CA: CPT

## 2018-01-19 RX ORDER — ACETAMINOPHEN 650 MG/20.3ML
650 LIQUID ORAL EVERY 4 HOURS PRN
Status: DISCONTINUED | OUTPATIENT
Start: 2018-01-19 | End: 2018-01-24 | Stop reason: HOSPADM

## 2018-01-19 RX ADMIN — MUPIROCIN: 20 OINTMENT TOPICAL at 10:01

## 2018-01-19 RX ADMIN — TAMSULOSIN HYDROCHLORIDE 0.4 MG: 0.4 CAPSULE ORAL at 08:01

## 2018-01-19 RX ADMIN — BETHANECHOL CHLORIDE 10 MG: 10 TABLET ORAL at 02:01

## 2018-01-19 RX ADMIN — ATORVASTATIN CALCIUM 40 MG: 20 TABLET, FILM COATED ORAL at 08:01

## 2018-01-19 RX ADMIN — LEVETIRACETAM 1000 MG: 100 SOLUTION ORAL at 08:01

## 2018-01-19 RX ADMIN — VITAMIN C 1 TABLET: TAB at 08:01

## 2018-01-19 RX ADMIN — FINASTERIDE 5 MG: 5 TABLET, FILM COATED ORAL at 08:01

## 2018-01-19 RX ADMIN — BETHANECHOL CHLORIDE 10 MG: 10 TABLET ORAL at 05:01

## 2018-01-19 RX ADMIN — MUPIROCIN: 20 OINTMENT TOPICAL at 08:01

## 2018-01-19 RX ADMIN — TAMSULOSIN HYDROCHLORIDE 0.4 MG: 0.4 CAPSULE ORAL at 10:01

## 2018-01-19 RX ADMIN — FAMOTIDINE 20 MG: 20 TABLET, FILM COATED ORAL at 08:01

## 2018-01-19 RX ADMIN — ENOXAPARIN SODIUM 40 MG: 100 INJECTION SUBCUTANEOUS at 05:01

## 2018-01-19 RX ADMIN — DONEPEZIL HYDROCHLORIDE 5 MG: 5 TABLET, FILM COATED ORAL at 08:01

## 2018-01-19 RX ADMIN — MINERAL OIL AND WHITE PETROLATUM: 150; 830 OINTMENT OPHTHALMIC at 10:01

## 2018-01-19 RX ADMIN — POLYETHYLENE GLYCOL 3350 17 G: 17 POWDER, FOR SOLUTION ORAL at 08:01

## 2018-01-19 RX ADMIN — ASPIRIN 81 MG CHEWABLE TABLET 81 MG: 81 TABLET CHEWABLE at 08:01

## 2018-01-19 RX ADMIN — BETHANECHOL CHLORIDE 10 MG: 10 TABLET ORAL at 10:01

## 2018-01-19 RX ADMIN — STANDARDIZED SENNA CONCENTRATE AND DOCUSATE SODIUM 1 TABLET: 8.6; 5 TABLET, FILM COATED ORAL at 08:01

## 2018-01-19 RX ADMIN — LEVETIRACETAM 1000 MG: 100 SOLUTION ORAL at 10:01

## 2018-01-19 RX ADMIN — LEVOTHYROXINE SODIUM 75 MCG: 75 TABLET ORAL at 08:01

## 2018-01-19 NOTE — PLAN OF CARE
Pt discussed in IDTR, pt with trach on humidified air, PEG with TF's, awaiting SNF placement. Pt has no documented participation with PT/OT since 12/29/17, needs NH placement. Referrals out to [facility], awaiting replies, SW following.      01/19/18 1348   Discharge Reassessment   Assessment Type Discharge Planning Reassessment

## 2018-01-19 NOTE — PROGRESS NOTES
[facility] Hospital Medicine  Progress Note    Patient Name: Enrique Yancey  MRN: [id number]  Patient Class: IP- Inpatient   Admission Date: 12/17/2017  Length of Stay: 33 days  Attending Physician: Bryson Villavicencio MD  Primary Care Provider: Ang Hassan MD    Hospital Medicine Team: [facility] 1 Ivonne Hess MD    Subjective:     Principal Problem:Seizure    HPI:  Enrique Yancey is a 67 y.o. male with with a medical history significant for drug-induced dermatomyositis, essential hypertension, left ICA stenosis with subsequent MCA CVA with residual right spastic hemiparesis who presented to [facility] with new onset seizure from [facility] on 12/17/2017.  He presented to outside ED with seizure activity for approximately 20 minutes. Per EMS, he was diaphoretic on arrival and tachycardic. Per nursing home, pt is nonverbal at baseline, but can focus on the speaker. He has a trach in place. He is being admitted to [facility] for a higher level of care.     Hospital Course:  12/17: Admit to [facility], EEG pending, Keppra 1 G  at OSH and 500 Q 12 started, CTH: No acute process   12/18: Increase keppra per epilepsy, sputum culture, trend procal  12/19: Treated for ESBL UTI and HAP (respiratory cultures growing Providencia/Proteus) with Zosyn for 7 days. Patient remained clinically stable during his hospital course and discharge to [facility] deferred secondary to high settings on trach collar  12/30: Stepped-down to hospital medicine 1  01/03: Awaiting placement.   01/04: No acute issues over night. Awaiting placement.   1/05: NAEON, TFs running at 40cc/hr increase to 45cc./hr, pt remains severely debilitated still pending re-evaluation by facilities that can provide for him w/ PEG & Trach, will initiate GOC discussion w/ daughter while he is inpatient, plan for discussion Sunday (1/7) morning    01/06: No acute events over night.     01/07: No acute issue over night. Goals of cared discussion with daughter tomorrow  "01/08/18. Still awaiting placement that will allow pt with trach and PEG tubes.     01/08 No acute events. Daughter stopped by for goals of discussion. Would like to make patient Full code, at least until he sees his granddaughter yet to be born. Has rejections from a couple of nursing homes per SW. Stays afebrile, HD stable.     01/08: Urine cx on 01/03 with pseudomonas. Repeat ucx to r/o ESBL, f/u results. Deferred not to treat pseudomonas for now, will repeat urine culture and if persistently positive, will consider treating. Most likely colonization. Still awaiting placement.     1/10: Patient with new onset fever overnight up to 101.9°F with associated hypotension and tachycardia.  Good response to fluid bolus. previous urine cultures show pseudomonas sensitive to Cipro ciprofloxacin started.  There is also evidence of tube feeds surrounding trach site concerning for aspiration, chest x-ray with some opacification of right middle lobe, patient started on 7 day course of Unasyn. BCx, UCx pending    01/11: Patient HD stable. Repeat UCx, no growth. Will remove contact precaution as pt has two negative cx for ESBL. Previous UCx with pseudomonas, on Cipro. On Unasyn for possible Aspiration PNA. He is otherwise stable. Plan to wean oxygen to 3L: Nursing home will not accept if oxygen requirement is above 3L.     01/12: No acute events over night. Satting in the high 90's with out oxygen requirement. Remains stable. Awaiting placement.     01/13: Patient remains HD stable. Still not requiring oxygen and sats well above the 90's. Awaiting placement.     1/14: ERVIN pt on last day of cipro for UTI, day 5/7 of unasyn, 97% O2 on RA, pending placement     01/15: No acute events over nigh. Completed 5 day course of Cipro for UTI. On Unasyn for aspiration PNA. Remains afebrile, sating well in the 90's on RA. Per nurse pt is not on oxygen "just flow".     01/16: No acute events over nights. Still awaiting placement. Remains " HD stable.     01/17: 7/7 for Unasyn today. Remains HD stable. D/C'd Dantrolene and Zanaflex concerning for drowsiness/unecessary meds. Awaiting placement.     01/18: HD stable. More alert today. Awaiting placement.     1/19: ERVIN, daughter would like to attempt Cap trial of trach to see if can be weaned off. Pt is not on any oxygen but rather on room air flow currently. Discussed with resp therapy. Will attempt Cap trial.     Interval History: NAEON.     Review of Systems   Unable to perform ROS: Acuity of condition     Objective:     Vital Signs (Most Recent):  Temp: 99 °F (37.2 °C) (01/19/18 1137)  Pulse: 83 (01/19/18 1142)  Resp: 18 (01/19/18 1137)  BP: 111/62 (01/19/18 1142)  SpO2: (!) 92 % (01/19/18 1137) Vital Signs (24h Range):  Temp:  [99 °F (37.2 °C)-100.8 °F (38.2 °C)] 99 °F (37.2 °C)  Pulse:  [67-99] 83  Resp:  [18-19] 18  SpO2:  [92 %-98 %] 92 %  BP: (111-135)/(60-73) 111/62     Weight: 54.2 kg (119 lb 7.8 oz)  Body mass index is 19.88 kg/m².    Intake/Output Summary (Last 24 hours) at 01/19/18 1518  Last data filed at 01/19/18 1153   Gross per 24 hour   Intake              520 ml   Output                0 ml   Net              520 ml      Physical Exam   Constitutional: He appears cachectic.   More alert today   HENT:   Head: Normocephalic and atraumatic.   Eyes: EOM are normal. Pupils are equal, round, and reactive to light.   Neck: Neck supple.   Cardiovascular: Normal rate and regular rhythm.    Pulmonary/Chest: Effort normal. No respiratory distress.   Trach noted    Abdominal: Soft.   PEG placed   Musculoskeletal:   B/l UE & LE contracted   Neurological:   Somnolent but does open eye to voice and stimuli.  Speech: non-verbal.  Does not follow commands.       Skin: Skin is warm and dry.   Psychiatric: Cognition and memory are impaired.       Significant Labs:   CBC:     Recent Labs  Lab 01/19/18  0704   WBC 7.57   HGB 9.7*   HCT 28.9*        CMP:     Recent Labs  Lab 01/19/18  0704   NA  128*   K 3.9      CO2 21*   GLU 88   BUN 29*   CREATININE 1.1   CALCIUM 8.7   ANIONGAP 6*   EGFRNONAA >60.0       Significant Imaging: I have reviewed all pertinent imaging results/findings within the past 24 hours.    Assessment/Plan:      * Seizure    - Patient without seizure activity since admission.  - Likely instigated by dehydration and infections (ESBL UTI and HAP - patient has completed therapy).   -Completed 5 day course of Cipro for UTI, possible precipitant. Repeat urine cx negative   - Continue levetiracetam 1 g by PEG BID.          Debility      Pt remains severely debilitated still pending re-evaluation by facilities that can provide for him w/ PEG & Trach, GOC discussed with daughter who stated, would like patient full code for now.    - daughter would like Cap trial attempted  - resp communication ordered        Normocytic anemia    - Hemoglobin stable at ~10, without active bleeding.  - Likely component of anemia of chronic disease.           Benign prostatic hyperplasia with lower urinary tract symptoms    - Continue finasteride 5 mg per G tuve daily and tamsulosin 0.4 mg per G tube TID.   -completed 5 day course of cipro  for pseudomonas UTI, previously treated for ESBL E.col        Hypothyroidism    - Continue levothyroxine 75 mcg per G tube daily.            Cerebrovascular accident (CVA) due to thrombosis of left carotid artery    - Prior left MCA CVA in 2016 with residual right spastic hemiparesis, purportedly s/p CEA.   - See below.         Pharyngoesophageal dysphagia    - s/p CVA.  - Was Tolerating TF at goal rate of 45cc/hr, however rate decreased to trickle feeds on January 10 secondary to concern for aspiration with associated fever  -Treating with Unasyn for suspected aspiration PNA given fever and HD instability on 01/10/17, has been afebrile since, HD stable since, has completed 7 day course of treatment          Right spastic hemiparesis    - From previous L MCA CVA.             Aphasia    - s/p CVA  -Expressive aphasia, unclear if pt has receptive aphasia, doesn't follow commands, opens eyes to voice.           CKD (chronic kidney disease) stage 3, GFR 30-59 ml/min    - Avoid nephrotic agents  - Renal function at baseline  - continue to monitor         HTN (hypertension)                VTE Risk Mitigation         Ordered     enoxaparin injection 40 mg  Every 24 hours (non-standard times)     Route:  Subcutaneous        12/17/17 1131     Medium Risk of VTE  Once      12/17/17 0449     Place sequential compression device  Until discontinued      12/17/17 0449              Ivonne Hess MD  Department of Hospital Medicine   [facility]

## 2018-01-19 NOTE — PLAN OF CARE
SW left message for Nonia at [facility] to f/u on referral.    Alexandra Banegas, [facility] [phone]

## 2018-01-19 NOTE — ASSESSMENT & PLAN NOTE
- s/p CVA.  - Was Tolerating TF at goal rate of 45cc/hr, however rate decreased to trickle feeds on January 10 secondary to concern for aspiration with associated fever  -Treating with Unasyn for suspected aspiration PNA given fever and HD instability on 01/10/17, has been afebrile since, HD stable since, has completed 7 day course of treatment

## 2018-01-19 NOTE — PLAN OF CARE
Problem: Patient Care Overview  Goal: Plan of Care Review  Outcome: Ongoing (interventions implemented as appropriate)  Pt free from falls. Pt bed low and locked position. Pt Max temp99.7  Pt IV site without redness or edema. Pt nonverbal. KEENA mattress in place. Tube feedings and water bolus given pt is NPO. Trach collar 5L 28%

## 2018-01-19 NOTE — ASSESSMENT & PLAN NOTE
Pt remains severely debilitated still pending re-evaluation by facilities that can provide for him w/ PEG & Trach, GOC discussed with daughter who stated, would like patient full code for now.    - daughter would like Cap trial attempted  - resp communication ordered

## 2018-01-19 NOTE — CARE UPDATE
Cap placed on patient's trach.   Based on previous flow sheet trach was capped yesterday at 1532 and was trailed on and off through out the night with good results.

## 2018-01-19 NOTE — SUBJECTIVE & OBJECTIVE
Interval History: NAEON.     Review of Systems   Unable to perform ROS: Acuity of condition     Objective:     Vital Signs (Most Recent):  Temp: 99 °F (37.2 °C) (01/19/18 1137)  Pulse: 83 (01/19/18 1142)  Resp: 18 (01/19/18 1137)  BP: 111/62 (01/19/18 1142)  SpO2: (!) 92 % (01/19/18 1137) Vital Signs (24h Range):  Temp:  [99 °F (37.2 °C)-100.8 °F (38.2 °C)] 99 °F (37.2 °C)  Pulse:  [67-99] 83  Resp:  [18-19] 18  SpO2:  [92 %-98 %] 92 %  BP: (111-135)/(60-73) 111/62     Weight: 54.2 kg (119 lb 7.8 oz)  Body mass index is 19.88 kg/m².    Intake/Output Summary (Last 24 hours) at 01/19/18 1518  Last data filed at 01/19/18 1153   Gross per 24 hour   Intake              520 ml   Output                0 ml   Net              520 ml      Physical Exam   Constitutional: He appears cachectic.   More alert today   HENT:   Head: Normocephalic and atraumatic.   Eyes: EOM are normal. Pupils are equal, round, and reactive to light.   Neck: Neck supple.   Cardiovascular: Normal rate and regular rhythm.    Pulmonary/Chest: Effort normal. No respiratory distress.   Trach noted    Abdominal: Soft.   PEG placed   Musculoskeletal:   B/l UE & LE contracted   Neurological:   Somnolent but does open eye to voice and stimuli.  Speech: non-verbal.  Does not follow commands.       Skin: Skin is warm and dry.   Psychiatric: Cognition and memory are impaired.       Significant Labs:   CBC:     Recent Labs  Lab 01/19/18  0704   WBC 7.57   HGB 9.7*   HCT 28.9*        CMP:     Recent Labs  Lab 01/19/18  0704   *   K 3.9      CO2 21*   GLU 88   BUN 29*   CREATININE 1.1   CALCIUM 8.7   ANIONGAP 6*   EGFRNONAA >60.0       Significant Imaging: I have reviewed all pertinent imaging results/findings within the past 24 hours.

## 2018-01-19 NOTE — PLAN OF CARE
CHANDLER spoke with Ladarius at [facility], she requested some additional info which chandler will send through "Hey, Neighbor!". CHANDLER informed Ladarius that team will be doing a CAP trial today and pt is only on humidified room air. CHANDLER will continue to follow.    Alexandra Banegas, JUANITAW [phone]

## 2018-01-20 LAB
ANION GAP SERPL CALC-SCNC: 8 MMOL/L
BUN SERPL-MCNC: 28 MG/DL
CALCIUM SERPL-MCNC: 8.7 MG/DL
CHLORIDE SERPL-SCNC: 107 MMOL/L
CO2 SERPL-SCNC: 18 MMOL/L
CREAT SERPL-MCNC: 1.1 MG/DL
EST. GFR  (AFRICAN AMERICAN): >60 ML/MIN/1.73 M^2
EST. GFR  (NON AFRICAN AMERICAN): >60 ML/MIN/1.73 M^2
GLUCOSE SERPL-MCNC: 115 MG/DL
OSMOLALITY SERPL: 294 MOSM/KG
POCT GLUCOSE: 110 MG/DL (ref 70–110)
POCT GLUCOSE: 132 MG/DL (ref 70–110)
POTASSIUM SERPL-SCNC: 4 MMOL/L
SODIUM SERPL-SCNC: 133 MMOL/L

## 2018-01-20 PROCEDURE — 11000001 HC ACUTE MED/SURG PRIVATE ROOM

## 2018-01-20 PROCEDURE — 25000003 PHARM REV CODE 250: Performed by: HOSPITALIST

## 2018-01-20 PROCEDURE — 25000003 PHARM REV CODE 250: Performed by: PHYSICIAN ASSISTANT

## 2018-01-20 PROCEDURE — 25000003 PHARM REV CODE 250: Performed by: PSYCHIATRY & NEUROLOGY

## 2018-01-20 PROCEDURE — 94761 N-INVAS EAR/PLS OXIMETRY MLT: CPT

## 2018-01-20 PROCEDURE — 63600175 PHARM REV CODE 636 W HCPCS: Performed by: PHYSICIAN ASSISTANT

## 2018-01-20 PROCEDURE — 80048 BASIC METABOLIC PNL TOTAL CA: CPT

## 2018-01-20 PROCEDURE — 99232 SBSQ HOSP IP/OBS MODERATE 35: CPT | Mod: GC,,, | Performed by: HOSPITALIST

## 2018-01-20 PROCEDURE — 83930 ASSAY OF BLOOD OSMOLALITY: CPT

## 2018-01-20 PROCEDURE — 36415 COLL VENOUS BLD VENIPUNCTURE: CPT

## 2018-01-20 RX ADMIN — ASPIRIN 81 MG CHEWABLE TABLET 81 MG: 81 TABLET CHEWABLE at 08:01

## 2018-01-20 RX ADMIN — LEVETIRACETAM 1000 MG: 100 SOLUTION ORAL at 08:01

## 2018-01-20 RX ADMIN — BETHANECHOL CHLORIDE 10 MG: 10 TABLET ORAL at 06:01

## 2018-01-20 RX ADMIN — LEVOTHYROXINE SODIUM 75 MCG: 75 TABLET ORAL at 08:01

## 2018-01-20 RX ADMIN — MUPIROCIN: 20 OINTMENT TOPICAL at 08:01

## 2018-01-20 RX ADMIN — ATORVASTATIN CALCIUM 40 MG: 20 TABLET, FILM COATED ORAL at 08:01

## 2018-01-20 RX ADMIN — VITAMIN C 1 TABLET: TAB at 08:01

## 2018-01-20 RX ADMIN — STANDARDIZED SENNA CONCENTRATE AND DOCUSATE SODIUM 1 TABLET: 8.6; 5 TABLET, FILM COATED ORAL at 08:01

## 2018-01-20 RX ADMIN — POLYETHYLENE GLYCOL 3350 17 G: 17 POWDER, FOR SOLUTION ORAL at 08:01

## 2018-01-20 RX ADMIN — FINASTERIDE 5 MG: 5 TABLET, FILM COATED ORAL at 08:01

## 2018-01-20 RX ADMIN — BETHANECHOL CHLORIDE 10 MG: 10 TABLET ORAL at 02:01

## 2018-01-20 RX ADMIN — TAMSULOSIN HYDROCHLORIDE 0.4 MG: 0.4 CAPSULE ORAL at 08:01

## 2018-01-20 RX ADMIN — DONEPEZIL HYDROCHLORIDE 5 MG: 5 TABLET, FILM COATED ORAL at 08:01

## 2018-01-20 RX ADMIN — ENOXAPARIN SODIUM 40 MG: 100 INJECTION SUBCUTANEOUS at 05:01

## 2018-01-20 RX ADMIN — BETHANECHOL CHLORIDE 10 MG: 10 TABLET ORAL at 08:01

## 2018-01-20 RX ADMIN — FAMOTIDINE 20 MG: 20 TABLET, FILM COATED ORAL at 08:01

## 2018-01-20 RX ADMIN — MINERAL OIL AND WHITE PETROLATUM: 150; 830 OINTMENT OPHTHALMIC at 08:01

## 2018-01-20 NOTE — PROGRESS NOTES
[facility] Hospital Medicine  Progress Note    Patient Name: Enrique Yancey  MRN: [id number]  Patient Class: IP- Inpatient   Admission Date: 12/17/2017  Length of Stay: 34 days  Attending Physician: Bryson Villavicencio MD  Primary Care Provider: Ang Hassan MD    Hospital Medicine Team: [facility] HOSP MED 1 Phu Regan MD    Subjective:     Principal Problem:Seizure    HPI:  Enrique Yancey is a 67 y.o. 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 with new onset seizure from [facility] on 12/17/2017.  He presented to outside ED with seizure activity for approximately 20 minutes. Per EMS, he was diaphoretic on arrival and tachycardic. Per nursing home, pt is nonverbal at baseline, but can focus on the speaker. He has a trach in place. He is being admitted to [facility] for a higher level of care.     Hospital Course:  12/17: Admit to [facility], EEG pending, Keppra 1 G  at OSH and 500 Q 12 started, CTH: No acute process   12/18: Increase keppra per epilepsy, sputum culture, trend procal  12/19: Treated for ESBL UTI and HAP (respiratory cultures growing Providencia/Proteus) with Zosyn for 7 days. Patient remained clinically stable during his hospital course and discharge to [facility] deferred secondary to high settings on trach collar  12/30: Stepped-down to hospital medicine 1  01/03: Awaiting placement.   01/04: No acute issues over night. Awaiting placement.   1/05: NAEON, TFs running at 40cc/hr increase to 45cc./hr, pt remains severely debilitated still pending re-evaluation by facilities that can provide for him w/ PEG & Trach, will initiate GOC discussion w/ daughter while he is inpatient, plan for discussion Sunday (1/7) morning    01/06: No acute events over night.     01/07: No acute issue over night. Goals of cared discussion with daughter tomorrow  "01/08/18. Still awaiting placement that will allow pt with trach and PEG tubes.     01/08 No acute events. Daughter stopped by for goals of discussion. Would like to make patient Full code, at least until he sees his granddaughter yet to be born. Has rejections from a couple of nursing homes per SW. Stays afebrile, HD stable.     01/08: Urine cx on 01/03 with pseudomonas. Repeat ucx to r/o ESBL, f/u results. Deferred not to treat pseudomonas for now, will repeat urine culture and if persistently positive, will consider treating. Most likely colonization. Still awaiting placement.     1/10: Patient with new onset fever overnight up to 101.9°F with associated hypotension and tachycardia.  Good response to fluid bolus. previous urine cultures show pseudomonas sensitive to Cipro ciprofloxacin started.  There is also evidence of tube feeds surrounding trach site concerning for aspiration, chest x-ray with some opacification of right middle lobe, patient started on 7 day course of Unasyn. BCx, UCx pending    01/11: Patient HD stable. Repeat UCx, no growth. Will remove contact precaution as pt has two negative cx for ESBL. Previous UCx with pseudomonas, on Cipro. On Unasyn for possible Aspiration PNA. He is otherwise stable. Plan to wean oxygen to 3L: Nursing home will not accept if oxygen requirement is above 3L.     01/12: No acute events over night. Satting in the high 90's with out oxygen requirement. Remains stable. Awaiting placement.     01/13: Patient remains HD stable. Still not requiring oxygen and sats well above the 90's. Awaiting placement.     1/14: ERVIN pt on last day of cipro for UTI, day 5/7 of unasyn, 97% O2 on RA, pending placement     01/15: No acute events over nigh. Completed 5 day course of Cipro for UTI. On Unasyn for aspiration PNA. Remains afebrile, sating well in the 90's on RA. Per nurse pt is not on oxygen "just flow".     01/16: No acute events over nights. Still awaiting placement. Remains " HD stable.     01/17: 7/7 for Unasyn today. Remains HD stable. D/C'd Dantrolene and Zanaflex concerning for drowsiness/unecessary meds. Awaiting placement.     01/18: HD stable. More alert today. Awaiting placement.     1/19: ERVIN, daughter would like to attempt Cap trial of trach to see if can be weaned off. Pt is not on any oxygen but rather on room air flow currently. Discussed with resp therapy. Will attempt Cap trial.     01/20: PHILIPEON. Sating well in the high 90's with cap on trach. Na 128, repeat lab 133, likely lab error. Will continue to monitor. Serum osmolarity wnl. Awaiting placement.     Interval History: NAEON.     Review of Systems   Unable to perform ROS: Acuity of condition     Objective:     Vital Signs (Most Recent):  Temp: 99.5 °F (37.5 °C) (01/20/18 1126)  Pulse: 100 (01/20/18 1126)  Resp: 16 (01/20/18 1126)  BP: 114/71 (01/20/18 1126)  SpO2: 95 % (01/20/18 1126) Vital Signs (24h Range):  Temp:  [96.4 °F (35.8 °C)-99.5 °F (37.5 °C)] 99.5 °F (37.5 °C)  Pulse:  [] 100  Resp:  [16-18] 16  SpO2:  [93 %-97 %] 95 %  BP: (111-153)/(60-81) 114/71     Weight: 54.2 kg (119 lb 7.8 oz)  Body mass index is 19.88 kg/m².    Intake/Output Summary (Last 24 hours) at 01/20/18 1138  Last data filed at 01/20/18 0852   Gross per 24 hour   Intake              540 ml   Output                0 ml   Net              540 ml      Physical Exam   Constitutional: He appears cachectic.   More alert today   HENT:   Head: Normocephalic and atraumatic.   Eyes: EOM are normal. Pupils are equal, round, and reactive to light.   Neck: Neck supple.   Cardiovascular: Normal rate and regular rhythm.    Pulmonary/Chest: Effort normal. No respiratory distress.   Trach noted    Abdominal: Soft.   PEG placed   Musculoskeletal:   B/l UE & LE contracted   Neurological:   Somnolent but does open eye to voice and stimuli.  Speech: non-verbal.  Does not follow commands.       Skin: Skin is warm and dry.   Psychiatric: Cognition and  memory are impaired.       Significant Labs:   CBC:   Recent Labs  Lab 01/19/18  0704   WBC 7.57   HGB 9.7*   HCT 28.9*        CMP:   Recent Labs  Lab 01/19/18  0704 01/20/18  0907   * 133*   K 3.9 4.0    107   CO2 21* 18*   GLU 88 115*   BUN 29* 28*   CREATININE 1.1 1.1   CALCIUM 8.7 8.7   ANIONGAP 6* 8   EGFRNONAA >60.0 >60.0       Significant Imaging: I have reviewed all pertinent imaging results/findings within the past 24 hours.    Assessment/Plan:      * Seizure    - Patient without seizure activity since admission.  - Likely instigated by dehydration and infections (ESBL UTI and HAP - patient has completed therapy).   -Completed 5 day course of Cipro for UTI, possible precipitant. Repeat urine cx negative   - Continue levetiracetam 1 g by PEG BID.      Pharyngoesophageal dysphagia    - s/p CVA.  - Was Tolerating TF at goal rate of 45cc/hr, however rate decreased to trickle feeds on January 10 secondary to concern for aspiration with associated fever  -Treating with Unasyn for suspected aspiration PNA given fever and HD instability on 01/10/17, has been afebrile since, HD stable since, has completed 7 day course of treatment      Debility      Pt remains severely debilitated still pending re-evaluation by facilities that can provide for him w/ PEG & Trach, GOC discussed with daughter who stated, would like patient full code for now.    - daughter would like Cap trial attempted  - resp communication ordered  -Sating well above 90's with cap on trach. Perhaps remove trach if sating well. Will continue to monitor.       Aphasia    - s/p CVA  -Expressive aphasia, unclear if pt has receptive aphasia, doesn't follow commands, opens eyes to voice.       CKD (chronic kidney disease) stage 3, GFR 30-59 ml/min    - Avoid nephrotic agents  - Renal function at baseline  - continue to monitor       Benign prostatic hyperplasia with lower urinary tract symptoms    - Continue finasteride 5 mg per G tuve  daily and tamsulosin 0.4 mg per G tube TID.   -completed 5 day course of cipro  for pseudomonas UTI, previously treated for ESBL E.col      Hypothyroidism    - Continue levothyroxine 75 mcg per G tube daily.      Cerebrovascular accident (CVA) due to thrombosis of left carotid artery    - Prior left MCA CVA in 2016 with residual right spastic hemiparesis, purportedly s/p CEA.   - See below.       Right spastic hemiparesis    - From previous L MCA CVA.        VTE Risk Mitigation         Ordered     enoxaparin injection 40 mg  Every 24 hours (non-standard times)     Route:  Subcutaneous        12/17/17 1131     Medium Risk of VTE  Once      12/17/17 0449     Place sequential compression device  Until discontinued      12/17/17 0449          Phu Regan MD  Department of Hospital Medicine   [facility]

## 2018-01-20 NOTE — PLAN OF CARE
Problem: Patient Care Overview  Goal: Plan of Care Review  Outcome: Ongoing (interventions implemented as appropriate)  Pt free from falls.  Pt bed low and locked position. Pt afebrile. Pt IV site without redness or edema.Pt trach capped and is on Room air.

## 2018-01-20 NOTE — ASSESSMENT & PLAN NOTE
Pt remains severely debilitated still pending re-evaluation by facilities that can provide for him w/ PEG & Trach, GOC discussed with daughter who stated, would like patient full code for now.    - daughter would like Cap trial attempted  - resp communication ordered  -Sating well above 90's with cap on trach.

## 2018-01-20 NOTE — PLAN OF CARE
Problem: Patient Care Overview  Goal: Plan of Care Review  Outcome: Ongoing (interventions implemented as appropriate)  Patient is alert and non verbal. Respirations regular and unlabored on room air. Tube feedings continued with no residual noted. No signs of distress noted. Repositions for comfort. Complete bed bath done with all the linens changed. Safety precautions maintained.

## 2018-01-20 NOTE — SUBJECTIVE & OBJECTIVE
Interval History: NAEON.     Review of Systems   Unable to perform ROS: Acuity of condition     Objective:     Vital Signs (Most Recent):  Temp: 99.5 °F (37.5 °C) (01/20/18 1126)  Pulse: 100 (01/20/18 1126)  Resp: 16 (01/20/18 1126)  BP: 114/71 (01/20/18 1126)  SpO2: 95 % (01/20/18 1126) Vital Signs (24h Range):  Temp:  [96.4 °F (35.8 °C)-99.5 °F (37.5 °C)] 99.5 °F (37.5 °C)  Pulse:  [] 100  Resp:  [16-18] 16  SpO2:  [93 %-97 %] 95 %  BP: (111-153)/(60-81) 114/71     Weight: 54.2 kg (119 lb 7.8 oz)  Body mass index is 19.88 kg/m².    Intake/Output Summary (Last 24 hours) at 01/20/18 1138  Last data filed at 01/20/18 0852   Gross per 24 hour   Intake              540 ml   Output                0 ml   Net              540 ml      Physical Exam   Constitutional: He appears cachectic.   More alert today   HENT:   Head: Normocephalic and atraumatic.   Eyes: EOM are normal. Pupils are equal, round, and reactive to light.   Neck: Neck supple.   Cardiovascular: Normal rate and regular rhythm.    Pulmonary/Chest: Effort normal. No respiratory distress.   Trach noted    Abdominal: Soft.   PEG placed   Musculoskeletal:   B/l UE & LE contracted   Neurological:   Somnolent but does open eye to voice and stimuli.  Speech: non-verbal.  Does not follow commands.       Skin: Skin is warm and dry.   Psychiatric: Cognition and memory are impaired.       Significant Labs:   CBC:   Recent Labs  Lab 01/19/18  0704   WBC 7.57   HGB 9.7*   HCT 28.9*        CMP:   Recent Labs  Lab 01/19/18  0704 01/20/18  0907   * 133*   K 3.9 4.0    107   CO2 21* 18*   GLU 88 115*   BUN 29* 28*   CREATININE 1.1 1.1   CALCIUM 8.7 8.7   ANIONGAP 6* 8   EGFRNONAA >60.0 >60.0       Significant Imaging: I have reviewed all pertinent imaging results/findings within the past 24 hours.

## 2018-01-20 NOTE — PROGRESS NOTES
IM1 informed of the order for urine specimen for sodium and osmolality. Pt is incontinent, if they want to order straight cath. MD said just hold on to it, they will be rounding and maybe they don't need the specimen.

## 2018-01-21 LAB
ANION GAP SERPL CALC-SCNC: 9 MMOL/L
BASOPHILS # BLD AUTO: 0.03 K/UL
BASOPHILS NFR BLD: 0.5 %
BUN SERPL-MCNC: 28 MG/DL
CALCIUM SERPL-MCNC: 8.7 MG/DL
CHLORIDE SERPL-SCNC: 107 MMOL/L
CO2 SERPL-SCNC: 20 MMOL/L
CREAT SERPL-MCNC: 1.2 MG/DL
DIFFERENTIAL METHOD: ABNORMAL
EOSINOPHIL # BLD AUTO: 0.3 K/UL
EOSINOPHIL NFR BLD: 5.1 %
ERYTHROCYTE [DISTWIDTH] IN BLOOD BY AUTOMATED COUNT: 15.8 %
EST. GFR  (AFRICAN AMERICAN): >60 ML/MIN/1.73 M^2
EST. GFR  (NON AFRICAN AMERICAN): >60 ML/MIN/1.73 M^2
GLUCOSE SERPL-MCNC: 87 MG/DL
HCT VFR BLD AUTO: 29.9 %
HGB BLD-MCNC: 9.8 G/DL
IMM GRANULOCYTES # BLD AUTO: 0.02 K/UL
IMM GRANULOCYTES NFR BLD AUTO: 0.3 %
LYMPHOCYTES # BLD AUTO: 0.9 K/UL
LYMPHOCYTES NFR BLD: 14.5 %
MAGNESIUM SERPL-MCNC: 2.2 MG/DL
MCH RBC QN AUTO: 30.8 PG
MCHC RBC AUTO-ENTMCNC: 32.8 G/DL
MCV RBC AUTO: 94 FL
MONOCYTES # BLD AUTO: 0.9 K/UL
MONOCYTES NFR BLD: 14.2 %
NEUTROPHILS # BLD AUTO: 4 K/UL
NEUTROPHILS NFR BLD: 65.4 %
NRBC BLD-RTO: 0 /100 WBC
PHOSPHATE SERPL-MCNC: 3.7 MG/DL
PLATELET # BLD AUTO: 224 K/UL
PMV BLD AUTO: 11.8 FL
POCT GLUCOSE: 124 MG/DL (ref 70–110)
POCT GLUCOSE: 128 MG/DL (ref 70–110)
POTASSIUM SERPL-SCNC: 3.9 MMOL/L
RBC # BLD AUTO: 3.18 M/UL
SODIUM SERPL-SCNC: 136 MMOL/L
WBC # BLD AUTO: 6.12 K/UL

## 2018-01-21 PROCEDURE — 25000003 PHARM REV CODE 250: Performed by: PHYSICIAN ASSISTANT

## 2018-01-21 PROCEDURE — 84100 ASSAY OF PHOSPHORUS: CPT

## 2018-01-21 PROCEDURE — 99900026 HC AIRWAY MAINTENANCE (STAT)

## 2018-01-21 PROCEDURE — 36415 COLL VENOUS BLD VENIPUNCTURE: CPT

## 2018-01-21 PROCEDURE — 85025 COMPLETE CBC W/AUTO DIFF WBC: CPT

## 2018-01-21 PROCEDURE — 99232 SBSQ HOSP IP/OBS MODERATE 35: CPT | Mod: GC,,, | Performed by: HOSPITALIST

## 2018-01-21 PROCEDURE — 11000001 HC ACUTE MED/SURG PRIVATE ROOM

## 2018-01-21 PROCEDURE — 63600175 PHARM REV CODE 636 W HCPCS: Performed by: PHYSICIAN ASSISTANT

## 2018-01-21 PROCEDURE — 25000003 PHARM REV CODE 250: Performed by: PSYCHIATRY & NEUROLOGY

## 2018-01-21 PROCEDURE — 25000003 PHARM REV CODE 250: Performed by: HOSPITALIST

## 2018-01-21 PROCEDURE — 80048 BASIC METABOLIC PNL TOTAL CA: CPT

## 2018-01-21 PROCEDURE — 83735 ASSAY OF MAGNESIUM: CPT

## 2018-01-21 RX ADMIN — FINASTERIDE 5 MG: 5 TABLET, FILM COATED ORAL at 10:01

## 2018-01-21 RX ADMIN — TAMSULOSIN HYDROCHLORIDE 0.4 MG: 0.4 CAPSULE ORAL at 10:01

## 2018-01-21 RX ADMIN — LEVETIRACETAM 1000 MG: 100 SOLUTION ORAL at 08:01

## 2018-01-21 RX ADMIN — MINERAL OIL AND WHITE PETROLATUM: 150; 830 OINTMENT OPHTHALMIC at 08:01

## 2018-01-21 RX ADMIN — ASPIRIN 81 MG CHEWABLE TABLET 81 MG: 81 TABLET CHEWABLE at 10:01

## 2018-01-21 RX ADMIN — ENOXAPARIN SODIUM 40 MG: 100 INJECTION SUBCUTANEOUS at 04:01

## 2018-01-21 RX ADMIN — BETHANECHOL CHLORIDE 10 MG: 10 TABLET ORAL at 08:01

## 2018-01-21 RX ADMIN — MUPIROCIN: 20 OINTMENT TOPICAL at 10:01

## 2018-01-21 RX ADMIN — MUPIROCIN: 20 OINTMENT TOPICAL at 08:01

## 2018-01-21 RX ADMIN — ATORVASTATIN CALCIUM 40 MG: 20 TABLET, FILM COATED ORAL at 10:01

## 2018-01-21 RX ADMIN — TAMSULOSIN HYDROCHLORIDE 0.4 MG: 0.4 CAPSULE ORAL at 08:01

## 2018-01-21 RX ADMIN — BETHANECHOL CHLORIDE 10 MG: 10 TABLET ORAL at 04:01

## 2018-01-21 RX ADMIN — DONEPEZIL HYDROCHLORIDE 5 MG: 5 TABLET, FILM COATED ORAL at 10:01

## 2018-01-21 RX ADMIN — LEVOTHYROXINE SODIUM 75 MCG: 75 TABLET ORAL at 10:01

## 2018-01-21 RX ADMIN — VITAMIN C 1 TABLET: TAB at 10:01

## 2018-01-21 RX ADMIN — BETHANECHOL CHLORIDE 10 MG: 10 TABLET ORAL at 05:01

## 2018-01-21 RX ADMIN — LEVETIRACETAM 1000 MG: 100 SOLUTION ORAL at 10:01

## 2018-01-21 RX ADMIN — FAMOTIDINE 20 MG: 20 TABLET, FILM COATED ORAL at 10:01

## 2018-01-21 NOTE — PROGRESS NOTES
Patient's trach capped all night last night according to PM RT. This RT found trach capped this morning as well. Patient in no apparent distress. Will continue to monitor.

## 2018-01-21 NOTE — PROGRESS NOTES
[facility] Hospital Medicine  Progress Note    Patient Name: Enrique Yancey  MRN: [id number]  Patient Class: IP- Inpatient   Admission Date: 12/17/2017  Length of Stay: 35 days  Attending Physician: Bryson Villavicencio MD  Primary Care Provider: Ang Hassan MD    Hospital Medicine Team: [facility] MED 1 Phu Regan MD    Subjective:     Principal Problem:Seizure    HPI:  Enrique Yancey is a 67 y.o. male with with a medical history significant for drug-induced dermatomyositis, essential hypertension, left ICA stenosis with subsequent MCA CVA with residual right spastic hemiparesis who presented to [facility] with new onset seizure from [facility] on 12/17/2017.  He presented to outside ED with seizure activity for approximately 20 minutes. Per EMS, he was diaphoretic on arrival and tachycardic. Per nursing home, pt is nonverbal at baseline, but can focus on the speaker. He has a trach in place. He is being admitted to [facility] for a higher level of care.     Hospital Course:  12/17: Admit to [facility], EEG pending, Keppra 1 G  at OSH and 500 Q 12 started, CTH: No acute process   12/18: Increase keppra per epilepsy, sputum culture, trend procal  12/19: Treated for ESBL UTI and HAP (respiratory cultures growing Providencia/Proteus) with Zosyn for 7 days. Patient remained clinically stable during his hospital course and discharge to [facility] deferred secondary to high settings on trach collar  12/30: Stepped-down to hospital medicine 1  01/03: Awaiting placement.   01/04: No acute issues over night. Awaiting placement.   1/05: NAEON, TFs running at 40cc/hr increase to 45cc./hr, pt remains severely debilitated still pending re-evaluation by facilities that can provide for him w/ PEG & Trach, will initiate GOC discussion w/ daughter while he is inpatient, plan for discussion Sunday (1/7) morning    01/06: No acute events over night.     01/07: No acute issue over night. Goals of cared discussion with daughter tomorrow  "01/08/18. Still awaiting placement that will allow pt with trach and PEG tubes.     01/08 No acute events. Daughter stopped by for goals of discussion. Would like to make patient Full code, at least until he sees his granddaughter yet to be born. Has rejections from a couple of nursing homes per SW. Stays afebrile, HD stable.     01/08: Urine cx on 01/03 with pseudomonas. Repeat ucx to r/o ESBL, f/u results. Deferred not to treat pseudomonas for now, will repeat urine culture and if persistently positive, will consider treating. Most likely colonization. Still awaiting placement.     1/10: Patient with new onset fever overnight up to 101.9°F with associated hypotension and tachycardia.  Good response to fluid bolus. previous urine cultures show pseudomonas sensitive to Cipro ciprofloxacin started.  There is also evidence of tube feeds surrounding trach site concerning for aspiration, chest x-ray with some opacification of right middle lobe, patient started on 7 day course of Unasyn. BCx, UCx pending    01/11: Patient HD stable. Repeat UCx, no growth. Will remove contact precaution as pt has two negative cx for ESBL. Previous UCx with pseudomonas, on Cipro. On Unasyn for possible Aspiration PNA. He is otherwise stable. Plan to wean oxygen to 3L: Nursing home will not accept if oxygen requirement is above 3L.     01/12: No acute events over night. Satting in the high 90's with out oxygen requirement. Remains stable. Awaiting placement.     01/13: Patient remains HD stable. Still not requiring oxygen and sats well above the 90's. Awaiting placement.     1/14: ERVIN pt on last day of cipro for UTI, day 5/7 of unasyn, 97% O2 on RA, pending placement     01/15: No acute events over nigh. Completed 5 day course of Cipro for UTI. On Unasyn for aspiration PNA. Remains afebrile, sating well in the 90's on RA. Per nurse pt is not on oxygen "just flow".     01/16: No acute events over nights. Still awaiting placement. Remains " HD stable.     01/17: 7/7 for Unasyn today. Remains HD stable. D/C'd Dantrolene and Zanaflex concerning for drowsiness/unecessary meds. Awaiting placement.     01/18: HD stable. More alert today. Awaiting placement.     1/19: PHILIPKIET, daughter would like to attempt Cap trial of trach to see if can be weaned off. Pt is not on any oxygen but rather on room air flow currently. Discussed with resp therapy. Will attempt Cap trial.     01/20: NAEON. Sating well in the high 90's with cap on trach. Na 128, repeat lab 133, likely lab error. Will continue to monitor. Serum osmolarity wnl. Awaiting placement.     01/21: NAEON. Patient did not void this morning per patient. Plan to bladder scan today. Na 136 today. Awaiting placement.     Interval History: NAEON.     Review of Systems   Unable to perform ROS: Acuity of condition     Objective:     Vital Signs (Most Recent):  Temp: 98.4 °F (36.9 °C) (01/21/18 1119)  Pulse: 91 (01/21/18 1119)  Resp: 16 (01/21/18 1119)  BP: 123/83 (01/21/18 1119)  SpO2: (!) 91 % (01/21/18 1119) Vital Signs (24h Range):  Temp:  [97.8 °F (36.6 °C)-99.5 °F (37.5 °C)] 98.4 °F (36.9 °C)  Pulse:  [81-97] 91  Resp:  [16-18] 16  SpO2:  [90 %-97 %] 91 %  BP: (117-135)/(67-85) 123/83     Weight: 54.2 kg (119 lb 7.8 oz)  Body mass index is 19.88 kg/m².    Intake/Output Summary (Last 24 hours) at 01/21/18 1419  Last data filed at 01/20/18 2300   Gross per 24 hour   Intake              400 ml   Output                0 ml   Net              400 ml      Physical Exam   Constitutional: He appears cachectic.   More alert today   HENT:   Head: Normocephalic and atraumatic.   Eyes: EOM are normal. Pupils are equal, round, and reactive to light.   Neck: Neck supple.   Cardiovascular: Normal rate and regular rhythm.    Pulmonary/Chest: Effort normal. No respiratory distress.   Trach noted    Abdominal: Soft.   PEG placed   Musculoskeletal:   B/l UE & LE contracted   Neurological:   Somnolent but does open eye to voice  and stimuli.  Speech: non-verbal.  Does not follow commands.       Skin: Skin is warm and dry.   Psychiatric: Cognition and memory are impaired.       Significant Labs:   CBC:   Recent Labs  Lab 01/21/18  0426   WBC 6.12   HGB 9.8*   HCT 29.9*        CMP:   Recent Labs  Lab 01/20/18  0907 01/21/18  0426   * 136   K 4.0 3.9    107   CO2 18* 20*   * 87   BUN 28* 28*   CREATININE 1.1 1.2   CALCIUM 8.7 8.7   ANIONGAP 8 9   EGFRNONAA >60.0 >60.0       Significant Imaging: I have reviewed all pertinent imaging results/findings within the past 24 hours.    Assessment/Plan:      * Seizure    - Patient without seizure activity since admission.  - Likely instigated by dehydration and infections (ESBL UTI and HAP - patient has completed therapy).   -Completed 5 day course of Cipro for UTI, possible precipitant. Repeat urine cx negative   - Continue levetiracetam 1 g by PEG BID.      Pharyngoesophageal dysphagia    - s/p CVA.  - Was Tolerating TF at goal rate of 45cc/hr, however rate decreased to trickle feeds on January 10 secondary to concern for aspiration with associated fever  -Treating with Unasyn for suspected aspiration PNA given fever and HD instability on 01/10/17, has been afebrile since, HD stable since, has completed 7 day course of treatment      Debility      Pt remains severely debilitated still pending re-evaluation by facilities that can provide for him w/ PEG & Trach, GOC discussed with daughter who stated, would like patient full code for now.    - daughter would like Cap trial attempted  - resp communication ordered  -Sating well above 90's with cap on trach.       Aphasia    - s/p CVA  -Expressive aphasia, unclear if pt has receptive aphasia, doesn't follow commands, opens eyes to voice.       CKD (chronic kidney disease) stage 3, GFR 30-59 ml/min    - Avoid nephrotic agents  - Renal function at baseline  - continue to monitor       Normocytic anemia    - Hemoglobin stable at ~10,  without active bleeding.  - Likely component of anemia of chronic disease.       Benign prostatic hyperplasia with lower urinary tract symptoms    - Continue finasteride 5 mg per G tuve daily and tamsulosin 0.4 mg per G tube TID.   -completed 5 day course of cipro  for pseudomonas UTI, previously treated for ESBL E.col      Hypothyroidism    - Continue levothyroxine 75 mcg per G tube daily.      Cerebrovascular accident (CVA) due to thrombosis of left carotid artery    - Prior left MCA CVA in 2016 with residual right spastic hemiparesis, purportedly s/p CEA.   - See below.        VTE Risk Mitigation         Ordered     enoxaparin injection 40 mg  Every 24 hours (non-standard times)     Route:  Subcutaneous        12/17/17 1131     Medium Risk of VTE  Once      12/17/17 0449     Place sequential compression device  Until discontinued      12/17/17 0449          Phu Regan MD  Department of Hospital Medicine   [facility]

## 2018-01-21 NOTE — PLAN OF CARE
Problem: Patient Care Overview  Goal: Plan of Care Review  Outcome: Ongoing (interventions implemented as appropriate)  Uneventful night. Safety and aspiration precautions maintained. HOB in high fowlers. Tube feeding continues at 45 ml/hr. Trach in place and capped off. Repositioned for pressure relief.  No s/s of distress. Pt stable.

## 2018-01-21 NOTE — SUBJECTIVE & OBJECTIVE
Interval History: NAEON.     Review of Systems   Unable to perform ROS: Acuity of condition     Objective:     Vital Signs (Most Recent):  Temp: 98.4 °F (36.9 °C) (01/21/18 1119)  Pulse: 91 (01/21/18 1119)  Resp: 16 (01/21/18 1119)  BP: 123/83 (01/21/18 1119)  SpO2: (!) 91 % (01/21/18 1119) Vital Signs (24h Range):  Temp:  [97.8 °F (36.6 °C)-99.5 °F (37.5 °C)] 98.4 °F (36.9 °C)  Pulse:  [81-97] 91  Resp:  [16-18] 16  SpO2:  [90 %-97 %] 91 %  BP: (117-135)/(67-85) 123/83     Weight: 54.2 kg (119 lb 7.8 oz)  Body mass index is 19.88 kg/m².    Intake/Output Summary (Last 24 hours) at 01/21/18 1419  Last data filed at 01/20/18 2300   Gross per 24 hour   Intake              400 ml   Output                0 ml   Net              400 ml      Physical Exam   Constitutional: He appears cachectic.   More alert today   HENT:   Head: Normocephalic and atraumatic.   Eyes: EOM are normal. Pupils are equal, round, and reactive to light.   Neck: Neck supple.   Cardiovascular: Normal rate and regular rhythm.    Pulmonary/Chest: Effort normal. No respiratory distress.   Trach noted    Abdominal: Soft.   PEG placed   Musculoskeletal:   B/l UE & LE contracted   Neurological:   Somnolent but does open eye to voice and stimuli.  Speech: non-verbal.  Does not follow commands.       Skin: Skin is warm and dry.   Psychiatric: Cognition and memory are impaired.       Significant Labs:   CBC:   Recent Labs  Lab 01/21/18  0426   WBC 6.12   HGB 9.8*   HCT 29.9*        CMP:   Recent Labs  Lab 01/20/18  0907 01/21/18  0426   * 136   K 4.0 3.9    107   CO2 18* 20*   * 87   BUN 28* 28*   CREATININE 1.1 1.2   CALCIUM 8.7 8.7   ANIONGAP 8 9   EGFRNONAA >60.0 >60.0       Significant Imaging: I have reviewed all pertinent imaging results/findings within the past 24 hours.

## 2018-01-22 LAB
POCT GLUCOSE: 121 MG/DL (ref 70–110)
POCT GLUCOSE: 99 MG/DL (ref 70–110)

## 2018-01-22 PROCEDURE — 11000001 HC ACUTE MED/SURG PRIVATE ROOM

## 2018-01-22 PROCEDURE — 99232 SBSQ HOSP IP/OBS MODERATE 35: CPT | Mod: GC,,, | Performed by: HOSPITALIST

## 2018-01-22 PROCEDURE — 25000003 PHARM REV CODE 250: Performed by: HOSPITALIST

## 2018-01-22 PROCEDURE — 99900026 HC AIRWAY MAINTENANCE (STAT)

## 2018-01-22 PROCEDURE — 25000003 PHARM REV CODE 250: Performed by: PSYCHIATRY & NEUROLOGY

## 2018-01-22 PROCEDURE — 31720 CLEARANCE OF AIRWAYS: CPT

## 2018-01-22 PROCEDURE — 94761 N-INVAS EAR/PLS OXIMETRY MLT: CPT

## 2018-01-22 PROCEDURE — 25000003 PHARM REV CODE 250: Performed by: PHYSICIAN ASSISTANT

## 2018-01-22 PROCEDURE — 63600175 PHARM REV CODE 636 W HCPCS: Performed by: PHYSICIAN ASSISTANT

## 2018-01-22 RX ADMIN — TAMSULOSIN HYDROCHLORIDE 0.4 MG: 0.4 CAPSULE ORAL at 09:01

## 2018-01-22 RX ADMIN — DONEPEZIL HYDROCHLORIDE 5 MG: 5 TABLET, FILM COATED ORAL at 10:01

## 2018-01-22 RX ADMIN — BETHANECHOL CHLORIDE 10 MG: 10 TABLET ORAL at 02:01

## 2018-01-22 RX ADMIN — LEVETIRACETAM 1000 MG: 100 SOLUTION ORAL at 09:01

## 2018-01-22 RX ADMIN — FAMOTIDINE 20 MG: 20 TABLET, FILM COATED ORAL at 10:01

## 2018-01-22 RX ADMIN — ACETAMINOPHEN 650 MG: 650 SOLUTION ORAL at 10:01

## 2018-01-22 RX ADMIN — LEVETIRACETAM 1000 MG: 100 SOLUTION ORAL at 10:01

## 2018-01-22 RX ADMIN — ASPIRIN 81 MG CHEWABLE TABLET 81 MG: 81 TABLET CHEWABLE at 10:01

## 2018-01-22 RX ADMIN — TAMSULOSIN HYDROCHLORIDE 0.4 MG: 0.4 CAPSULE ORAL at 10:01

## 2018-01-22 RX ADMIN — VITAMIN C 1 TABLET: TAB at 10:01

## 2018-01-22 RX ADMIN — FINASTERIDE 5 MG: 5 TABLET, FILM COATED ORAL at 10:01

## 2018-01-22 RX ADMIN — MUPIROCIN: 20 OINTMENT TOPICAL at 10:01

## 2018-01-22 RX ADMIN — BETHANECHOL CHLORIDE 10 MG: 10 TABLET ORAL at 05:01

## 2018-01-22 RX ADMIN — ATORVASTATIN CALCIUM 40 MG: 20 TABLET, FILM COATED ORAL at 10:01

## 2018-01-22 RX ADMIN — MINERAL OIL AND WHITE PETROLATUM: 150; 830 OINTMENT OPHTHALMIC at 09:01

## 2018-01-22 RX ADMIN — MUPIROCIN: 20 OINTMENT TOPICAL at 09:01

## 2018-01-22 RX ADMIN — LEVOTHYROXINE SODIUM 75 MCG: 75 TABLET ORAL at 10:01

## 2018-01-22 RX ADMIN — ENOXAPARIN SODIUM 40 MG: 100 INJECTION SUBCUTANEOUS at 06:01

## 2018-01-22 RX ADMIN — BETHANECHOL CHLORIDE 10 MG: 10 TABLET ORAL at 09:01

## 2018-01-22 NOTE — PROGRESS NOTES
[facility] Hospital Medicine  Progress Note    Patient Name: Enrique Yancey  MRN: [id number]  Patient Class: IP- Inpatient   Admission Date: 12/17/2017  Length of Stay: 36 days  Attending Physician: Bryson Villavicencio MD  Primary Care Provider: Ang Hassan MD    Hospital Medicine Team: [facility] HOSP MED 1 Phu Regan MD    Subjective:     Principal Problem:Seizure    HPI:  Enrique Yancey is a 67 y.o. 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  He presented to outside ED with seizure activity for approximately 20 minutes. Per EMS, he was diaphoretic on arrival and tachycardic. Per nursing home, pt is nonverbal at baseline, but can focus on the speaker. He has a trach in place. He is being admitted to [facility] for a higher level of care.     Hospital Course:  12/17: Admit to [facility], EEG pending, Keppra 1 G  at OSH and 500 Q 12 started, CTH: No acute process   12/18: Increase keppra per epilepsy, sputum culture, trend procal  12/19: Treated for ESBL UTI and HAP (respiratory cultures growing Providencia/Proteus) with Zosyn for 7 days. Patient remained clinically stable during his hospital course and discharge to [facility] deferred secondary to high settings on trach collar  12/30: Stepped-down to hospital medicine 1  01/03: Awaiting placement.   01/04: No acute issues over night. Awaiting placement.   1/05: NAEON, TFs running at 40cc/hr increase to 45cc./hr, pt remains severely debilitated still pending re-evaluation by facilities that can provide for him w/ PEG & Trach, will initiate GOC discussion w/ daughter while he is inpatient, plan for discussion Sunday (1/7) morning    01/06: No acute events over night.     01/07: No acute issue over night. Goals of cared discussion with daughter tomorrow  "01/08/18. Still awaiting placement that will allow pt with trach and PEG tubes.     01/08 No acute events. Daughter stopped by for goals of discussion. Would like to make patient Full code, at least until he sees his granddaughter yet to be born. Has rejections from a couple of nursing homes per SW. Stays afebrile, HD stable.     01/08: Urine cx on 01/03 with pseudomonas. Repeat ucx to r/o ESBL, f/u results. Deferred not to treat pseudomonas for now, will repeat urine culture and if persistently positive, will consider treating. Most likely colonization. Still awaiting placement.     1/10: Patient with new onset fever overnight up to 101.9°F with associated hypotension and tachycardia.  Good response to fluid bolus. previous urine cultures show pseudomonas sensitive to Cipro ciprofloxacin started.  There is also evidence of tube feeds surrounding trach site concerning for aspiration, chest x-ray with some opacification of right middle lobe, patient started on 7 day course of Unasyn. BCx, UCx pending    01/11: Patient HD stable. Repeat UCx, no growth. Will remove contact precaution as pt has two negative cx for ESBL. Previous UCx with pseudomonas, on Cipro. On Unasyn for possible Aspiration PNA. He is otherwise stable. Plan to wean oxygen to 3L: Nursing home will not accept if oxygen requirement is above 3L.     01/12: No acute events over night. Satting in the high 90's with out oxygen requirement. Remains stable. Awaiting placement.     01/13: Patient remains HD stable. Still not requiring oxygen and sats well above the 90's. Awaiting placement.     1/14: ERVIN pt on last day of cipro for UTI, day 5/7 of unasyn, 97% O2 on RA, pending placement     01/15: No acute events over nigh. Completed 5 day course of Cipro for UTI. On Unasyn for aspiration PNA. Remains afebrile, sating well in the 90's on RA. Per nurse pt is not on oxygen "just flow".     01/16: No acute events over nights. Still awaiting placement. Remains " HD stable.     01/17: 7/7 for Unasyn today. Remains HD stable. D/C'd Dantrolene and Zanaflex concerning for drowsiness/unecessary meds. Awaiting placement.     01/18: HD stable. More alert today. Awaiting placement.     1/19: PHILIPNOON, daughter would like to attempt Cap trial of trach to see if can be weaned off. Pt is not on any oxygen but rather on room air flow currently. Discussed with resp therapy. Will attempt Cap trial.     01/20: NAEON. Sating well in the high 90's with cap on trach. Na 128, repeat lab 133, likely lab error. Will continue to monitor. Serum osmolarity wnl. Awaiting placement.     01/21: NAEON. Patient did not void this morning per patient. Plan to bladder scan today. Na 136 today. Awaiting placement.     01/22: NAEON. Patient voiding with out trouble. Increased secretions from trach, uncapped, sating well in the 90's. Awaiting placement.     Interval History: NAEON.     Review of Systems   Unable to perform ROS: Acuity of condition     Objective:     Vital Signs (Most Recent):  Temp: 99.1 °F (37.3 °C) (01/22/18 1205)  Pulse: 89 (01/22/18 1205)  Resp: 16 (01/22/18 1205)  BP: 112/61 (01/22/18 1205)  SpO2: (!) 93 % (01/22/18 1205) Vital Signs (24h Range):  Temp:  [98 °F (36.7 °C)-99.9 °F (37.7 °C)] 99.1 °F (37.3 °C)  Pulse:  [83-95] 89  Resp:  [16-18] 16  SpO2:  [87 %-95 %] 93 %  BP: (112-168)/(58-89) 112/61     Weight: 54.2 kg (119 lb 7.8 oz)  Body mass index is 19.88 kg/m².  No intake or output data in the 24 hours ending 01/22/18 1331   Physical Exam   Constitutional: He appears cachectic.   HENT:   Head: Normocephalic and atraumatic.   Eyes: EOM are normal. Pupils are equal, round, and reactive to light.   Neck: Neck supple.   Cardiovascular: Normal rate and regular rhythm.    Pulmonary/Chest: Effort normal. No respiratory distress.   Trach noted    Abdominal: Soft.   PEG placed   Musculoskeletal:   B/l UE & LE contracted   Neurological:   Somnolent but does open eye to voice and  stimuli.  Speech: non-verbal.  Does not follow commands.       Skin: Skin is warm and dry.   Psychiatric: Cognition and memory are impaired.       Significant Labs:   CBC:   Recent Labs  Lab 01/21/18 0426   WBC 6.12   HGB 9.8*   HCT 29.9*        CMP:   Recent Labs  Lab 01/21/18 0426      K 3.9      CO2 20*   GLU 87   BUN 28*   CREATININE 1.2   CALCIUM 8.7   ANIONGAP 9   EGFRNONAA >60.0       Significant Imaging: I have reviewed all pertinent imaging results/findings within the past 24 hours.    Assessment/Plan:      * Seizure    - Patient without seizure activity since admission.  - Likely instigated by dehydration and infections (ESBL UTI and HAP - patient has completed therapy).   -Completed 5 day course of Cipro for UTI, possible precipitant. Repeat urine cx negative   - Continue levetiracetam 1 g by PEG BID.      Pharyngoesophageal dysphagia    - s/p CVA.  - Was Tolerating TF at goal rate of 45cc/hr, however rate decreased to trickle feeds on January 10 secondary to concern for aspiration with associated fever  -Treating with Unasyn for suspected aspiration PNA given fever and HD instability on 01/10/17, has been afebrile since, HD stable since, has completed 7 day course of treatment      Debility      Pt remains severely debilitated still pending re-evaluation by facilities that can provide for him w/ PEG & Trach, GOC discussed with daughter who stated, would like patient full code for now.    - daughter would like Cap trial attempted  - resp communication ordered  -Sating well above 90's with cap on trach.       Aphasia    - s/p CVA  -Expressive aphasia, unclear if pt has receptive aphasia, doesn't follow commands, opens eyes to voice.       CKD (chronic kidney disease) stage 3, GFR 30-59 ml/min    - Avoid nephrotic agents  - Renal function at baseline  - continue to monitor       Normocytic anemia    - Hemoglobin stable at ~10, without active bleeding.  - Likely component of anemia of  chronic disease.       Benign prostatic hyperplasia with lower urinary tract symptoms    - Continue finasteride 5 mg per G tuve daily and tamsulosin 0.4 mg per G tube TID.   -completed 5 day course of cipro  for pseudomonas UTI, previously treated for ESBL E.col      Hypothyroidism    - Continue levothyroxine 75 mcg per G tube daily.      Cerebrovascular accident (CVA) due to thrombosis of left carotid artery    - Prior left MCA CVA in 2016 with residual right spastic hemiparesis, purportedly s/p CEA.   - See below.       Right spastic hemiparesis    - From previous L MCA CVA.     VTE Risk Mitigation         Ordered     enoxaparin injection 40 mg  Every 24 hours (non-standard times)     Route:  Subcutaneous        12/17/17 1131     Medium Risk of VTE  Once      12/17/17 0449     Place sequential compression device  Until discontinued      12/17/17 0449          Phu Regan MD  Department of Hospital Medicine   [facility]

## 2018-01-22 NOTE — PLAN OF CARE
Skye from [facility] called to f/u on pt. SW explained that pt is assigned a new sw. JOSE LUIS provided Skey with new sw name and number.    Alexandra Banegas, [facility] [phone]

## 2018-01-22 NOTE — PLAN OF CARE
JOSE LUIS called [facility] to inquire about referral sent but call was automatically routed to voicemail.  JOSE LUIS left detailed message requesting a return call.  JOSE LUIS then called [facility] regarding the referral sent on 1/8.  JOSE LUIS was told by  that the admissions team was in a meeting and directed the call to .  Another detailed message was left and SW will continue to F/U.  240pm--SW tried Nonya at [facility] again and was told by  that she was not in today.  JOSE LUIS asked if anyone was taking her place and was told that no one was.   stated that Nonya would be back on 1/23/18 at 5am.  JOSE LUIS will F/U with Nonya.  JOSE LUIS also called [facility] and was again directed to voicemail.  JOSE LUIS will continue to call until an answer is given.    Fanny Ac LMSW

## 2018-01-22 NOTE — PLAN OF CARE
Problem: Patient Care Overview  Goal: Plan of Care Review  Outcome: Ongoing (interventions implemented as appropriate)  No acute changes overnight. Safety and aspiration precautions maintained. HOB in high fowlers. Tube feeding continues at 45 ml/hr. Trach in place and capped off. Repositioned for pressure relief.  No s/s of distress. Vital signs stable. Pt voided on pads during night.

## 2018-01-22 NOTE — SUBJECTIVE & OBJECTIVE
Interval History: NAEON.     Review of Systems   Unable to perform ROS: Acuity of condition     Objective:     Vital Signs (Most Recent):  Temp: 99.1 °F (37.3 °C) (01/22/18 1205)  Pulse: 89 (01/22/18 1205)  Resp: 16 (01/22/18 1205)  BP: 112/61 (01/22/18 1205)  SpO2: (!) 93 % (01/22/18 1205) Vital Signs (24h Range):  Temp:  [98 °F (36.7 °C)-99.9 °F (37.7 °C)] 99.1 °F (37.3 °C)  Pulse:  [83-95] 89  Resp:  [16-18] 16  SpO2:  [87 %-95 %] 93 %  BP: (112-168)/(58-89) 112/61     Weight: 54.2 kg (119 lb 7.8 oz)  Body mass index is 19.88 kg/m².  No intake or output data in the 24 hours ending 01/22/18 1331   Physical Exam   Constitutional: He appears cachectic.   HENT:   Head: Normocephalic and atraumatic.   Eyes: EOM are normal. Pupils are equal, round, and reactive to light.   Neck: Neck supple.   Cardiovascular: Normal rate and regular rhythm.    Pulmonary/Chest: Effort normal. No respiratory distress.   Trach noted    Abdominal: Soft.   PEG placed   Musculoskeletal:   B/l UE & LE contracted   Neurological:   Somnolent but does open eye to voice and stimuli.  Speech: non-verbal.  Does not follow commands.       Skin: Skin is warm and dry.   Psychiatric: Cognition and memory are impaired.       Significant Labs:   CBC:   Recent Labs  Lab 01/21/18  0426   WBC 6.12   HGB 9.8*   HCT 29.9*        CMP:   Recent Labs  Lab 01/21/18  0426      K 3.9      CO2 20*   GLU 87   BUN 28*   CREATININE 1.2   CALCIUM 8.7   ANIONGAP 9   EGFRNONAA >60.0       Significant Imaging: I have reviewed all pertinent imaging results/findings within the past 24 hours.

## 2018-01-23 LAB
ANION GAP SERPL CALC-SCNC: 6 MMOL/L
BASOPHILS # BLD AUTO: 0.02 K/UL
BASOPHILS NFR BLD: 0.3 %
BUN SERPL-MCNC: 34 MG/DL
CALCIUM SERPL-MCNC: 8.9 MG/DL
CHLORIDE SERPL-SCNC: 111 MMOL/L
CO2 SERPL-SCNC: 22 MMOL/L
CREAT SERPL-MCNC: 1.2 MG/DL
DIFFERENTIAL METHOD: ABNORMAL
EOSINOPHIL # BLD AUTO: 0.3 K/UL
EOSINOPHIL NFR BLD: 4.6 %
ERYTHROCYTE [DISTWIDTH] IN BLOOD BY AUTOMATED COUNT: 15.9 %
EST. GFR  (AFRICAN AMERICAN): >60 ML/MIN/1.73 M^2
EST. GFR  (NON AFRICAN AMERICAN): >60 ML/MIN/1.73 M^2
GLUCOSE SERPL-MCNC: 91 MG/DL
HCT VFR BLD AUTO: 32.6 %
HGB BLD-MCNC: 10.4 G/DL
IMM GRANULOCYTES # BLD AUTO: 0.02 K/UL
IMM GRANULOCYTES NFR BLD AUTO: 0.3 %
LYMPHOCYTES # BLD AUTO: 1 K/UL
LYMPHOCYTES NFR BLD: 13.9 %
MAGNESIUM SERPL-MCNC: 2.1 MG/DL
MCH RBC QN AUTO: 31 PG
MCHC RBC AUTO-ENTMCNC: 31.9 G/DL
MCV RBC AUTO: 97 FL
MONOCYTES # BLD AUTO: 1.1 K/UL
MONOCYTES NFR BLD: 14.2 %
NEUTROPHILS # BLD AUTO: 5 K/UL
NEUTROPHILS NFR BLD: 66.7 %
NRBC BLD-RTO: 0 /100 WBC
PHOSPHATE SERPL-MCNC: 3.3 MG/DL
PLATELET # BLD AUTO: 210 K/UL
PMV BLD AUTO: 11.7 FL
POCT GLUCOSE: 107 MG/DL (ref 70–110)
POCT GLUCOSE: 117 MG/DL (ref 70–110)
POCT GLUCOSE: 87 MG/DL (ref 70–110)
POTASSIUM SERPL-SCNC: 4.3 MMOL/L
RBC # BLD AUTO: 3.35 M/UL
SODIUM SERPL-SCNC: 139 MMOL/L
WBC # BLD AUTO: 7.41 K/UL

## 2018-01-23 PROCEDURE — 25000003 PHARM REV CODE 250: Performed by: PHYSICIAN ASSISTANT

## 2018-01-23 PROCEDURE — 25000003 PHARM REV CODE 250: Performed by: HOSPITALIST

## 2018-01-23 PROCEDURE — 84100 ASSAY OF PHOSPHORUS: CPT

## 2018-01-23 PROCEDURE — 63600175 PHARM REV CODE 636 W HCPCS: Performed by: PHYSICIAN ASSISTANT

## 2018-01-23 PROCEDURE — 99900026 HC AIRWAY MAINTENANCE (STAT)

## 2018-01-23 PROCEDURE — 80048 BASIC METABOLIC PNL TOTAL CA: CPT

## 2018-01-23 PROCEDURE — 11000001 HC ACUTE MED/SURG PRIVATE ROOM

## 2018-01-23 PROCEDURE — 85025 COMPLETE CBC W/AUTO DIFF WBC: CPT

## 2018-01-23 PROCEDURE — 83735 ASSAY OF MAGNESIUM: CPT

## 2018-01-23 PROCEDURE — 36415 COLL VENOUS BLD VENIPUNCTURE: CPT

## 2018-01-23 PROCEDURE — 99232 SBSQ HOSP IP/OBS MODERATE 35: CPT | Mod: ,,, | Performed by: HOSPITALIST

## 2018-01-23 PROCEDURE — 94761 N-INVAS EAR/PLS OXIMETRY MLT: CPT

## 2018-01-23 PROCEDURE — 25000003 PHARM REV CODE 250: Performed by: PSYCHIATRY & NEUROLOGY

## 2018-01-23 RX ADMIN — FAMOTIDINE 20 MG: 20 TABLET, FILM COATED ORAL at 09:01

## 2018-01-23 RX ADMIN — BETHANECHOL CHLORIDE 10 MG: 10 TABLET ORAL at 09:01

## 2018-01-23 RX ADMIN — LEVOTHYROXINE SODIUM 75 MCG: 75 TABLET ORAL at 09:01

## 2018-01-23 RX ADMIN — ATORVASTATIN CALCIUM 40 MG: 20 TABLET, FILM COATED ORAL at 09:01

## 2018-01-23 RX ADMIN — BETHANECHOL CHLORIDE 10 MG: 10 TABLET ORAL at 05:01

## 2018-01-23 RX ADMIN — VITAMIN C 1 TABLET: TAB at 09:01

## 2018-01-23 RX ADMIN — ASPIRIN 81 MG CHEWABLE TABLET 81 MG: 81 TABLET CHEWABLE at 09:01

## 2018-01-23 RX ADMIN — DONEPEZIL HYDROCHLORIDE 5 MG: 5 TABLET, FILM COATED ORAL at 09:01

## 2018-01-23 RX ADMIN — TAMSULOSIN HYDROCHLORIDE 0.4 MG: 0.4 CAPSULE ORAL at 09:01

## 2018-01-23 RX ADMIN — BETHANECHOL CHLORIDE 10 MG: 10 TABLET ORAL at 02:01

## 2018-01-23 RX ADMIN — ACETAMINOPHEN 650 MG: 650 SOLUTION ORAL at 10:01

## 2018-01-23 RX ADMIN — MINERAL OIL AND WHITE PETROLATUM: 150; 830 OINTMENT OPHTHALMIC at 09:01

## 2018-01-23 RX ADMIN — FINASTERIDE 5 MG: 5 TABLET, FILM COATED ORAL at 09:01

## 2018-01-23 RX ADMIN — MUPIROCIN: 20 OINTMENT TOPICAL at 09:01

## 2018-01-23 RX ADMIN — POLYETHYLENE GLYCOL 3350 17 G: 17 POWDER, FOR SOLUTION ORAL at 09:01

## 2018-01-23 RX ADMIN — ENOXAPARIN SODIUM 40 MG: 100 INJECTION SUBCUTANEOUS at 05:01

## 2018-01-23 RX ADMIN — LEVETIRACETAM 1000 MG: 100 SOLUTION ORAL at 09:01

## 2018-01-23 RX ADMIN — STANDARDIZED SENNA CONCENTRATE AND DOCUSATE SODIUM 1 TABLET: 8.6; 5 TABLET, FILM COATED ORAL at 09:01

## 2018-01-23 NOTE — PLAN OF CARE
Trach NH reconsideration referrals sent  to [facility], [facility], [facility] and [facility].    These facilities denied patient back in December.  SW asked for reconsideration review as we have not been able to secure placement since.    Daughter Chioma Cooley updated via telephone.  Daughter asked if pt was successful with the capping trial.  SW could not answer but informed daughter team would be asked during medicine huddle.  SW to ask team if patient will ever be able to de-cannulate as this would change our discharge options.  If this is a long process we will continue to pursue trach NH placement.  Daughter voiced understanding.    Will continue to follow.    Liz Pena, RE[facility] [phone]

## 2018-01-23 NOTE — PROGRESS NOTES
[facility] Hospital Medicine  Progress Note    Patient Name: Enrique Yancey  MRN: [id number]  Patient Class: IP- Inpatient   Admission Date: 12/17/2017  Length of Stay: 37 days  Attending Physician: Anna Marie Holguin MD  Primary Care Provider: Ang Hassan MD    Hospital Medicine Team: [facility] HOSP MED 1 Phu Regan MD    Subjective:     Principal Problem:Seizure    HPI:  Enrique Yancey is a 67 y.o. male with with a medical history significant for drug-induced dermatomyositis, essential hypertension, left ICA stenosis with subsequent MCA CVA with residual right spastic hemiparesis who presented to [facility] with new onset seizure from [facility] on 12/17/2017.  He presented to outside ED with seizure activity for approximately 20 minutes. Per EMS, he was diaphoretic on arrival and tachycardic. Per nursing home, pt is nonverbal at baseline, but can focus on the speaker. He has a trach in place. He is being admitted to [facility] for a higher level of care.     Hospital Course:  12/17: Admit to [facility], EEG pending, Keppra 1 G  at OSH and 500 Q 12 started, CTH: No acute process   12/18: Increase keppra per epilepsy, sputum culture, trend procal  12/19: Treated for ESBL UTI and HAP (respiratory cultures growing Providencia/Proteus) with Zosyn for 7 days. Patient remained clinically stable during his hospital course and discharge to [facility] deferred secondary to high settings on trach collar  12/30: Stepped-down to hospital medicine 1  01/03: Awaiting placement.   01/04: No acute issues over night. Awaiting placement.   1/05: NAEON, TFs running at 40cc/hr increase to 45cc./hr, pt remains severely debilitated still pending re-evaluation by facilities that can provide for him w/ PEG & Trach, will initiate GOC discussion w/ daughter while he is inpatient, plan for discussion Sunday (1/7) morning    01/06: No acute events over night.     01/07: No acute issue over night. Goals of cared discussion with daughter tomorrow  "01/08/18. Still awaiting placement that will allow pt with trach and PEG tubes.     01/08 No acute events. Daughter stopped by for goals of discussion. Would like to make patient Full code, at least until he sees his granddaughter yet to be born. Has rejections from a couple of nursing homes per SW. Stays afebrile, HD stable.     01/08: Urine cx on 01/03 with pseudomonas. Repeat ucx to r/o ESBL, f/u results. Deferred not to treat pseudomonas for now, will repeat urine culture and if persistently positive, will consider treating. Most likely colonization. Still awaiting placement.     1/10: Patient with new onset fever overnight up to 101.9°F with associated hypotension and tachycardia.  Good response to fluid bolus. previous urine cultures show pseudomonas sensitive to Cipro ciprofloxacin started.  There is also evidence of tube feeds surrounding trach site concerning for aspiration, chest x-ray with some opacification of right middle lobe, patient started on 7 day course of Unasyn. BCx, UCx pending    01/11: Patient HD stable. Repeat UCx, no growth. Will remove contact precaution as pt has two negative cx for ESBL. Previous UCx with pseudomonas, on Cipro. On Unasyn for possible Aspiration PNA. He is otherwise stable. Plan to wean oxygen to 3L: Nursing home will not accept if oxygen requirement is above 3L.     01/12: No acute events over night. Satting in the high 90's with out oxygen requirement. Remains stable. Awaiting placement.     01/13: Patient remains HD stable. Still not requiring oxygen and sats well above the 90's. Awaiting placement.     1/14: ERVIN pt on last day of cipro for UTI, day 5/7 of unasyn, 97% O2 on RA, pending placement     01/15: No acute events over nigh. Completed 5 day course of Cipro for UTI. On Unasyn for aspiration PNA. Remains afebrile, sating well in the 90's on RA. Per nurse pt is not on oxygen "just flow".     01/16: No acute events over nights. Still awaiting placement. Remains " HD stable.     01/17: 7/7 for Unasyn today. Remains HD stable. D/C'd Dantrolene and Zanaflex concerning for drowsiness/unecessary meds. Awaiting placement.     01/18: HD stable. More alert today. Awaiting placement.     1/19: ERVIN, daughter would like to attempt Cap trial of trach to see if can be weaned off. Pt is not on any oxygen but rather on room air flow currently. Discussed with resp therapy. Will attempt Cap trial.     01/20: NAEON. Sating well in the high 90's with cap on trach. Na 128, repeat lab 133, likely lab error. Will continue to monitor. Serum osmolarity wnl. Awaiting placement.     01/21: NAEON. Patient did not void this morning per patient. Plan to bladder scan today. Na 136 today. Awaiting placement.     01/22: NAEON. Patient voiding with out trouble. Increased secretions from trach, uncapped, sating well in the 90's. Awaiting placement.     01/23: NAEON. Sating well in the 90's. Awaiting placement. Removal of trach can be problematic, though pt is sating well with a cap on, given his debility/bad prognosis may need trach in the long run. Plan to discuss with daughter. Remains HD stable.     Interval History: ERVIN.     Review of Systems   Unable to perform ROS: Acuity of condition     Objective:     Vital Signs (Most Recent):  Temp: 99.5 °F (37.5 °C) (01/23/18 1143)  Pulse: 88 (01/23/18 1143)  Resp: 17 (01/23/18 1143)  BP: 121/69 (01/23/18 1143)  SpO2: (!) 94 % (01/23/18 1235) Vital Signs (24h Range):  Temp:  [98.1 °F (36.7 °C)-100.4 °F (38 °C)] 99.5 °F (37.5 °C)  Pulse:  [77-88] 88  Resp:  [16-18] 17  SpO2:  [94 %-98 %] 94 %  BP: (121-165)/(64-79) 121/69     Weight: 54.2 kg (119 lb 7.8 oz)  Body mass index is 19.88 kg/m².    Intake/Output Summary (Last 24 hours) at 01/23/18 1308  Last data filed at 01/22/18 2124   Gross per 24 hour   Intake              200 ml   Output                0 ml   Net              200 ml      Physical Exam   Constitutional: He appears cachectic.   HENT:   Head:  Normocephalic and atraumatic.   Eyes: EOM are normal. Pupils are equal, round, and reactive to light.   Neck: Neck supple.   Cardiovascular: Normal rate and regular rhythm.    Pulmonary/Chest: Effort normal. No respiratory distress.   Trach noted    Abdominal: Soft.   PEG placed   Musculoskeletal:   B/l UE & LE contracted   Neurological:   Somnolent but does open eye to voice and stimuli.  Speech: non-verbal.  Does not follow commands.       Skin: Skin is warm and dry.   Psychiatric: Cognition and memory are impaired.       Significant Labs:   CBC:   Recent Labs  Lab 01/23/18  0432   WBC 7.41   HGB 10.4*   HCT 32.6*        CMP:   Recent Labs  Lab 01/23/18 0432      K 4.3   *   CO2 22*   GLU 91   BUN 34*   CREATININE 1.2   CALCIUM 8.9   ANIONGAP 6*   EGFRNONAA >60.0       Significant Imaging: I have reviewed all pertinent imaging results/findings within the past 24 hours.    Assessment/Plan:      * Seizure    - Patient without seizure activity since admission.  - Likely instigated by dehydration and infections (ESBL UTI and HAP - patient has completed therapy).   -Completed 5 day course of Cipro for UTI, possible precipitant. Repeat urine cx negative   - Continue levetiracetam 1 g by PEG BID.      Pharyngoesophageal dysphagia    - s/p CVA.  - Was Tolerating TF at goal rate of 45cc/hr, however rate decreased to trickle feeds on January 10 secondary to concern for aspiration with associated fever  -Treating with Unasyn for suspected aspiration PNA given fever and HD instability on 01/10/17, has been afebrile since, HD stable since, has completed 7 day course of treatment  -Patient trach capped sating well, daughter considering potential removal of trach. Plan to discuss potential adverse effect of removing trach given patient's debility/poor prognosis, will likely require trach in the future.       Debility      Pt remains severely debilitated still pending re-evaluation by facilities that can  provide for him w/ PEG & Trach, GOC discussed with daughter who stated, would like patient full code for now.    - daughter would like Cap trial attempted  - resp communication ordered  -Sating well above 90's with cap on trach.   -PT/OT      Aphasia    - s/p CVA  -Expressive aphasia, unclear if pt has receptive aphasia, doesn't follow commands, opens eyes to voice.       CKD (chronic kidney disease) stage 3, GFR 30-59 ml/min    - Avoid nephrotic agents  - Renal function at baseline  - continue to monitor       Normocytic anemia    - Hemoglobin stable at ~10, without active bleeding.  - Likely component of anemia of chronic disease.       Benign prostatic hyperplasia with lower urinary tract symptoms    - Continue finasteride 5 mg per G tuve daily and tamsulosin 0.4 mg per G tube TID.   -completed 5 day course of cipro  for pseudomonas UTI, previously treated for ESBL E.col      Hypothyroidism    - Continue levothyroxine 75 mcg per G tube daily.      Cerebrovascular accident (CVA) due to thrombosis of left carotid artery    - Prior left MCA CVA in 2016 with residual right spastic hemiparesis, purportedly s/p CEA.   - See below.      VTE Risk Mitigation         Ordered     enoxaparin injection 40 mg  Every 24 hours (non-standard times)     Route:  Subcutaneous        12/17/17 1131     Medium Risk of VTE  Once      12/17/17 0449     Place sequential compression device  Until discontinued      12/17/17 0449          Phu Regan MD  Department of Hospital Medicine   [facility]                      01/23/2018                             STAFF PHYSICIAN NOTE                                   Attending Attestation for Rounds with Resident  I have reviewed and concur with the resident's history, physical, assessment, and plan.  I have personally interviewed and examined the patient at bedside and agree with the resident's findings.                                   ________________________________________                                     REASON FOR ADMISSION:     Patient is 67 y.o.male    Body mass index is 19.88 kg/m².,  Seizure

## 2018-01-23 NOTE — SUBJECTIVE & OBJECTIVE
Interval History: NAEON.     Review of Systems   Unable to perform ROS: Acuity of condition     Objective:     Vital Signs (Most Recent):  Temp: 99.5 °F (37.5 °C) (01/23/18 1143)  Pulse: 88 (01/23/18 1143)  Resp: 17 (01/23/18 1143)  BP: 121/69 (01/23/18 1143)  SpO2: (!) 94 % (01/23/18 1235) Vital Signs (24h Range):  Temp:  [98.1 °F (36.7 °C)-100.4 °F (38 °C)] 99.5 °F (37.5 °C)  Pulse:  [77-88] 88  Resp:  [16-18] 17  SpO2:  [94 %-98 %] 94 %  BP: (121-165)/(64-79) 121/69     Weight: 54.2 kg (119 lb 7.8 oz)  Body mass index is 19.88 kg/m².    Intake/Output Summary (Last 24 hours) at 01/23/18 1308  Last data filed at 01/22/18 2124   Gross per 24 hour   Intake              200 ml   Output                0 ml   Net              200 ml      Physical Exam   Constitutional: He appears cachectic.   HENT:   Head: Normocephalic and atraumatic.   Eyes: EOM are normal. Pupils are equal, round, and reactive to light.   Neck: Neck supple.   Cardiovascular: Normal rate and regular rhythm.    Pulmonary/Chest: Effort normal. No respiratory distress.   Trach noted    Abdominal: Soft.   PEG placed   Musculoskeletal:   B/l UE & LE contracted   Neurological:   Somnolent but does open eye to voice and stimuli.  Speech: non-verbal.  Does not follow commands.       Skin: Skin is warm and dry.   Psychiatric: Cognition and memory are impaired.       Significant Labs:   CBC:   Recent Labs  Lab 01/23/18  0432   WBC 7.41   HGB 10.4*   HCT 32.6*        CMP:   Recent Labs  Lab 01/23/18  0432      K 4.3   *   CO2 22*   GLU 91   BUN 34*   CREATININE 1.2   CALCIUM 8.9   ANIONGAP 6*   EGFRNONAA >60.0       Significant Imaging: I have reviewed all pertinent imaging results/findings within the past 24 hours.

## 2018-01-23 NOTE — NURSING
No acute changes overnight. Safety and aspiration precautions maintained. HOB in high fowlers. Tube feeding continues at 45 ml/hr. Trach secure and in place. RT suctioned thick secretions x 2 and placed on humidification to help loosen secretions tolerated well. Repositioned for pressure relief.  No s/s of distress. Vital signs stable.

## 2018-01-23 NOTE — PLAN OF CARE
Referral sent to    [facility] [phone]  [facility] [phone]    Roxanne is f/u.

## 2018-01-23 NOTE — ASSESSMENT & PLAN NOTE
Pt remains severely debilitated still pending re-evaluation by facilities that can provide for him w/ PEG & Trach, GOC discussed with daughter who stated, would like patient full code for now.    - daughter would like Cap trial attempted  - resp communication ordered  -Sating well above 90's with cap on trach.   -PT/OT

## 2018-01-23 NOTE — PLAN OF CARE
01/23/18 1655   Discharge Reassessment   Assessment Type Discharge Planning Reassessment   Provided patient/caregiver education on the expected discharge date and the discharge plan Yes   Do you have any problems affording any of your prescribed medications? No   Discharge Plan A New Nursing Home placement - skilled nursing care facility   Discharge Plan B Inpatient Hospice   Patient choice form signed by patient/caregiver No   Can the patient answer the patient profile reliably? Yes, cognitively intact   Describe the patient's ability to walk at the present time. Does not walk or unable to take any steps at all   How often would a person be available to care for the patient? Often   Number of comorbid conditions (as recorded on the chart) Five or more

## 2018-01-23 NOTE — ASSESSMENT & PLAN NOTE
- s/p CVA.  - Was Tolerating TF at goal rate of 45cc/hr, however rate decreased to trickle feeds on January 10 secondary to concern for aspiration with associated fever  -Treating with Unasyn for suspected aspiration PNA given fever and HD instability on 01/10/17, has been afebrile since, HD stable since, has completed 7 day course of treatment  -Patient trach capped sating well, daughter considering potential removal of trach. Plan to discuss potential adverse effect of removing trach given patient's debility/poor prognosis, will likely require trach in the future.

## 2018-01-24 VITALS
BODY MASS INDEX: 19.91 KG/M2 | WEIGHT: 119.5 LBS | OXYGEN SATURATION: 100 % | RESPIRATION RATE: 20 BRPM | HEART RATE: 74 BPM | HEIGHT: 65 IN | DIASTOLIC BLOOD PRESSURE: 86 MMHG | TEMPERATURE: 99 F | SYSTOLIC BLOOD PRESSURE: 141 MMHG

## 2018-01-24 LAB — POCT GLUCOSE: 88 MG/DL (ref 70–110)

## 2018-01-24 PROCEDURE — 99239 HOSP IP/OBS DSCHRG MGMT >30: CPT | Mod: ,,, | Performed by: HOSPITALIST

## 2018-01-24 PROCEDURE — 25000003 PHARM REV CODE 250: Performed by: PHYSICIAN ASSISTANT

## 2018-01-24 PROCEDURE — 25000003 PHARM REV CODE 250: Performed by: PSYCHIATRY & NEUROLOGY

## 2018-01-24 PROCEDURE — 63600175 PHARM REV CODE 636 W HCPCS: Performed by: PHYSICIAN ASSISTANT

## 2018-01-24 PROCEDURE — 27000221 HC OXYGEN, UP TO 24 HOURS

## 2018-01-24 PROCEDURE — 25000003 PHARM REV CODE 250: Performed by: HOSPITALIST

## 2018-01-24 RX ORDER — LOPERAMIDE HYDROCHLORIDE 2 MG/1
2 CAPSULE ORAL DAILY PRN
Qty: 1 CAPSULE | Refills: 0 | OUTPATIENT
Start: 2018-01-24

## 2018-01-24 RX ORDER — FAMOTIDINE 20 MG/1
20 TABLET, FILM COATED ORAL DAILY
Qty: 30 TABLET | Refills: 11 | Status: SHIPPED | OUTPATIENT
Start: 2018-01-25 | End: 2019-01-25

## 2018-01-24 RX ADMIN — DONEPEZIL HYDROCHLORIDE 5 MG: 5 TABLET, FILM COATED ORAL at 09:01

## 2018-01-24 RX ADMIN — ASPIRIN 81 MG CHEWABLE TABLET 81 MG: 81 TABLET CHEWABLE at 09:01

## 2018-01-24 RX ADMIN — FINASTERIDE 5 MG: 5 TABLET, FILM COATED ORAL at 09:01

## 2018-01-24 RX ADMIN — VITAMIN C 1 TABLET: TAB at 09:01

## 2018-01-24 RX ADMIN — STANDARDIZED SENNA CONCENTRATE AND DOCUSATE SODIUM 1 TABLET: 8.6; 5 TABLET, FILM COATED ORAL at 09:01

## 2018-01-24 RX ADMIN — BETHANECHOL CHLORIDE 10 MG: 10 TABLET ORAL at 01:01

## 2018-01-24 RX ADMIN — FAMOTIDINE 20 MG: 20 TABLET, FILM COATED ORAL at 09:01

## 2018-01-24 RX ADMIN — POLYETHYLENE GLYCOL 3350 17 G: 17 POWDER, FOR SOLUTION ORAL at 09:01

## 2018-01-24 RX ADMIN — BETHANECHOL CHLORIDE 10 MG: 10 TABLET ORAL at 05:01

## 2018-01-24 RX ADMIN — MUPIROCIN: 20 OINTMENT TOPICAL at 09:01

## 2018-01-24 RX ADMIN — TAMSULOSIN HYDROCHLORIDE 0.4 MG: 0.4 CAPSULE ORAL at 09:01

## 2018-01-24 RX ADMIN — LEVETIRACETAM 1000 MG: 100 SOLUTION ORAL at 09:01

## 2018-01-24 RX ADMIN — ENOXAPARIN SODIUM 40 MG: 100 INJECTION SUBCUTANEOUS at 05:01

## 2018-01-24 RX ADMIN — ATORVASTATIN CALCIUM 40 MG: 20 TABLET, FILM COATED ORAL at 09:01

## 2018-01-24 RX ADMIN — LEVOTHYROXINE SODIUM 75 MCG: 75 TABLET ORAL at 09:01

## 2018-01-24 NOTE — PT/OT/SLP PROGRESS
Physical Therapy      Patient Name:  Enrique Yancey   MRN:  [id number]    PT orders acknowledged on today. PT spoke with IM Team 1 Dr. Hess regarding pt not being appropriate for PT services while in hospital. MD agreeable to orders being discontinued. Spoke with OT regarding OT orders being discontinued as well.     Marianna Castro, PT, DPT  1/24/2018

## 2018-01-24 NOTE — DISCHARGE SUMMARY
DISCHARGE SUMMARY  Hospital Medicine    Team: [facility]    Patient Name: Enrique Yancey  YOB: 1950    Admit Date: 12/17/2017    Discharge Date: 01/24/2018    Discharge Attending Physician: Anna Marie Holguin MD     Diagnoses:  Active Hospital Problems    Diagnosis  POA    *Seizure [R56.9]  Yes    Debility [R53.81]  Yes    Normocytic anemia [D64.9]  Yes    Benign prostatic hyperplasia with lower urinary tract symptoms [N40.1]  Yes    Hypothyroidism [E03.9]  Yes    Cerebrovascular accident (CVA) due to thrombosis of left carotid artery [I63.032]  Yes    Pharyngoesophageal dysphagia [R13.14]  Yes    Aphasia [R47.01]  Yes    Right spastic hemiparesis [G81.11]  Yes    CKD (chronic kidney disease) stage 3, GFR 30-59 ml/min [N18.3]  Yes      Resolved Hospital Problems    Diagnosis Date Resolved POA    Hypokalemia [E87.6] 01/17/2018 No    UTI (urinary tract infection) [N39.0] 01/15/2018 Yes    Fever [R50.9] 01/12/2018 No    Acute cystitis without hematuria [N30.00] 12/30/2017 Yes    Pneumonia due to infectious organism [J18.9] 12/30/2017 Yes       Discharged Condition: admit problems have stabilized       Pt was seen, examined, and discussed on medical rounds today (1/24) please see below for further details regarding care plan     HOSPITAL COURSE:      Initial Presentation:    Enrique Yancey is a 67 y.o. male with with a medical history significant for drug-induced dermatomyositis, essential hypertension, left ICA stenosis with subsequent MCA CVA with residual right spastic hemiparesis who presented to [facility] with new onset seizure from [facility] on 12/17/2017.  He presented to outside ED with seizure activity for approximately 20 minutes. Per EMS, he was diaphoretic on arrival and tachycardic. Per nursing home, pt is nonverbal at baseline, but can focus on the speaker. He has a trach in place. He is being admitted to [facility] for a higher level of care.     Course of Principle Problem for  Admission:    12/17: Admit to [facility], EEG pending, Keppra 1 G  at OSH and 500 Q 12 started, CTH: No acute process   12/18: Increase keppra per epilepsy, sputum culture, trend procal  12/19: Treated for ESBL UTI and HAP (respiratory cultures growing Providencia/Proteus) with Zosyn for 7 days. Patient remained clinically stable during his hospital course and discharge to [facility] deferred secondary to high settings on trach collar  12/30: Stepped-down to hospital medicine 1  01/03: Awaiting placement.   01/04: No acute issues over night. Awaiting placement.   1/05: NAEON, TFs running at 40cc/hr increase to 45cc./hr, pt remains severely debilitated still pending re-evaluation by facilities that can provide for him w/ PEG & Trach, will initiate GOC discussion w/ daughter while he is inpatient, plan for discussion Sunday (1/7) morning     01/06: No acute events over night.      01/07: No acute issue over night. Goals of cared discussion with daughter tomorrow 01/08/18. Still awaiting placement that will allow pt with trach and PEG tubes.      01/08 No acute events. Daughter stopped by for goals of discussion. Would like to make patient Full code, at least until he sees his granddaughter yet to be born. Has rejections from a couple of nursing homes per SW. Stays afebrile, HD stable.      01/08: Urine cx on 01/03 with pseudomonas. Repeat ucx to r/o ESBL, f/u results. Deferred not to treat pseudomonas for now, will repeat urine culture and if persistently positive, will consider treating. Most likely colonization. Still awaiting placement.      1/10: Patient with new onset fever overnight up to 101.9°F with associated hypotension and tachycardia.  Good response to fluid bolus. previous urine cultures show pseudomonas sensitive to Cipro ciprofloxacin started.  There is also evidence of tube feeds surrounding trach site concerning for aspiration, chest x-ray with some opacification of right middle lobe, patient started on 7 day  "course of Unasyn. BCx, UCx pending     01/11: Patient HD stable. Repeat UCx, no growth. Will remove contact precaution as pt has two negative cx for ESBL. Previous UCx with pseudomonas, on Cipro. On Unasyn for possible Aspiration PNA. He is otherwise stable. Plan to wean oxygen to 3L: Nursing home will not accept if oxygen requirement is above 3L.      01/12: No acute events over night. Satting in the high 90's with out oxygen requirement. Remains stable. Awaiting placement.      01/13: Patient remains HD stable. Still not requiring oxygen and sats well above the 90's. Awaiting placement.      1/14: ERVIN pt on last day of cipro for UTI, day 5/7 of unasyn, 97% O2 on RA, pending placement      01/15: No acute events over nigh. Completed 5 day course of Cipro for UTI. On Unasyn for aspiration PNA. Remains afebrile, sating well in the 90's on RA. Per nurse pt is not on oxygen "just flow".      01/16: No acute events over nights. Still awaiting placement. Remains HD stable.      01/17: 7/7 for Unasyn today. Remains HD stable. D/C'd Dantrolene and Zanaflex concerning for drowsiness/unecessary meds. Awaiting placement.      01/18: HD stable. More alert today. Awaiting placement.      1/19: PHILIPNOON, daughter would like to attempt Cap trial of trach to see if can be weaned off. Pt is not on any oxygen but rather on room air flow currently. Discussed with resp therapy. Will attempt Cap trial.      01/20: NAEON. Sating well in the high 90's with cap on trach. Na 128, repeat lab 133, likely lab error. Will continue to monitor. Serum osmolarity wnl. Awaiting placement.      01/21: NAEON. Patient did not void this morning per patient. Plan to bladder scan today. Na 136 today. Awaiting placement.      01/22: NAEON. Patient voiding with out trouble. Increased secretions from trach, uncapped, sating well in the 90's. Awaiting placement.      01/23: NAEON. Sating well in the 90's. Awaiting placement. Removal of trach can be " problematic, though pt is sating well with a cap on, given his debility/bad prognosis may need trach in the long run. Plan to discuss with daughter. Remains HD stable.     1/24: PHILIPNOON, Pt accepted for SNF bed at [facility], will proceed w/ D/C      Seizure     - Patient without seizure activity since admission.  - Likely instigated by dehydration and infections (ESBL UTI and HAP - patient has completed therapy).   -Completed 5 day course of Cipro for UTI, possible precipitant. Repeat urine cx negative   - Continue levetiracetam 1 g by PEG BID         Other Medical Problems Addressed in the Hospital:    Pharyngoesophageal dysphagia     - s/p CVA.  - Was Tolerating TF at goal rate of 45cc/hr, however rate decreased to trickle feeds on January 10 secondary to concern for aspiration with associated fever  -Treating with Unasyn for suspected aspiration PNA given fever and HD instability on 01/10/17, has been afebrile since, HD stable since, has completed 7 day course of treatment  -Patient trach capped sating well, daughter considering potential removal of trach. Plan to discuss potential adverse effect of removing trach given patient's debility/poor prognosis, will likely require trach in the future.        Debility        Pt remains severely debilitated still pending re-evaluation by facilities that can provide for him w/ PEG & Trach, GOC discussed with daughter who stated, would like patient full code for now.     - daughter would like Cap trial attempted  - resp communication ordered  -Sating well above 90's with cap on trach.   -PT/OT       Aphasia     - s/p CVA  -Expressive aphasia, unclear if pt has receptive aphasia, doesn't follow commands, opens eyes to voice.        CKD (chronic kidney disease) stage 3, GFR 30-59 ml/min     - Avoid nephrotic agents  - Renal function at baseline  - continue to monitor        Normocytic anemia     - Hemoglobin stable at ~10, without active bleeding.  - Likely component of  anemia of chronic disease.        Benign prostatic hyperplasia with lower urinary tract symptoms     - Continue finasteride 5 mg per G tuve daily and tamsulosin 0.4 mg per G tube TID.   -completed 5 day course of cipro  for pseudomonas UTI, previously treated for ESBL E.col       Hypothyroidism     - Continue levothyroxine 75 mcg per G tube daily.       Cerebrovascular accident (CVA) due to thrombosis of left carotid artery     - Prior left MCA CVA in 2016 with residual right spastic hemiparesis, purportedly s/p CEA.   - See below.          CONSULTS: Nutrition, Wound Care, PM&R, Neuro ICU     Last CBC/BMP/HgbA1c (if applicable):  Recent Results (from the past 336 hour(s))   CBC auto differential    Collection Time: 01/23/18  4:32 AM   Result Value Ref Range    WBC 7.41 3.90 - 12.70 K/uL    Hemoglobin 10.4 (L) 14.0 - 18.0 g/dL    Hematocrit 32.6 (L) 40.0 - 54.0 %    Platelets 210 150 - 350 K/uL   CBC auto differential    Collection Time: 01/21/18  4:26 AM   Result Value Ref Range    WBC 6.12 3.90 - 12.70 K/uL    Hemoglobin 9.8 (L) 14.0 - 18.0 g/dL    Hematocrit 29.9 (L) 40.0 - 54.0 %    Platelets 224 150 - 350 K/uL   CBC auto differential    Collection Time: 01/19/18  7:04 AM   Result Value Ref Range    WBC 7.57 3.90 - 12.70 K/uL    Hemoglobin 9.7 (L) 14.0 - 18.0 g/dL    Hematocrit 28.9 (L) 40.0 - 54.0 %    Platelets 214 150 - 350 K/uL     Recent Results (from the past 336 hour(s))   Basic metabolic panel    Collection Time: 01/23/18  4:32 AM   Result Value Ref Range    Sodium 139 136 - 145 mmol/L    Potassium 4.3 3.5 - 5.1 mmol/L    Chloride 111 (H) 95 - 110 mmol/L    CO2 22 (L) 23 - 29 mmol/L    BUN, Bld 34 (H) 8 - 23 mg/dL    Creatinine 1.2 0.5 - 1.4 mg/dL    Calcium 8.9 8.7 - 10.5 mg/dL    Anion Gap 6 (L) 8 - 16 mmol/L   Basic metabolic panel    Collection Time: 01/21/18  4:26 AM   Result Value Ref Range    Sodium 136 136 - 145 mmol/L    Potassium 3.9 3.5 - 5.1 mmol/L    Chloride 107 95 - 110 mmol/L    CO2 20 (L)  23 - 29 mmol/L    BUN, Bld 28 (H) 8 - 23 mg/dL    Creatinine 1.2 0.5 - 1.4 mg/dL    Calcium 8.7 8.7 - 10.5 mg/dL    Anion Gap 9 8 - 16 mmol/L   Basic metabolic panel    Collection Time: 01/20/18  9:07 AM   Result Value Ref Range    Sodium 133 (L) 136 - 145 mmol/L    Potassium 4.0 3.5 - 5.1 mmol/L    Chloride 107 95 - 110 mmol/L    CO2 18 (L) 23 - 29 mmol/L    BUN, Bld 28 (H) 8 - 23 mg/dL    Creatinine 1.1 0.5 - 1.4 mg/dL    Calcium 8.7 8.7 - 10.5 mg/dL    Anion Gap 8 8 - 16 mmol/L     Lab Results   Component Value Date    HGBA1C 6.0 11/24/2016           Special Treatments/Procedures: EEG    Disposition:   SNF     Future Scheduled Appointments:  No future appointments.        Discharge Medication List:         Medication List      START taking these medications    famotidine 20 MG tablet  Commonly known as:  PEPCID  1 tablet (20 mg total) by Per G Tube route once daily.  Start taking on:  1/25/2018     insulin aspart 100 unit/mL Inpn pen  Commonly known as:  NovoLOG  Inject 0-5 Units into the skin every 4 (four) hours as needed (Hyperglycemia).     levetiracetam oral soln 500 mg/5 mL (5 mL) Soln  10 mLs (1,000 mg total) by Per G Tube route 2 (two) times daily.        CHANGE how you take these medications    ENEMA 19-7 gram/118 mL Enem  Generic drug:  sodium phosphates  What changed:  Another medication with the same name was removed. Continue taking this medication, and follow the directions you see here.     loperamide 2 mg capsule  Commonly known as:  ANTI-DIARRHEAL (LOPERAMIDE)  1 capsule (2 mg total) by Per G Tube route daily as needed for Diarrhea. Take 1 tablet per PEG after each diarrhea to a max of 8 doses/24 hours  What changed:  · when to take this  · reasons to take this        CONTINUE taking these medications    acetaminophen 160 mg/5 mL Elix  Commonly known as:  TYLENOL     B-complex with vitamin C tablet  Commonly known as:  Z-Bec or Equiv     BACTROBAN 2 % ointment  Generic drug:  mupirocin      bethanechol 25 MG Tab  Commonly known as:  URECHOLINE     chlorhexidine 0.12 % solution  Commonly known as:  PERIDEX     donepezil 5 MG tablet  Commonly known as:  ARICEPT     finasteride 5 mg tablet  Commonly known as:  PROSCAR     FLUoxetine 20 MG capsule  Commonly known as:  PROZAC     levothyroxine 75 MCG tablet  Commonly known as:  SYNTHROID     lidocaine 5 % Gel     omeprazole 40 MG capsule  Commonly known as:  PRILOSEC     ONE DAILY MULTIVITAMIN per tablet  Generic drug:  multivitamin     potassium chloride SA 20 MEQ tablet  Commonly known as:  K-DUR,KLOR-CON     VITAMIN B-12 ORAL     VITAMIN C 500 mg/5 mL Syrp syrup  Generic drug:  ascorbic acid (vitamin C)        STOP taking these medications    amLODIPine 10 MG tablet  Commonly known as:  NORVASC     baclofen 20 MG tablet  Commonly known as:  LIORESAL     baclofen 20 MG tablet  Commonly known as:  LIORESAL     labetalol 300 MG tablet  Commonly known as:  NORMODYNE     polyethylene glycol 17 gram Pwpk  Commonly known as:  GLYCOLAX     tamsulosin 0.4 mg Cp24  Commonly known as:  FLOMAX        ASK your doctor about these medications    baclofen 20 MG tablet  Commonly known as:  LIORESAL  1 tablet (20 mg total) by Per G Tube route 3 (three) times daily.  Ask about: Should I take this medication?           Where to Get Your Medications      These medications were sent to [facility] [address] [place] [address] & [address] [address]    Phone:  [phone]   · famotidine 20 MG tablet  · levetiracetam oral soln 500 mg/5 mL (5 mL) Soln     You can get these medications from any pharmacy    Bring a paper prescription for each of these medications  · loperamide 2 mg capsule     Information about where to get these medications is not yet available    Ask your nurse or doctor about these medications  · baclofen 20 MG tablet  · insulin aspart 100 unit/mL Inpn pen         Patient  Instructions:  No discharge procedures on file.    Signing Physician:  Ivonne Hess MD

## 2018-01-24 NOTE — PLAN OF CARE
Pt accepted to [facility] as per Nonya in admissions.  SW spoke with [facility] RN to give instructions to call report to nurse for room 203 at [phone].  Stretcher transport arranged through [facility] ([phone]) for 6pm.  Transport envelope printed and delivered to 9th floor nurses' station.  Nato Squires, notifed.        Fanny Ac LMSW

## 2018-01-24 NOTE — PLAN OF CARE
JOSE LUIS spoke with Ashlyn at [facility] and was told that she will be able to take pt into a SNF bed.  Ashlyn stated that pt's family is on board with transfer to this facility.  JOSE LUIS sent PASRR, 142, DC orders, chest xray and PPD via [facility].  JOSE LUIS will F/U with Ashlyn.      Fanny Ac LMSW

## 2018-01-24 NOTE — PLAN OF CARE
JOSE LUIS called Ashlyn to get report contact information and was told that she had not received the fax via [facility].  JOSE LUIS opened [facility] and told Ashlyn that it was sent at 1147am.  JOSE LUIS was asked to manually fax and received notice via email stating that fax had been received successfully.  JOSE LUIS will continue to F/U.      Fanny Ac LMSW

## 2018-01-24 NOTE — PLAN OF CARE
Problem: Fall Risk (Adult)  Goal: Absence of Falls  Patient will demonstrate the desired outcomes by discharge/transition of care.   Outcome: Ongoing (interventions implemented as appropriate)  Absence of falls noted at present. SR up x'3. Fall risk protocol IP. Bed alarm on at all times. Door ajar at all times. Turn [id number] IP. Will cont to monitor.

## 2018-01-24 NOTE — PLAN OF CARE
[facility]     Department of Hospital Medicine     [address] [place] [address]     [phone] after hours  [phone] fax       NURSING HOME ORDERS    01/24/2018    Admit to Nursing Home:      Skilled Bed      Diagnoses:  Active Hospital Problems    Diagnosis  POA    *Seizure [R56.9]  Yes    Debility [R53.81]  Yes    Normocytic anemia [D64.9]  Yes    Benign prostatic hyperplasia with lower urinary tract symptoms [N40.1]  Yes    Hypothyroidism [E03.9]  Yes    Cerebrovascular accident (CVA) due to thrombosis of left carotid artery [I63.032]  Yes    Pharyngoesophageal dysphagia [R13.14]  Yes    Aphasia [R47.01]  Yes    Right spastic hemiparesis [G81.11]  Yes    CKD (chronic kidney disease) stage 3, GFR 30-59 ml/min [N18.3]  Yes      Resolved Hospital Problems    Diagnosis Date Resolved POA    Hypokalemia [E87.6] 01/17/2018 No    UTI (urinary tract infection) [N39.0] 01/15/2018 Yes    Fever [R50.9] 01/12/2018 No    Acute cystitis without hematuria [N30.00] 12/30/2017 Yes    Pneumonia due to infectious organism [J18.9] 12/30/2017 Yes       Patient is homebound due to:  Seizure    Allergies:  Review of patient's allergies indicates:   Allergen Reactions    Diltiazem hcl     Metoprolol tartrate     Sulfa (sulfonamide antibiotics)        Vitals:       Every shift (Skilled Nursing patients)    Diet:   Tube Feeding:      - Continuous isosource 1.5 marvin or available equivalent at 45 mL per hour    - Flush tube with 225 mL water every 6 hours    Acitivities:      - Up in a chair each morning as tolerated      LABS:  Per facility protocol    Nursing Precautions:     - Aspiration precautions:             - Total assistance with meals            -  Upright 90 degrees befor during and after meals             -  Suction at bedside          - Fall precautions per nursing home protocol   - Seizure precaution per [facility] protocol   - Decubitus  precautions:        -  for positioning   - Pressure reducing foam mattress   - Turn patient every two hours. Use wedge pillows to anchor patient    CONSULTS:      Physical Therapy to evaluate and treat     Occupational Therapy to evaluate and treat     Speech Therapy  to evaluate and treat     Nutrition to evaluate and recommend diet    MISCELLANEOUS CARE:       PEG Care:  Clean site every 24 hours       Routine Skin for Bedridden Patients:  Apply moisture barrier cream to all    skin folds and wet areas in perineal area daily and after baths and                           all bowel movements.      Trach Care:      Portex Airway Device: size 7.0                                          Clean site every 24 hours        May connect to 3-5L airflow as needed for ventilatory support as needed        Check SpO2 periodically and may connect to O2 1-5L PRN for oxygen desaturation                        DIABETES CARE:        Check blood sugar:        Fingerstick blood sugar every 6 hours       Report CBG < 60 or > 400 to physician.                                          Insulin Sliding Scale          Glucose  Novolog Insulin Subcutaneous        0 - 60   Orange juice or glucose tablet, hold insulin      No insulin   201-250  2 units   251-300  4 units   301-350  6 units   351-400  8 units   >400   10 units then call physician      Medications:    Enrique Yancey   Home Medication Instructions VIKA:[id number]    Printed on:01/24/18 1136   Medication Information                      acetaminophen (TYLENOL) 160 mg/5 mL Elix  20.3 mLs by Per G Tube route every 4 (four) hours as needed.             ascorbic acid, vitamin C, (VITAMIN C) 500 mg/5 mL Syrp syrup  500 mg by Per G Tube route 2 (two) times daily.              B-complex with vitamin C (Z-BEC OR EQUIV) tablet  1 tablet by Per G Tube route once daily.             bethanechol (URECHOLINE) 25 MG Tab  25 mg by Per G Tube route every 8 (eight) hours.               chlorhexidine (PERIDEX) 0.12 % solution  Use as directed 15 mLs in the mouth or throat 2 (two) times daily.             CYANOCOBALAMIN, VITAMIN B-12, (VITAMIN B-12 ORAL)  2,500 mcg by Per G Tube route once daily.              donepezil (ARICEPT) 5 MG tablet  5 mg by Per G Tube route once daily.             famotidine (PEPCID) 20 MG tablet  1 tablet (20 mg total) by Per G Tube route once daily.             finasteride (PROSCAR) 5 mg tablet  5 mg by Per G Tube route once daily.             FLUoxetine (PROZAC) 20 MG capsule  20 mg by Per G Tube route once daily.             insulin aspart (NOVOLOG) 100 unit/mL InPn pen  Inject 0-5 Units into the skin every 4 (four) hours as needed (Hyperglycemia).             levetiracetam oral soln 500 mg/5 mL (5 mL) Soln  10 mLs (1,000 mg total) by Per G Tube route 2 (two) times daily.             levothyroxine (SYNTHROID) 75 MCG tablet  75 mcg by Per G Tube route once daily.             lidocaine 5 % Gel  Apply topically. For trach change             loperamide (ANTI-DIARRHEAL, LOPERAMIDE,) 2 mg capsule  1 capsule (2 mg total) by Per G Tube route daily as needed for Diarrhea. Take 1 tablet per PEG after each diarrhea to a max of 8 doses/24 hours             multivitamin (ONE DAILY MULTIVITAMIN) per tablet  1 tablet by Per G Tube route once daily.             mupirocin (BACTROBAN) 2 % ointment  Apply topically 2 (two) times daily. Apply to penis two times a day until healed.             omeprazole (PRILOSEC) 40 MG capsule  Take 1 capsule by g tube once daily             potassium chloride SA (K-DUR,KLOR-CON) 20 MEQ tablet  Take 1 tablet per g tube twice daily             sodium phosphates (ENEMA) 19-7 gram/118 mL Enem  Place 1 enema rectally every Mon, Wed, Fri.                       _________________________________  Ivonne Hess MD  01/24/2018

## 2018-01-25 ENCOUNTER — PATIENT OUTREACH (OUTPATIENT)
Dept: ADMINISTRATIVE | Facility: CLINIC | Age: 68
End: 2018-01-25

## 2019-05-17 NOTE — PLAN OF CARE
Problem: Infection, Risk/Actual (Adult)  Goal: Identify Related Risk Factors and Signs and Symptoms  Related risk factors and signs and symptoms are identified upon initiation of Human Response Clinical Practice Guideline (CPG)   Outcome: Ongoing (interventions implemented as appropriate)   01/15/18 7600   Infection, Risk/Actual   Related Risk Factors (Infection, Risk/Actual) malnutrition;medication effects;skin integrity impairment   Signs and Symptoms (Infection, Risk/Actual) body temperature changes;skin cool/clammy          no

## 2020-04-13 NOTE — ASSESSMENT & PLAN NOTE
-b/l UE & LE contractures noted  -per MAR, patient is currently on Baclofen 20mg per PEG  -Botox injections only performed in outpatient clinic  -PT/OT eval and treat   -will f/u with PM&R physician for further recs                 Other Specify

## 2022-10-31 NOTE — ASSESSMENT & PLAN NOTE
- Continue finasteride 5 mg per G tuve daily and tamsulosin 0.4 mg per G tube TID.    Patient Specific Counseling (Will Not Stick From Patient To Patient): - Discussed and recommended liquid nitrogen cryosurgery. Detail Level: Zone Detail Level: Detailed

## 2023-08-31 NOTE — SUBJECTIVE & OBJECTIVE
Interval History:  No significant events over night transfer to [facility] today    Review of Systems   Unable to obtain a complete ROS due to level of consciousness.  Objective:     Vitals:  Temp: 98 °F (36.7 °C) (12/27/17 1100)  Pulse: 83 (12/27/17 1200)  Resp: 19 (12/27/17 1200)  BP: 138/84 (12/27/17 1200)  SpO2: 99 % (12/27/17 1200)    Temp:  [97.8 °F (36.6 °C)-99.2 °F (37.3 °C)] 98 °F (36.7 °C)  Pulse:  [] 83  Resp:  [14-24] 19  SpO2:  [95 %-100 %] 99 %  BP: (115-153)/(75-94) 138/84         Oxygen Concentration (%):  [40] 40    12/26 0701 - 12/27 0700  In: 1920 [I.V.:120]  Out: 1485 [Urine:1485]    Physical Exam       Physical Exam:  GA: chronic illness  no acute distress.   HEENT: No scleral icterus or JVD.   Pulmonary: Clear to auscultation A/P/L.   Cardiac: RRR S1 & S2 w/o rubs/murmurs/gallops.   Abdominal: Bowel sounds present x 4.   Skin: No jaundice, rashes, or visible lesions.  Neuro:  --GCS: E4 V1 M1 baseline   --Mental Status:  Opens eyes spontaneously does not follow commands   --Corneal reflex, gag, cough intact.  --contracted x 4 ext   Unable to test orientation, language, memory, coordination, gait due to level of consciousness.    Medications:  Continuous Scheduled  aspirin 81 mg Daily   atorvastatin 40 mg Daily   B-complex with vitamin C 1 tablet Daily   baclofen 5 mg TID   bethanechol 10 mg Q8H   dantrolene 25 mg Q8H   donepezil 5 mg Daily   enoxparin 40 mg Q24H   famotidine 20 mg BID   finasteride 5 mg Daily   FLUoxetine 20 mg Daily   levetiracetam oral soln 1,000 mg BID   levothyroxine 75 mcg Daily   mupirocin  BID   polyethylene glycol 17 g Daily   senna-docusate 8.6-50 mg 1 tablet Daily   tamsulosin 0.4 mg BID   white petrolatum-mineral oil  QHS   PRN  acetaminophen 650 mg Q4H PRN   dextrose 50% 12.5 g PRN   glucagon (human recombinant) 1 mg PRN   insulin aspart 0-5 Units Q4H PRN   magnesium oxide 800 mg PRN   magnesium oxide 800 mg PRN   ondansetron 4 mg Q8H PRN   potassium chloride 10% 40  Return to clinic as needed.    mEq PRN   potassium chloride 10% 40 mEq PRN   potassium chloride 10% 60 mEq PRN   potassium, sodium phosphates 2 packet PRN   potassium, sodium phosphates 2 packet PRN   potassium, sodium phosphates 2 packet PRN   sodium chloride 0.9% 3 mL PRN     Today I personally reviewed pertinent medications, lines/drains/airways, imaging, lab results,
